# Patient Record
Sex: FEMALE | Race: WHITE | NOT HISPANIC OR LATINO | Employment: OTHER | RURAL
[De-identification: names, ages, dates, MRNs, and addresses within clinical notes are randomized per-mention and may not be internally consistent; named-entity substitution may affect disease eponyms.]

---

## 2017-05-10 ENCOUNTER — HISTORICAL (OUTPATIENT)
Dept: ADMINISTRATIVE | Facility: HOSPITAL | Age: 67
End: 2017-05-10

## 2017-05-12 LAB
LAB AP GENERAL CAT - HISTORICAL: NORMAL
LAB AP INTERPRETATION/RESULT - HISTORICAL: NEGATIVE
LAB AP SPECIMEN ADEQUACY - HISTORICAL: NORMAL
LAB AP SPECIMEN SUBMITTED - HISTORICAL: NORMAL

## 2020-07-27 ENCOUNTER — HISTORICAL (OUTPATIENT)
Dept: ADMINISTRATIVE | Facility: HOSPITAL | Age: 70
End: 2020-07-27

## 2020-08-19 ENCOUNTER — HISTORICAL (OUTPATIENT)
Dept: ADMINISTRATIVE | Facility: HOSPITAL | Age: 70
End: 2020-08-19

## 2020-08-30 ENCOUNTER — HISTORICAL (OUTPATIENT)
Dept: ADMINISTRATIVE | Facility: HOSPITAL | Age: 70
End: 2020-08-30

## 2020-10-05 ENCOUNTER — HISTORICAL (OUTPATIENT)
Dept: ADMINISTRATIVE | Facility: HOSPITAL | Age: 70
End: 2020-10-05

## 2020-10-06 LAB
INSULIN SERPL-ACNC: NORMAL U[IU]/ML
LAB AP CLINICAL INFORMATION: NORMAL
LAB AP COMMENTS: NORMAL
LAB AP DIAGNOSIS - HISTORICAL: NORMAL
LAB AP GROSS PATHOLOGY - HISTORICAL: NORMAL
LAB AP SPECIMEN SUBMITTED - HISTORICAL: NORMAL

## 2021-07-29 ENCOUNTER — TELEPHONE (OUTPATIENT)
Dept: FAMILY MEDICINE | Facility: CLINIC | Age: 71
End: 2021-07-29

## 2021-07-29 RX ORDER — VITS A,C,E/LUTEIN/MINERALS 300MCG-200
1 TABLET ORAL DAILY
COMMUNITY
End: 2022-10-05

## 2021-07-29 RX ORDER — PYRIDOXINE HCL (VITAMIN B6) 100 MG
50 TABLET ORAL DAILY
COMMUNITY

## 2021-07-29 RX ORDER — EZETIMIBE 10 MG/1
10 TABLET ORAL DAILY
COMMUNITY
End: 2021-11-08 | Stop reason: SDUPTHER

## 2021-07-29 RX ORDER — CHOLECALCIFEROL (VITAMIN D3) 25 MCG
1000 TABLET ORAL DAILY
COMMUNITY

## 2021-07-29 RX ORDER — MULTIVITAMIN
1 TABLET ORAL DAILY
COMMUNITY

## 2021-07-29 RX ORDER — LOSARTAN POTASSIUM 25 MG/1
25 TABLET ORAL DAILY
COMMUNITY
End: 2021-11-08 | Stop reason: SDUPTHER

## 2021-07-29 RX ORDER — IBUPROFEN 100 MG/5ML
1000 SUSPENSION, ORAL (FINAL DOSE FORM) ORAL DAILY
COMMUNITY
End: 2021-10-05

## 2021-07-29 RX ORDER — ESOMEPRAZOLE MAGNESIUM 20 MG/1
1 TABLET, DELAYED RELEASE ORAL DAILY
COMMUNITY
End: 2021-10-05

## 2021-07-29 RX ORDER — NAPROXEN SODIUM 220 MG/1
81 TABLET, FILM COATED ORAL DAILY
COMMUNITY

## 2021-07-29 RX ORDER — ESZOPICLONE 3 MG/1
3 TABLET, FILM COATED ORAL NIGHTLY
COMMUNITY
End: 2021-12-06

## 2021-07-29 RX ORDER — FERROUS SULFATE 325(65) MG
325 TABLET ORAL
COMMUNITY
End: 2022-10-05 | Stop reason: SDUPTHER

## 2021-07-29 RX ORDER — OMEPRAZOLE 20 MG/1
20 CAPSULE, DELAYED RELEASE ORAL DAILY
COMMUNITY
End: 2021-11-08 | Stop reason: SDUPTHER

## 2021-07-29 RX ORDER — METFORMIN HYDROCHLORIDE 500 MG/1
500 TABLET ORAL 3 TIMES DAILY
COMMUNITY
End: 2021-11-08 | Stop reason: SDUPTHER

## 2021-07-29 RX ORDER — ZINC GLUCONATE 50 MG
50 TABLET ORAL DAILY
COMMUNITY

## 2021-07-29 RX ORDER — ESZOPICLONE 2 MG/1
3 TABLET, FILM COATED ORAL NIGHTLY
COMMUNITY
End: 2021-07-29

## 2021-09-14 DIAGNOSIS — Z01.84 ENCOUNTER FOR ANTIBODY RESPONSE EXAMINATION: Primary | ICD-10-CM

## 2021-10-04 ENCOUNTER — HOSPITAL ENCOUNTER (OUTPATIENT)
Dept: RADIOLOGY | Facility: HOSPITAL | Age: 71
Discharge: HOME | End: 2021-10-04
Payer: MEDICARE

## 2021-10-04 VITALS — HEIGHT: 64 IN | BODY MASS INDEX: 25.44 KG/M2 | WEIGHT: 149 LBS

## 2021-10-04 DIAGNOSIS — Z12.31 VISIT FOR SCREENING MAMMOGRAM: ICD-10-CM

## 2021-10-04 PROCEDURE — 77067 SCR MAMMO BI INCL CAD: CPT | Mod: 26,,, | Performed by: RADIOLOGY

## 2021-10-04 PROCEDURE — 77067 SCR MAMMO BI INCL CAD: CPT | Mod: TC

## 2021-10-04 PROCEDURE — 77067 MAMMO DIGITAL SCREENING BILAT: ICD-10-PCS | Mod: 26,,, | Performed by: RADIOLOGY

## 2021-10-05 ENCOUNTER — OFFICE VISIT (OUTPATIENT)
Dept: FAMILY MEDICINE | Facility: CLINIC | Age: 71
End: 2021-10-05
Payer: MEDICARE

## 2021-10-05 VITALS
WEIGHT: 152.38 LBS | SYSTOLIC BLOOD PRESSURE: 120 MMHG | BODY MASS INDEX: 28.04 KG/M2 | HEIGHT: 62 IN | TEMPERATURE: 98 F | DIASTOLIC BLOOD PRESSURE: 76 MMHG | HEART RATE: 67 BPM | OXYGEN SATURATION: 99 % | RESPIRATION RATE: 20 BRPM

## 2021-10-05 DIAGNOSIS — Z78.0 POST-MENOPAUSAL: ICD-10-CM

## 2021-10-05 DIAGNOSIS — E78.00 HYPERCHOLESTEREMIA: Primary | ICD-10-CM

## 2021-10-05 DIAGNOSIS — G47.00 INSOMNIA, UNSPECIFIED TYPE: ICD-10-CM

## 2021-10-05 DIAGNOSIS — I10 BENIGN ESSENTIAL HTN: ICD-10-CM

## 2021-10-05 DIAGNOSIS — D50.9 IRON DEFICIENCY ANEMIA, UNSPECIFIED IRON DEFICIENCY ANEMIA TYPE: ICD-10-CM

## 2021-10-05 DIAGNOSIS — M85.89 OTHER SPECIFIED DISORDERS OF BONE DENSITY AND STRUCTURE, MULTIPLE SITES: ICD-10-CM

## 2021-10-05 DIAGNOSIS — Z79.899 ENCOUNTER FOR LONG-TERM (CURRENT) USE OF OTHER MEDICATIONS: ICD-10-CM

## 2021-10-05 DIAGNOSIS — Z23 FLU VACCINE NEED: ICD-10-CM

## 2021-10-05 DIAGNOSIS — R73.03 PREDIABETES: ICD-10-CM

## 2021-10-05 PROCEDURE — G0008 FLU VACCINE (QUAD) GREATER THAN OR EQUAL TO 3YO PRESERVATIVE FREE IM: ICD-10-PCS | Mod: ,,, | Performed by: NURSE PRACTITIONER

## 2021-10-05 PROCEDURE — 90686 FLU VACCINE (QUAD) GREATER THAN OR EQUAL TO 3YO PRESERVATIVE FREE IM: ICD-10-PCS | Mod: ,,, | Performed by: NURSE PRACTITIONER

## 2021-10-05 PROCEDURE — 90686 IIV4 VACC NO PRSV 0.5 ML IM: CPT | Mod: ,,, | Performed by: NURSE PRACTITIONER

## 2021-10-05 PROCEDURE — G0008 ADMIN INFLUENZA VIRUS VAC: HCPCS | Mod: ,,, | Performed by: NURSE PRACTITIONER

## 2021-10-05 PROCEDURE — 99214 OFFICE O/P EST MOD 30 MIN: CPT | Mod: ,,, | Performed by: NURSE PRACTITIONER

## 2021-10-05 PROCEDURE — 99214 PR OFFICE/OUTPT VISIT, EST, LEVL IV, 30-39 MIN: ICD-10-PCS | Mod: ,,, | Performed by: NURSE PRACTITIONER

## 2021-10-19 ENCOUNTER — HOSPITAL ENCOUNTER (OUTPATIENT)
Dept: RADIOLOGY | Facility: HOSPITAL | Age: 71
Discharge: HOME | End: 2021-10-19
Attending: NURSE PRACTITIONER
Payer: MEDICARE

## 2021-10-19 DIAGNOSIS — Z78.0 POST-MENOPAUSAL: ICD-10-CM

## 2021-10-19 DIAGNOSIS — M85.89 OTHER SPECIFIED DISORDERS OF BONE DENSITY AND STRUCTURE, MULTIPLE SITES: ICD-10-CM

## 2021-10-19 PROCEDURE — 77080 DXA BONE DENSITY AXIAL: CPT | Mod: 26,,, | Performed by: STUDENT IN AN ORGANIZED HEALTH CARE EDUCATION/TRAINING PROGRAM

## 2021-10-19 PROCEDURE — 77080 DEXA BONE DENSITY SPINE HIP: ICD-10-PCS | Mod: 26,,, | Performed by: STUDENT IN AN ORGANIZED HEALTH CARE EDUCATION/TRAINING PROGRAM

## 2021-10-19 PROCEDURE — 77080 DXA BONE DENSITY AXIAL: CPT | Mod: TC

## 2021-11-02 ENCOUNTER — IMMUNIZATION (OUTPATIENT)
Dept: FAMILY MEDICINE | Facility: CLINIC | Age: 71
End: 2021-11-02
Payer: MEDICARE

## 2021-11-02 DIAGNOSIS — Z23 NEED FOR VACCINATION: Primary | ICD-10-CM

## 2021-11-02 PROCEDURE — 91306 COVID-19, MRNA, LNP-S, PF, 100 MCG/0.25 ML DOSE VACCINE (MODERNA BOOSTER): CPT | Mod: ,,, | Performed by: NURSE PRACTITIONER

## 2021-11-02 PROCEDURE — 0064A COVID-19, MRNA, LNP-S, PF, 100 MCG/0.25 ML DOSE VACCINE (MODERNA BOOSTER): CPT | Mod: ,,, | Performed by: NURSE PRACTITIONER

## 2021-11-02 PROCEDURE — 91306 COVID-19, MRNA, LNP-S, PF, 100 MCG/0.25 ML DOSE VACCINE (MODERNA BOOSTER): ICD-10-PCS | Mod: ,,, | Performed by: NURSE PRACTITIONER

## 2021-11-02 PROCEDURE — 0064A COVID-19, MRNA, LNP-S, PF, 100 MCG/0.25 ML DOSE VACCINE (MODERNA BOOSTER): ICD-10-PCS | Mod: ,,, | Performed by: NURSE PRACTITIONER

## 2021-11-08 RX ORDER — LOSARTAN POTASSIUM 25 MG/1
25 TABLET ORAL DAILY
Qty: 90 TABLET | Refills: 1 | Status: SHIPPED | OUTPATIENT
Start: 2021-11-08 | End: 2021-12-06 | Stop reason: SDUPTHER

## 2021-11-08 RX ORDER — OMEPRAZOLE 20 MG/1
20 CAPSULE, DELAYED RELEASE ORAL DAILY
Qty: 90 CAPSULE | Refills: 1 | Status: SHIPPED | OUTPATIENT
Start: 2021-11-08 | End: 2021-12-06 | Stop reason: SDUPTHER

## 2021-11-08 RX ORDER — EZETIMIBE 10 MG/1
10 TABLET ORAL DAILY
Qty: 90 TABLET | Refills: 1 | Status: SHIPPED | OUTPATIENT
Start: 2021-11-08 | End: 2021-12-06 | Stop reason: SDUPTHER

## 2021-11-08 RX ORDER — METFORMIN HYDROCHLORIDE 500 MG/1
500 TABLET ORAL 3 TIMES DAILY
Qty: 270 TABLET | Refills: 1 | Status: SHIPPED | OUTPATIENT
Start: 2021-11-08 | End: 2021-12-06 | Stop reason: SDUPTHER

## 2021-11-19 DIAGNOSIS — Z98.1 HISTORY OF LUMBAR FUSION: Primary | ICD-10-CM

## 2021-11-22 ENCOUNTER — HOSPITAL ENCOUNTER (OUTPATIENT)
Dept: RADIOLOGY | Facility: HOSPITAL | Age: 71
Discharge: HOME | End: 2021-11-22
Attending: ORTHOPAEDIC SURGERY
Payer: MEDICARE

## 2021-11-22 ENCOUNTER — OFFICE VISIT (OUTPATIENT)
Dept: SPINE | Facility: CLINIC | Age: 71
End: 2021-11-22
Payer: MEDICARE

## 2021-11-22 DIAGNOSIS — M51.36 DDD (DEGENERATIVE DISC DISEASE), LUMBAR: ICD-10-CM

## 2021-11-22 DIAGNOSIS — Z98.1 HISTORY OF LUMBAR FUSION: ICD-10-CM

## 2021-11-22 DIAGNOSIS — Z98.1 HISTORY OF LUMBAR FUSION: Primary | ICD-10-CM

## 2021-11-22 PROCEDURE — 99213 OFFICE O/P EST LOW 20 MIN: CPT | Mod: PBBFAC | Performed by: ORTHOPAEDIC SURGERY

## 2021-11-22 PROCEDURE — 99214 OFFICE O/P EST MOD 30 MIN: CPT | Mod: S$PBB,,, | Performed by: ORTHOPAEDIC SURGERY

## 2021-11-22 PROCEDURE — 99214 PR OFFICE/OUTPT VISIT, EST, LEVL IV, 30-39 MIN: ICD-10-PCS | Mod: S$PBB,,, | Performed by: ORTHOPAEDIC SURGERY

## 2021-11-22 PROCEDURE — 72100 XR LUMBAR SPINE AP AND LATERAL: ICD-10-PCS | Mod: 26,,, | Performed by: ORTHOPAEDIC SURGERY

## 2021-11-22 PROCEDURE — 72100 X-RAY EXAM L-S SPINE 2/3 VWS: CPT | Mod: TC

## 2021-11-22 PROCEDURE — 72100 X-RAY EXAM L-S SPINE 2/3 VWS: CPT | Mod: 26,,, | Performed by: ORTHOPAEDIC SURGERY

## 2021-12-06 ENCOUNTER — HOSPITAL ENCOUNTER (OUTPATIENT)
Dept: RADIOLOGY | Facility: HOSPITAL | Age: 71
Discharge: HOME | End: 2021-12-06
Attending: NURSE PRACTITIONER
Payer: MEDICARE

## 2021-12-06 ENCOUNTER — OFFICE VISIT (OUTPATIENT)
Dept: FAMILY MEDICINE | Facility: CLINIC | Age: 71
End: 2021-12-06
Payer: MEDICARE

## 2021-12-06 VITALS
BODY MASS INDEX: 28.34 KG/M2 | WEIGHT: 154 LBS | SYSTOLIC BLOOD PRESSURE: 137 MMHG | HEART RATE: 72 BPM | HEIGHT: 62 IN | OXYGEN SATURATION: 100 % | DIASTOLIC BLOOD PRESSURE: 68 MMHG | TEMPERATURE: 97 F

## 2021-12-06 DIAGNOSIS — K21.9 GASTROESOPHAGEAL REFLUX DISEASE, UNSPECIFIED WHETHER ESOPHAGITIS PRESENT: ICD-10-CM

## 2021-12-06 DIAGNOSIS — M25.561 RIGHT KNEE PAIN, UNSPECIFIED CHRONICITY: ICD-10-CM

## 2021-12-06 DIAGNOSIS — E78.5 HYPERLIPIDEMIA, UNSPECIFIED HYPERLIPIDEMIA TYPE: ICD-10-CM

## 2021-12-06 DIAGNOSIS — I10 ESSENTIAL HYPERTENSION: ICD-10-CM

## 2021-12-06 DIAGNOSIS — R73.03 PREDIABETES: ICD-10-CM

## 2021-12-06 DIAGNOSIS — M25.561 RIGHT KNEE PAIN, UNSPECIFIED CHRONICITY: Primary | ICD-10-CM

## 2021-12-06 PROCEDURE — 99213 PR OFFICE/OUTPT VISIT, EST, LEVL III, 20-29 MIN: ICD-10-PCS | Mod: ,,, | Performed by: NURSE PRACTITIONER

## 2021-12-06 PROCEDURE — 73560 X-RAY EXAM OF KNEE 1 OR 2: CPT | Mod: TC,RT

## 2021-12-06 PROCEDURE — 99213 OFFICE O/P EST LOW 20 MIN: CPT | Mod: ,,, | Performed by: NURSE PRACTITIONER

## 2021-12-06 RX ORDER — METFORMIN HYDROCHLORIDE 500 MG/1
500 TABLET ORAL 3 TIMES DAILY
Qty: 270 TABLET | Refills: 0 | Status: SHIPPED | OUTPATIENT
Start: 2021-12-06 | End: 2022-04-05 | Stop reason: SDUPTHER

## 2021-12-06 RX ORDER — EZETIMIBE 10 MG/1
10 TABLET ORAL DAILY
Qty: 90 TABLET | Refills: 0 | Status: SHIPPED | OUTPATIENT
Start: 2021-12-06 | End: 2022-04-05 | Stop reason: SDUPTHER

## 2021-12-06 RX ORDER — LOSARTAN POTASSIUM 25 MG/1
25 TABLET ORAL DAILY
Qty: 90 TABLET | Refills: 0 | Status: SHIPPED | OUTPATIENT
Start: 2021-12-06 | End: 2022-04-05 | Stop reason: SDUPTHER

## 2021-12-06 RX ORDER — OMEPRAZOLE 20 MG/1
20 CAPSULE, DELAYED RELEASE ORAL DAILY
Qty: 90 CAPSULE | Refills: 0 | Status: SHIPPED | OUTPATIENT
Start: 2021-12-06 | End: 2022-04-05 | Stop reason: SDUPTHER

## 2021-12-09 ENCOUNTER — HOSPITAL ENCOUNTER (OUTPATIENT)
Dept: RADIOLOGY | Facility: HOSPITAL | Age: 71
Discharge: HOME | End: 2021-12-09
Attending: NURSE PRACTITIONER
Payer: MEDICARE

## 2021-12-09 DIAGNOSIS — M25.561 RIGHT KNEE PAIN, UNSPECIFIED CHRONICITY: ICD-10-CM

## 2021-12-09 PROCEDURE — 73721 MRI JNT OF LWR EXTRE W/O DYE: CPT | Mod: TC,RT

## 2021-12-15 ENCOUNTER — TELEPHONE (OUTPATIENT)
Dept: FAMILY MEDICINE | Facility: CLINIC | Age: 71
End: 2021-12-15
Payer: MEDICARE

## 2021-12-15 DIAGNOSIS — M25.561 RIGHT KNEE PAIN, UNSPECIFIED CHRONICITY: Primary | ICD-10-CM

## 2022-03-11 DIAGNOSIS — Z71.89 COMPLEX CARE COORDINATION: ICD-10-CM

## 2022-04-05 ENCOUNTER — OFFICE VISIT (OUTPATIENT)
Dept: FAMILY MEDICINE | Facility: CLINIC | Age: 72
End: 2022-04-05
Payer: MEDICARE

## 2022-04-05 VITALS
SYSTOLIC BLOOD PRESSURE: 152 MMHG | OXYGEN SATURATION: 99 % | TEMPERATURE: 97 F | HEIGHT: 62 IN | DIASTOLIC BLOOD PRESSURE: 70 MMHG | WEIGHT: 156 LBS | BODY MASS INDEX: 28.71 KG/M2 | HEART RATE: 71 BPM

## 2022-04-05 DIAGNOSIS — R73.03 PREDIABETES: ICD-10-CM

## 2022-04-05 DIAGNOSIS — D50.9 IRON DEFICIENCY ANEMIA, UNSPECIFIED IRON DEFICIENCY ANEMIA TYPE: ICD-10-CM

## 2022-04-05 DIAGNOSIS — I10 BENIGN ESSENTIAL HTN: Primary | ICD-10-CM

## 2022-04-05 DIAGNOSIS — E78.00 HYPERCHOLESTEREMIA: ICD-10-CM

## 2022-04-05 DIAGNOSIS — G47.00 INSOMNIA, UNSPECIFIED TYPE: ICD-10-CM

## 2022-04-05 DIAGNOSIS — M25.561 RIGHT KNEE PAIN, UNSPECIFIED CHRONICITY: ICD-10-CM

## 2022-04-05 DIAGNOSIS — K21.9 GASTROESOPHAGEAL REFLUX DISEASE, UNSPECIFIED WHETHER ESOPHAGITIS PRESENT: ICD-10-CM

## 2022-04-05 PROCEDURE — 99213 PR OFFICE/OUTPT VISIT, EST, LEVL III, 20-29 MIN: ICD-10-PCS | Mod: ,,, | Performed by: NURSE PRACTITIONER

## 2022-04-05 PROCEDURE — 82570 MICROALBUMIN / CREATININE RATIO URINE: ICD-10-PCS | Mod: ,,, | Performed by: CLINICAL MEDICAL LABORATORY

## 2022-04-05 PROCEDURE — 83036 HEMOGLOBIN GLYCOSYLATED A1C: CPT | Mod: ,,, | Performed by: CLINICAL MEDICAL LABORATORY

## 2022-04-05 PROCEDURE — 82043 MICROALBUMIN / CREATININE RATIO URINE: ICD-10-PCS | Mod: ,,, | Performed by: CLINICAL MEDICAL LABORATORY

## 2022-04-05 PROCEDURE — 83036 HEMOGLOBIN A1C: ICD-10-PCS | Mod: ,,, | Performed by: CLINICAL MEDICAL LABORATORY

## 2022-04-05 PROCEDURE — 99213 OFFICE O/P EST LOW 20 MIN: CPT | Mod: ,,, | Performed by: NURSE PRACTITIONER

## 2022-04-05 PROCEDURE — 82043 UR ALBUMIN QUANTITATIVE: CPT | Mod: ,,, | Performed by: CLINICAL MEDICAL LABORATORY

## 2022-04-05 PROCEDURE — 82570 ASSAY OF URINE CREATININE: CPT | Mod: ,,, | Performed by: CLINICAL MEDICAL LABORATORY

## 2022-04-05 RX ORDER — METFORMIN HYDROCHLORIDE 500 MG/1
500 TABLET ORAL 3 TIMES DAILY
Qty: 270 TABLET | Refills: 1 | Status: SHIPPED | OUTPATIENT
Start: 2022-04-05 | End: 2022-10-05 | Stop reason: SDUPTHER

## 2022-04-05 RX ORDER — MELOXICAM 15 MG/1
15 TABLET ORAL DAILY
Qty: 90 TABLET | Refills: 1 | Status: SHIPPED | OUTPATIENT
Start: 2022-04-05 | End: 2022-05-25

## 2022-04-05 RX ORDER — MELOXICAM 15 MG/1
15 TABLET ORAL DAILY
COMMUNITY
End: 2022-04-05 | Stop reason: SDUPTHER

## 2022-04-05 RX ORDER — OMEPRAZOLE 20 MG/1
20 CAPSULE, DELAYED RELEASE ORAL DAILY
Qty: 90 CAPSULE | Refills: 1 | Status: SHIPPED | OUTPATIENT
Start: 2022-04-05 | End: 2022-10-05 | Stop reason: SDUPTHER

## 2022-04-05 RX ORDER — EZETIMIBE 10 MG/1
10 TABLET ORAL DAILY
Qty: 90 TABLET | Refills: 1 | Status: SHIPPED | OUTPATIENT
Start: 2022-04-05 | End: 2022-10-05 | Stop reason: SDUPTHER

## 2022-04-05 RX ORDER — LOSARTAN POTASSIUM 25 MG/1
25 TABLET ORAL DAILY
Qty: 90 TABLET | Refills: 1 | Status: SHIPPED | OUTPATIENT
Start: 2022-04-05 | End: 2022-10-05 | Stop reason: SDUPTHER

## 2022-04-05 NOTE — PATIENT INSTRUCTIONS
Lab obtained in clinic today, we will notify you of results and any necessary changes to plan of care     Refills on routine medications   Advised to monitor BP at home. Advised on optimal BP readings - SBP < 130 & DBP < 80. Advised to call office for any persistent BP elevation and may have to prescribe or adjust BP med(s).  Recommended DASH diet, stay well hydrated with water daily, eliminate or decrease caffeinated and high calorie drinks, increase physical activity, and lose weight if BMI > 25.0.

## 2022-04-06 LAB
CREAT UR-MCNC: 93 MG/DL (ref 28–219)
EST. AVERAGE GLUCOSE BLD GHB EST-MCNC: 110 MG/DL
HBA1C MFR BLD HPLC: 5.9 % (ref 4.5–6.6)
MICROALBUMIN UR-MCNC: 0.7 MG/DL (ref 0–2.8)
MICROALBUMIN/CREAT RATIO PNL UR: 7.5 MG/G (ref 0–30)

## 2022-04-18 NOTE — PROGRESS NOTES
Clinic note     Patient name: Lynda Parr is a 71 y.o. female   Chief compliant   Chief Complaint   Patient presents with    Follow-up     No problems with anything.  Had a eposide yesterday where she thinks may have been her b/p, with some dizziness.        Subjective     History of present illness   In clinic for routine follow up, lab and medication refills   Denies any acute concerns at present   Reports she had an episode of dizziness yesterday which she states could be r/t her blood pressure; denies any chest pain, shortness of breath, palpitations  She admits that her diet has changed with increased salt intake; she states she has also had more sun exposure recently  and could be a little dehydrated  She remains physically active working in her garden  Mammogram: due November 2022  Colonoscopy: due 2023   PMH: hypertension, hyperlipidemia, prediabetes, insomnia   Last A1c was 5.5, this will be rechecked today       Social History     Tobacco Use    Smoking status: Never Smoker    Smokeless tobacco: Never Used   Substance Use Topics    Alcohol use: Yes    Drug use: Never       Review of patient's allergies indicates:   Allergen Reactions    Ace inhibitors Other (See Comments)       Past Medical History:   Diagnosis Date    Benign essential HTN     Carpal tunnel syndrome, bilateral upper limbs     Chronic low back pain     Degeneration, intervertebral disc, lumbosacral     GERD (gastroesophageal reflux disease)     Hypercholesterolemia     Insomnia     Iron deficiency anemia     Localized swelling, mass and lump, right lower limb     Lower extremity edema     Lumbar spondylosis     Prediabetes     Restless leg     Slow transit constipation     Spinal stenosis, lumbar     Vitamin D deficiency        Past Surgical History:   Procedure Laterality Date    CATARACT EXTRACTION Left 09/10/2020    CATARACT EXTRACTION Right 10/01/2020    Dr. Stephen Fox    CHOLECYSTECTOMY  1990    COSMETIC  SURGERY      eyelid    HIP ARTHROPLASTY Left 10/2015    repair of torn gluteus medius muscle via hip [facility]rosc[facility], Dr. Kent    SHOULDER ARTHROSCOPY W/ ROTATOR CUFF REPAIR  2005    SINUS SURGERY  02/2011    TUBAL LIGATION      VASCULAR SURGERY  12/15/2017    R [facility] Laser Ablation per Dr. Herman Bullard        Family History   Problem Relation Age of Onset    Hypertension Mother     Coronary artery disease Father     Pulmonary embolism Brother     No Known Problems Son          Current Outpatient Medications:     aspirin 81 MG Chew, Take 81 mg by mouth once daily., Disp: , Rfl:     ferrous sulfate (FEOSOL) 325 mg (65 mg iron) Tab tablet, Take 325 mg by mouth daily with breakfast., Disp: , Rfl:     magnesium oxide 200 mg magnesium Tab, Take 1 tablet by mouth once daily., Disp: , Rfl:     multivitamin (THERAGRAN) per tablet, Take 1 tablet by mouth once daily., Disp: , Rfl:     pyridoxine, vitamin B6, (B-6) 100 MG Tab, Take 50 mg by mouth once daily., Disp: , Rfl:     vitamin D (VITAMIN D3) 1000 units Tab, Take 1,000 Units by mouth once daily., Disp: , Rfl:     zinc gluconate 50 mg tablet, Take 50 mg by mouth once daily., Disp: , Rfl:     ezetimibe (ZETIA) 10 mg tablet, Take 1 tablet (10 mg total) by mouth once daily., Disp: 90 tablet, Rfl: 1    losartan (COZAAR) 25 MG tablet, Take 1 tablet (25 mg total) by mouth once daily., Disp: 90 tablet, Rfl: 1    meloxicam (MOBIC) 15 MG tablet, Take 1 tablet (15 mg total) by mouth once daily., Disp: 90 tablet, Rfl: 1    metFORMIN (GLUCOPHAGE) 500 MG tablet, Take 1 tablet (500 mg total) by mouth 3 (three) times daily., Disp: 270 tablet, Rfl: 1    omeprazole (PRILOSEC) 20 MG capsule, Take 1 capsule (20 mg total) by mouth once daily., Disp: 90 capsule, Rfl: 1    Review of Systems   Constitutional: Negative for appetite change, chills, fatigue, fever and unexpected weight change.   Eyes: Negative for visual disturbance.   Respiratory:  "Negative for cough and shortness of breath.    Cardiovascular: Negative for chest pain, palpitations and leg swelling.   Gastrointestinal: Negative for abdominal pain, blood in stool, change in bowel habit, constipation, diarrhea, nausea, vomiting and change in bowel habit.   Endocrine: Negative for polydipsia and polyuria.   Genitourinary: Negative for dysuria and frequency.   Musculoskeletal: Negative for arthralgias, gait problem and myalgias.   Integumentary:  Negative for wound.   Neurological: Positive for dizziness. Negative for syncope, light-headedness and headaches.        One episode of dizziness yesterday    Psychiatric/Behavioral: Negative for confusion, dysphoric mood and sleep disturbance. The patient is not nervous/anxious.        Objective     BP (!) 152/70   Pulse 71   Temp 97.4 °F (36.3 °C)   Ht 5' 2" (1.575 m)   Wt 70.8 kg (156 lb)   SpO2 99%   BMI 28.53 kg/m²     Physical Exam   Constitutional: She is oriented to person, place, and time. No distress.   HENT:   Head: Normocephalic and atraumatic.   Mouth/Throat: Mucous membranes are moist.   Eyes: Pupils are equal, round, and reactive to light. Conjunctivae are normal.   Cardiovascular: Normal rate and regular rhythm.   Pulmonary/Chest: Effort normal and breath sounds normal. No respiratory distress. She has no wheezes. She has no rhonchi. She has no rales.   Abdominal: Soft. Bowel sounds are normal. She exhibits no distension. There is no abdominal tenderness.   Musculoskeletal:         General: Normal range of motion.      Cervical back: Normal range of motion and neck supple.      Right lower leg: No edema.      Left lower leg: No edema.   Neurological: She is alert and oriented to person, place, and time. Gait normal.   Skin: Skin is warm and dry.   Psychiatric: Her behavior is normal. Mood normal.       Lab Results   Component Value Date    WBC 7.14 10/04/2021    HGB 11.4 (L) 10/04/2021    HCT 36.1 (L) 10/04/2021    MCV 97.3 (H) " 10/04/2021     10/04/2021       CMP  Sodium   Date Value Ref Range Status   10/04/2021 145 136 - 145 mmol/L Final     Potassium   Date Value Ref Range Status   10/04/2021 5.0 3.5 - 5.1 mmol/L Final     Chloride   Date Value Ref Range Status   10/04/2021 111 (H) 98 - 107 mmol/L Final     CO2   Date Value Ref Range Status   10/04/2021 27 21 - 32 mmol/L Final     Glucose   Date Value Ref Range Status   10/04/2021 105 74 - 106 mg/dL Final     BUN   Date Value Ref Range Status   10/04/2021 22 (H) 7 - 18 mg/dL Final     Creatinine   Date Value Ref Range Status   10/04/2021 0.79 0.55 - 1.02 mg/dL Final     Calcium   Date Value Ref Range Status   10/04/2021 9.3 8.5 - 10.1 mg/dL Final     Total Protein   Date Value Ref Range Status   10/04/2021 7.4 6.4 - 8.2 g/dL Final     Albumin   Date Value Ref Range Status   10/04/2021 3.8 3.5 - 5.0 g/dL Final     Bilirubin, Total   Date Value Ref Range Status   10/04/2021 0.4 >0.0 - 1.2 mg/dL Final     Alk Phos   Date Value Ref Range Status   10/04/2021 64 55 - 142 U/L Final     AST   Date Value Ref Range Status   10/04/2021 23 15 - 37 U/L Final     ALT   Date Value Ref Range Status   10/04/2021 32 13 - 56 U/L Final     Anion Gap   Date Value Ref Range Status   10/04/2021 12 7 - 16 mmol/L Final     eGFR   Date Value Ref Range Status   10/04/2021 77 >=60 mL/min/1.73m² Final     Lab Results   Component Value Date    TSH 2.210 10/04/2021     Lab Results   Component Value Date    CHOL 185 10/04/2021     Lab Results   Component Value Date    HDL 71 (H) 10/04/2021     Lab Results   Component Value Date    LDLCALC 101 10/04/2021     Lab Results   Component Value Date    TRIG 65 10/04/2021     Lab Results   Component Value Date    CHOLHDL 2.6 10/04/2021     Lab Results   Component Value Date    HGBA1C 5.9 04/05/2022         Assessment and Plan   Benign essential HTN  -     losartan (COZAAR) 25 MG tablet; Take 1 tablet (25 mg total) by mouth once daily.  Dispense: 90 tablet; Refill:  1    Hypercholesteremia  -     ezetimibe (ZETIA) 10 mg tablet; Take 1 tablet (10 mg total) by mouth once daily.  Dispense: 90 tablet; Refill: 1    Iron deficiency anemia, unspecified iron deficiency anemia type    Prediabetes  -     Hemoglobin A1C; Future; Expected date: 04/05/2022  -     Microalbumin/Creatinine Ratio, Urine; Future; Expected date: 04/05/2022  -     metFORMIN (GLUCOPHAGE) 500 MG tablet; Take 1 tablet (500 mg total) by mouth 3 (three) times daily.  Dispense: 270 tablet; Refill: 1    Gastroesophageal reflux disease, unspecified whether esophagitis present  -     omeprazole (PRILOSEC) 20 MG capsule; Take 1 capsule (20 mg total) by mouth once daily.  Dispense: 90 capsule; Refill: 1    Insomnia, unspecified type    Right knee pain, unspecified chronicity  -     meloxicam (MOBIC) 15 MG tablet; Take 1 tablet (15 mg total) by mouth once daily.  Dispense: 90 tablet; Refill: 1          Patient Instructions  Patient Instructions   Lab obtained in clinic today, we will notify you of results and any necessary changes to plan of care     Refills on routine medications   Advised to monitor BP at home. Advised on optimal BP readings - SBP < 130 & DBP < 80. Advised to call office for any persistent BP elevation and may have to prescribe or adjust BP med(s).  Recommended DASH diet, stay well hydrated with water daily, eliminate or decrease caffeinated and high calorie drinks, increase physical activity, and lose weight if BMI > 25.0.

## 2022-05-18 ENCOUNTER — OFFICE VISIT (OUTPATIENT)
Dept: FAMILY MEDICINE | Facility: CLINIC | Age: 72
End: 2022-05-18
Payer: MEDICARE

## 2022-05-18 VITALS
DIASTOLIC BLOOD PRESSURE: 70 MMHG | OXYGEN SATURATION: 99 % | WEIGHT: 150 LBS | HEART RATE: 75 BPM | HEIGHT: 62 IN | TEMPERATURE: 98 F | SYSTOLIC BLOOD PRESSURE: 141 MMHG | BODY MASS INDEX: 27.6 KG/M2

## 2022-05-18 DIAGNOSIS — E55.9 VITAMIN D DEFICIENCY, UNSPECIFIED: ICD-10-CM

## 2022-05-18 DIAGNOSIS — R21 SKIN RASH: Primary | ICD-10-CM

## 2022-05-18 DIAGNOSIS — I10 BENIGN ESSENTIAL HTN: ICD-10-CM

## 2022-05-18 DIAGNOSIS — R20.8 DECREASED SENSATION OF FOOT: ICD-10-CM

## 2022-05-18 DIAGNOSIS — R73.03 PREDIABETES: ICD-10-CM

## 2022-05-18 DIAGNOSIS — Z98.1 HISTORY OF LUMBAR SPINAL FUSION: ICD-10-CM
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
## 2022-05-18 DIAGNOSIS — D50.9 IRON DEFICIENCY ANEMIA, UNSPECIFIED IRON DEFICIENCY ANEMIA TYPE: ICD-10-CM

## 2022-05-18 DIAGNOSIS — R25.2 LEG CRAMPS: ICD-10-CM
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
## 2022-05-18 DIAGNOSIS — M54.9 DORSALGIA, UNSPECIFIED: ICD-10-CM

## 2022-05-18 PROCEDURE — 83735 MAGNESIUM: ICD-10-PCS | Mod: ,,, | Performed by: CLINICAL MEDICAL LABORATORY

## 2022-05-18 PROCEDURE — 80053 COMPREHENSIVE METABOLIC PANEL: ICD-10-PCS | Mod: ,,, | Performed by: CLINICAL MEDICAL LABORATORY

## 2022-05-18 PROCEDURE — 99214 PR OFFICE/OUTPT VISIT, EST, LEVL IV, 30-39 MIN: ICD-10-PCS | Mod: ,,, | Performed by: NURSE PRACTITIONER

## 2022-05-18 PROCEDURE — 82306 VITAMIN D 25 HYDROXY: CPT | Mod: ,,, | Performed by: CLINICAL MEDICAL LABORATORY

## 2022-05-18 PROCEDURE — 85025 COMPLETE CBC W/AUTO DIFF WBC: CPT | Mod: ,,, | Performed by: CLINICAL MEDICAL LABORATORY

## 2022-05-18 PROCEDURE — 82607 VITAMIN B12: ICD-10-PCS | Mod: ,,, | Performed by: CLINICAL MEDICAL LABORATORY

## 2022-05-18 PROCEDURE — 80053 COMPREHEN METABOLIC PANEL: CPT | Mod: ,,, | Performed by: CLINICAL MEDICAL LABORATORY

## 2022-05-18 PROCEDURE — 82607 VITAMIN B-12: CPT | Mod: ,,, | Performed by: CLINICAL MEDICAL LABORATORY

## 2022-05-18 PROCEDURE — 85025 CBC WITH DIFFERENTIAL: ICD-10-PCS | Mod: ,,, | Performed by: CLINICAL MEDICAL LABORATORY

## 2022-05-18 PROCEDURE — 99214 OFFICE O/P EST MOD 30 MIN: CPT | Mod: ,,, | Performed by: NURSE PRACTITIONER

## 2022-05-18 PROCEDURE — 83735 ASSAY OF MAGNESIUM: CPT | Mod: ,,, | Performed by: CLINICAL MEDICAL LABORATORY

## 2022-05-18 PROCEDURE — 82306 VITAMIN D: ICD-10-PCS | Mod: ,,, | Performed by: CLINICAL MEDICAL LABORATORY

## 2022-05-18 RX ORDER — MUPIROCIN 20 MG/G
OINTMENT TOPICAL 2 TIMES DAILY
Qty: 30 G | Refills: 0 | Status: SHIPPED | OUTPATIENT
Start: 2022-05-18 | End: 2022-05-25

## 2022-05-18 NOTE — PATIENT INSTRUCTIONS
Lab obtained in clinic today, we will notify you of results and any necessary changes to plan of care   MRI lumbar spine without contrast ordered will notify of results when available  EMG/NCV ordered, this will be at neurology, Dr Valle, results will be forwarded here and we will notify you of results when they are available    Bactroban to skin rash on left calf x 7 days

## 2022-05-19 ENCOUNTER — HOSPITAL ENCOUNTER (OUTPATIENT)
Dept: RADIOLOGY | Facility: HOSPITAL | Age: 72
Discharge: HOME | End: 2022-05-19
Attending: NURSE PRACTITIONER
Payer: MEDICARE

## 2022-05-19 DIAGNOSIS — M54.9 DORSALGIA, UNSPECIFIED: ICD-10-CM

## 2022-05-19 LAB
25(OH)D3 SERPL-MCNC: 69.5 NG/ML
ALBUMIN SERPL BCP-MCNC: 4 G/DL (ref 3.5–5)
ALBUMIN/GLOB SERPL: 1.1 {RATIO}
ALP SERPL-CCNC: 61 U/L (ref 55–142)
ALT SERPL W P-5'-P-CCNC: 33 U/L (ref 13–56)
ANION GAP SERPL CALCULATED.3IONS-SCNC: 12 MMOL/L (ref 7–16)
AST SERPL W P-5'-P-CCNC: 24 U/L (ref 15–37)
BASOPHILS # BLD AUTO: 0.04 K/UL (ref 0–0.2)
BASOPHILS NFR BLD AUTO: 0.4 % (ref 0–1)
BILIRUB SERPL-MCNC: 0.5 MG/DL (ref 0–1.2)
BUN SERPL-MCNC: 26 MG/DL (ref 7–18)
BUN/CREAT SERPL: 32 (ref 6–20)
CALCIUM SERPL-MCNC: 10.1 MG/DL (ref 8.5–10.1)
CHLORIDE SERPL-SCNC: 105 MMOL/L (ref 98–107)
CO2 SERPL-SCNC: 26 MMOL/L (ref 21–32)
CREAT SERPL-MCNC: 0.81 MG/DL (ref 0.55–1.02)
DIFFERENTIAL METHOD BLD: ABNORMAL
EOSINOPHIL # BLD AUTO: 0.15 K/UL (ref 0–0.5)
EOSINOPHIL NFR BLD AUTO: 1.7 % (ref 1–4)
ERYTHROCYTE [DISTWIDTH] IN BLOOD BY AUTOMATED COUNT: 13.8 % (ref 11.5–14.5)
GLOBULIN SER-MCNC: 3.5 G/DL (ref 2–4)
GLUCOSE SERPL-MCNC: 93 MG/DL (ref 74–106)
HCT VFR BLD AUTO: 33.5 % (ref 38–47)
HGB BLD-MCNC: 11 G/DL (ref 12–16)
IMM GRANULOCYTES # BLD AUTO: 0.02 K/UL (ref 0–0.04)
IMM GRANULOCYTES NFR BLD: 0.2 % (ref 0–0.4)
LYMPHOCYTES # BLD AUTO: 2.96 K/UL (ref 1–4.8)
LYMPHOCYTES NFR BLD AUTO: 33.3 % (ref 27–41)
MAGNESIUM SERPL-MCNC: 2.2 MG/DL (ref 1.7–2.3)
MCH RBC QN AUTO: 31.1 PG (ref 27–31)
MCHC RBC AUTO-ENTMCNC: 32.8 G/DL (ref 32–36)
MCV RBC AUTO: 94.6 FL (ref 80–96)
MONOCYTES # BLD AUTO: 0.84 K/UL (ref 0–0.8)
MONOCYTES NFR BLD AUTO: 9.4 % (ref 2–6)
MPC BLD CALC-MCNC: 11.1 FL (ref 9.4–12.4)
NEUTROPHILS # BLD AUTO: 4.89 K/UL (ref 1.8–7.7)
NEUTROPHILS NFR BLD AUTO: 55 % (ref 53–65)
NRBC # BLD AUTO: 0 X10E3/UL
NRBC, AUTO (.00): 0 %
PLATELET # BLD AUTO: 278 K/UL (ref 150–400)
POTASSIUM SERPL-SCNC: 4.2 MMOL/L (ref 3.5–5.1)
PROT SERPL-MCNC: 7.5 G/DL (ref 6.4–8.2)
RBC # BLD AUTO: 3.54 M/UL (ref 4.2–5.4)
SODIUM SERPL-SCNC: 139 MMOL/L (ref 136–145)
VIT B12 SERPL-MCNC: 563 PG/ML (ref 193–986)
WBC # BLD AUTO: 8.9 K/UL (ref 4.5–11)

## 2022-05-19 PROCEDURE — 72148 MRI LUMBAR SPINE W/O DYE: CPT | Mod: TC

## 2022-05-19 NOTE — PROGRESS NOTES
"Clinic note     Patient name: Lynda Parr is a 71 y.o. female   Chief compliant   Chief Complaint   Patient presents with    Leg Pain     Legs cramps, with decreased sensation up to the ankles happening mostly at night.   Also states it happens in her hands a few times a month.     Back Pain    Rash     Left lower leg, was a bug bite. Does not known what bit her, started has one bite.        Subjective     History of present illness   In clinic for evaluation of foot and leg cramps along with decreased sensation of bilateral feet which has worsened over the last few months   Reports a fall at home on stairs April 29, she had bruising to left lower back following fall, was not seen by provider for evaluation at time of fall, she denies any increased lower back or radicular pain at present  Hx of L4-5 lumbar fusion with interbody October 2019  Xray of lumbar spine 11/22/2022 per Dr Clark's clinic note "On the AP there is normal coronal alignment.  There are 5 non-rib-bearing lumbar vertebrae.  On the lateral there is maintained lumbar lordosis.  She is status post L4-5 laminectomy and fusion with interbody.  No signs of hardware loosening or failure.  Impression:  Spondylotic changes of the lumbar spine as noted above"    She reports cramping is in bilateral lower legs and feet, worse at night or with plantar flexion of foot   She states it the bottom of her feet feel like "she has flip flops on" all the time. She reports decreased sensation of bilateral feet which has now progressed up to just above ankles  Past Medical History: hypertension, chronic lower back pain, lumbosacral ddd, GERD, HLD, ID anemia, prediabetes, lumbar spinal stenosis, HTN  She states she has had previous saphenous vein ablation by Dr Benítez, followed in past by Dr Bullard        Social History     Tobacco Use    Smoking status: Never Smoker    Smokeless tobacco: Never Used   Substance Use Topics    Alcohol use: Yes    Drug use: Never "       Review of patient's allergies indicates:   Allergen Reactions    Ace inhibitors Other (See Comments)       Past Medical History:   Diagnosis Date    Benign essential HTN     Carpal tunnel syndrome, bilateral upper limbs     Chronic low back pain     Degeneration, intervertebral disc, lumbosacral     GERD (gastroesophageal reflux disease)     Hypercholesterolemia     Insomnia     Iron deficiency anemia     Localized swelling, mass and lump, right lower limb     Lower extremity edema     Lumbar spondylosis     Prediabetes     Restless leg     Slow transit constipation     Spinal stenosis, lumbar     Vitamin D deficiency        Past Surgical History:   Procedure Laterality Date    CATARACT EXTRACTION Left 09/10/2020    CATARACT EXTRACTION Right 10/01/2020    Dr. Stephen Fox    CHOLECYSTECTOMY  1990    COSMETIC SURGERY      eyelid    HIP ARTHROPLASTY Left 10/2015    repair of torn gluteus medius muscle [facility], Dr. Kent    SHOULDER ARTHROSCOPY W/ ROTATOR CUFF REPAIR  2005    SINUS SURGERY  02/2011    TUBAL LIGATION      VASCULAR SURGERY  12/15/2017    R [facility] Laser Ablation per Dr. Herman Bullard        Family History   Problem Relation Age of Onset    Hypertension Mother     Coronary artery disease Father     Pulmonary embolism Brother     No Known Problems Son          Current Outpatient Medications:     aspirin 81 MG Chew, Take 81 mg by mouth once daily., Disp: , Rfl:     ezetimibe (ZETIA) 10 mg tablet, Take 1 tablet (10 mg total) by mouth once daily., Disp: 90 tablet, Rfl: 1    ferrous sulfate (FEOSOL) 325 mg (65 mg iron) Tab tablet, Take 325 mg by mouth daily with breakfast., Disp: , Rfl:     losartan (COZAAR) 25 MG tablet, Take 1 tablet (25 mg total) by mouth once daily., Disp: 90 tablet, Rfl: 1    magnesium oxide 200 mg magnesium Tab, Take 1 tablet by mouth once daily., Disp: , Rfl:     metFORMIN (GLUCOPHAGE) 500 MG tablet, Take 1  "tablet (500 mg total) by mouth 3 (three) times daily., Disp: 270 tablet, Rfl: 1    multivitamin (THERAGRAN) per tablet, Take 1 tablet by mouth once daily., Disp: , Rfl:     omeprazole (PRILOSEC) 20 MG capsule, Take 1 capsule (20 mg total) by mouth once daily., Disp: 90 capsule, Rfl: 1    pyridoxine, vitamin B6, (B-6) 100 MG Tab, Take 50 mg by mouth once daily., Disp: , Rfl:     vitamin D (VITAMIN D3) 1000 units Tab, Take 1,000 Units by mouth once daily., Disp: , Rfl:     zinc gluconate 50 mg tablet, Take 50 mg by mouth once daily., Disp: , Rfl:     meloxicam (MOBIC) 15 MG tablet, Take 1 tablet (15 mg total) by mouth once daily. (Patient not taking: Reported on 5/18/2022), Disp: 90 tablet, Rfl: 1    mupirocin (BACTROBAN) 2 % ointment, Apply topically 2 (two) times daily. for 7 days, Disp: 30 g, Rfl: 0    Review of Systems   Constitutional: Negative for appetite change, chills, fatigue and fever.   Eyes: Negative for visual disturbance.   Respiratory: Negative for cough and shortness of breath.    Cardiovascular: Negative for chest pain, palpitations and leg swelling.   Gastrointestinal: Negative for abdominal pain, change in bowel habit, constipation, diarrhea, nausea, vomiting and change in bowel habit.   Endocrine: Negative for polydipsia.   Genitourinary: Negative for dysuria.   Musculoskeletal: Positive for arthralgias and back pain. Negative for gait problem and myalgias.        Bilateral leg and foot cramps  Decreased sensation of bilateral feet    Integumentary:  Negative for wound.   Neurological: Negative for dizziness, syncope, light-headedness and headaches.   Psychiatric/Behavioral: Negative for confusion and sleep disturbance.       Objective     BP (!) 141/70   Pulse 75   Temp 97.7 °F (36.5 °C)   Ht 5' 2" (1.575 m)   Wt 68 kg (150 lb)   SpO2 99%   BMI 27.44 kg/m²     Physical Exam   Constitutional: She is oriented to person, place, and time. No distress.   HENT:   Head: Normocephalic. "   Mouth/Throat: Mucous membranes are moist.   Eyes: Pupils are equal, round, and reactive to light. Conjunctivae are normal.   Cardiovascular: Normal rate and regular rhythm.   Pulses:       Posterior tibial pulses are 2+ on the right side and 2+ on the left side.   Pulmonary/Chest: Effort normal and breath sounds normal. No respiratory distress. She has no wheezes. She has no rhonchi. She has no rales.   Abdominal: Soft. Bowel sounds are normal. She exhibits no distension. There is no abdominal tenderness.   Musculoskeletal:         General: Normal range of motion.      Cervical back: Normal range of motion and neck supple.      Right lower leg: No edema.      Left lower leg: No edema.   Neurological: She is alert and oriented to person, place, and time. Gait normal.   Skin: Skin is warm and dry.        Psychiatric: Her behavior is normal. Mood normal.       Lab Results   Component Value Date    WBC 7.14 10/04/2021    HGB 11.4 (L) 10/04/2021    HCT 36.1 (L) 10/04/2021    MCV 97.3 (H) 10/04/2021     10/04/2021       CMP  Sodium   Date Value Ref Range Status   10/04/2021 145 136 - 145 mmol/L Final     Potassium   Date Value Ref Range Status   10/04/2021 5.0 3.5 - 5.1 mmol/L Final     Chloride   Date Value Ref Range Status   10/04/2021 111 (H) 98 - 107 mmol/L Final     CO2   Date Value Ref Range Status   10/04/2021 27 21 - 32 mmol/L Final     Glucose   Date Value Ref Range Status   10/04/2021 105 74 - 106 mg/dL Final     BUN   Date Value Ref Range Status   10/04/2021 22 (H) 7 - 18 mg/dL Final     Creatinine   Date Value Ref Range Status   10/04/2021 0.79 0.55 - 1.02 mg/dL Final     Calcium   Date Value Ref Range Status   10/04/2021 9.3 8.5 - 10.1 mg/dL Final     Total Protein   Date Value Ref Range Status   10/04/2021 7.4 6.4 - 8.2 g/dL Final     Albumin   Date Value Ref Range Status   10/04/2021 3.8 3.5 - 5.0 g/dL Final     Bilirubin, Total   Date Value Ref Range Status   10/04/2021 0.4 >0.0 - 1.2 mg/dL Final      Alk Phos   Date Value Ref Range Status   10/04/2021 64 55 - 142 U/L Final     AST   Date Value Ref Range Status   10/04/2021 23 15 - 37 U/L Final     ALT   Date Value Ref Range Status   10/04/2021 32 13 - 56 U/L Final     Anion Gap   Date Value Ref Range Status   10/04/2021 12 7 - 16 mmol/L Final     eGFR   Date Value Ref Range Status   10/04/2021 77 >=60 mL/min/1.73m² Final     Lab Results   Component Value Date    TSH 2.210 10/04/2021     Lab Results   Component Value Date    CHOL 185 10/04/2021     Lab Results   Component Value Date    HDL 71 (H) 10/04/2021     Lab Results   Component Value Date    LDLCALC 101 10/04/2021     Lab Results   Component Value Date    TRIG 65 10/04/2021     Lab Results   Component Value Date    CHOLHDL 2.6 10/04/2021     Lab Results   Component Value Date    HGBA1C 5.9 04/05/2022         Assessment and Plan   Skin rash  -     mupirocin (BACTROBAN) 2 % ointment; Apply topically 2 (two) times daily. for 7 days  Dispense: 30 g; Refill: 0    Leg cramps  -     Comprehensive Metabolic Panel; Future; Expected date: 05/18/2022  -     CBC Auto Differential; Future; Expected date: 05/18/2022  -     Vitamin B12; Future; Expected date: 05/18/2022  -     Vitamin D; Future; Expected date: 05/18/2022  -     Magnesium; Future; Expected date: 05/18/2022    Decreased sensation of foot  -     Vitamin B12; Future; Expected date: 05/18/2022  -     Vitamin D; Future; Expected date: 05/18/2022  -     Ambulatory referral/consult to Neurology; Future; Expected date: 05/25/2022    Benign essential HTN    Iron deficiency anemia, unspecified iron deficiency anemia type    Prediabetes    History of lumbar spinal fusion  -     Ambulatory referral/consult to Neurology; Future; Expected date: 05/25/2022    Dorsalgia, unspecified  -     MRI Lumbar Spine Without Contrast; Future; Expected date: 05/18/2022    Vitamin D deficiency, unspecified   -     Vitamin D; Future; Expected date: 05/18/2022    Fall at home,  initial encounter          Patient Instructions  Patient Instructions   Lab obtained in clinic today, we will notify you of results and any necessary changes to plan of care   MRI lumbar spine without contrast ordered will notify of results when available  EMG/NCV ordered, this will be at neurology, Dr Valle, results will be forwarded here and we will notify you of results when they are available    Bactroban to skin rash on left calf x 7 days

## 2022-05-22 DIAGNOSIS — Z98.1 HISTORY OF LUMBAR SPINAL FUSION: Primary | ICD-10-CM

## 2022-05-22 DIAGNOSIS — R93.7 ABNORMAL MRI, LUMBAR SPINE: ICD-10-CM

## 2022-05-25 ENCOUNTER — OFFICE VISIT (OUTPATIENT)
Dept: NEUROLOGY | Facility: CLINIC | Age: 72
End: 2022-05-25
Payer: MEDICARE

## 2022-05-25 VITALS
RESPIRATION RATE: 20 BRPM | OXYGEN SATURATION: 99 % | WEIGHT: 154.5 LBS | HEART RATE: 81 BPM | BODY MASS INDEX: 28.43 KG/M2 | HEIGHT: 62 IN

## 2022-05-25 DIAGNOSIS — Z98.1 HISTORY OF LUMBAR SPINAL FUSION: Primary | ICD-10-CM

## 2022-05-25 PROCEDURE — 99203 PR OFFICE/OUTPT VISIT, NEW, LEVL III, 30-44 MIN: ICD-10-PCS | Mod: S$PBB,,, | Performed by: PSYCHIATRY & NEUROLOGY

## 2022-05-25 PROCEDURE — 99203 OFFICE O/P NEW LOW 30 MIN: CPT | Mod: S$PBB,,, | Performed by: PSYCHIATRY & NEUROLOGY

## 2022-05-25 PROCEDURE — 99215 OFFICE O/P EST HI 40 MIN: CPT | Mod: PBBFAC | Performed by: PSYCHIATRY & NEUROLOGY

## 2022-05-25 RX ORDER — PRAMIPEXOLE DIHYDROCHLORIDE 0.5 MG/1
0.5 TABLET ORAL 3 TIMES DAILY
Qty: 270 TABLET | Refills: 3 | Status: SHIPPED | OUTPATIENT
Start: 2022-05-25 | End: 2022-10-05

## 2022-05-25 NOTE — PROGRESS NOTES
Subjective:       Patient ID: Lynda Parr is a 71 y.o. female     Chief Complaint:    Chief Complaint   Patient presents with    Leg Pain     Gets cramps in legs and now she is beginning to lose feeling in feet and moving into the ankles        Allergies:  Ace inhibitors    Current Medications:    Outpatient Encounter Medications as of 5/25/2022   Medication Sig Dispense Refill    aspirin 81 MG Chew Take 81 mg by mouth once daily.      ezetimibe (ZETIA) 10 mg tablet Take 1 tablet (10 mg total) by mouth once daily. 90 tablet 1    ferrous sulfate (FEOSOL) 325 mg (65 mg iron) Tab tablet Take 325 mg by mouth daily with breakfast.      losartan (COZAAR) 25 MG tablet Take 1 tablet (25 mg total) by mouth once daily. 90 tablet 1    magnesium oxide 200 mg magnesium Tab Take 1 tablet by mouth once daily.      metFORMIN (GLUCOPHAGE) 500 MG tablet Take 1 tablet (500 mg total) by mouth 3 (three) times daily. 270 tablet 1    multivitamin (THERAGRAN) per tablet Take 1 tablet by mouth once daily.      mupirocin (BACTROBAN) 2 % ointment Apply topically 2 (two) times daily. for 7 days 30 g 0    omeprazole (PRILOSEC) 20 MG capsule Take 1 capsule (20 mg total) by mouth once daily. 90 capsule 1    pyridoxine, vitamin B6, (B-6) 100 MG Tab Take 50 mg by mouth once daily.      vitamin D (VITAMIN D3) 1000 units Tab Take 1,000 Units by mouth once daily.      zinc gluconate 50 mg tablet Take 50 mg by mouth once daily.      [DISCONTINUED] meloxicam (MOBIC) 15 MG tablet Take 1 tablet (15 mg total) by mouth once daily. (Patient not taking: Reported on 5/18/2022) 90 tablet 1     No facility-administered encounter medications on file as of 5/25/2022.       History of Present Illness  72 yo WF in clinic for eval of LE muscle contractures radiating upwards - denies any paresthesias such as numbness, tingling and burning sensations - denies any radicular sensations   She is s/p lumbar fusion 2.5 yrs ago per Dr Clark who last saw  "her 7 months ago   Recent MRI L spine showed signif DDD but severe central canal stenosis and prev lumbar fusion   Occasional dorsiflexion of R foot and contractures   She took bad fall one month ago which may have exacerbated symptoms        Past Medical History:   Diagnosis Date    Benign essential HTN     Carpal tunnel syndrome, bilateral upper limbs     Chronic low back pain     Degeneration, intervertebral disc, lumbosacral     GERD (gastroesophageal reflux disease)     Hypercholesterolemia     Insomnia     Iron deficiency anemia     Localized swelling, mass and lump, right lower limb     Lower extremity edema     Lumbar spondylosis     Prediabetes     Restless leg     Slow transit constipation     Spinal stenosis, lumbar     Vitamin D deficiency        Past Surgical History:   Procedure Laterality Date    CATARACT EXTRACTION Left 09/10/2020    CATARACT EXTRACTION Right 10/01/2020    Dr. Stephen Fox    CHOLECYSTECTOMY  1990    COSMETIC SURGERY      eyelid    HIP ARTHROPLASTY Left 10/2015    repair of torn gluteus medius muscle via hip [facility]rosc[facility], Dr. Kent    SHOULDER ARTHROSCOPY W/ ROTATOR CUFF REPAIR  2005    SINUS SURGERY  02/2011    TUBAL LIGATION      VASCULAR SURGERY  12/15/2017    R [facility] Laser Ablation per Dr. Herman Bullard       Social History  Ms. Parr  reports that she has never smoked. She has never used smokeless tobacco. She reports current alcohol use. She reports that she does not use drugs.    Family History  Ms.'s Parr family history includes Coronary artery disease in her father; Hypertension in her mother; No Known Problems in her son; Pulmonary embolism in her brother.    Review of Systems  Review of Systems   Neurological: Positive for tingling and sensory change.      Objective:   Pulse 81   Resp 20   Ht 5' 2" (1.575 m)   Wt 70.1 kg (154 lb 8 oz)   SpO2 99%   BMI 28.26 kg/m²    NEUROLOGICAL EXAMINATION:     MENTAL STATUS   Oriented " to person, place, and time.   Level of consciousness: alert    CRANIAL NERVES   Cranial nerves II through XII intact.     MOTOR EXAM   Muscle bulk: normal  Overall muscle tone: normal    Strength   Strength 5/5 throughout.     REFLEXES     Reflexes   Reflexes 2+ except as noted.   Right brachioradialis: 2+  Left brachioradialis: 2+  Right biceps: 2+  Left biceps: 2+  Right triceps: 2+  Left triceps: 2+  Right patellar: 4+  Left patellar: 4+  Right achilles: 2+  Left achilles: 2+  Right : 2+  Left : 2+    SENSORY EXAM   Light touch normal.     GAIT AND COORDINATION        Antalgic gait         Physical Exam  Neurological:      Mental Status: She is oriented to person, place, and time. Mental status is at baseline.      Deep Tendon Reflexes: Strength normal.      Reflex Scores:       Tricep reflexes are 2+ on the right side and 2+ on the left side.       Bicep reflexes are 2+ on the right side and 2+ on the left side.       Brachioradialis reflexes are 2+ on the right side and 2+ on the left side.       Patellar reflexes are 4+ on the right side and 4+ on the left side.       Achilles reflexes are 2+ on the right side and 2+ on the left side.         Assessment:     History of lumbar spinal fusion  Comments:  PT w/ severe lumbar spinal stenosis and cerv myelopathy- brisk patellar DTR's  Orders:  -     Ambulatory referral/consult to Neurology         Primary Diagnosis and ICD10  History of lumbar spinal fusion [Z98.1]    Plan:     Patient Instructions   Ncv/emg both LE's - Dr Carmen at [facility]  Rerefer Dr Lorenzo Clark - Ortho Spine- eval lumbar spinal stenosis  Trial of Mirapex for RLS           Medications Discontinued During This Encounter   Medication Reason    meloxicam (MOBIC) 15 MG tablet        Requested Prescriptions      No prescriptions requested or ordered in this encounter

## 2022-05-25 NOTE — PATIENT INSTRUCTIONS
Ncv/emg both LE's - Dr Carmen at [facility]  Rerefer Dr Lorenzo Clark - Ortho Spine- eval lumbar spinal stenosis  Trial of Mirapex for RLS

## 2022-06-07 DIAGNOSIS — M54.16 LUMBAR RADICULOPATHY: Primary | ICD-10-CM

## 2022-06-08 ENCOUNTER — OFFICE VISIT (OUTPATIENT)
Dept: SPINE | Facility: CLINIC | Age: 72
End: 2022-06-08
Payer: MEDICARE

## 2022-06-08 ENCOUNTER — HOSPITAL ENCOUNTER (OUTPATIENT)
Dept: RADIOLOGY | Facility: HOSPITAL | Age: 72
Discharge: HOME | End: 2022-06-08
Attending: ORTHOPAEDIC SURGERY
Payer: MEDICARE

## 2022-06-08 DIAGNOSIS — M54.16 LUMBAR RADICULOPATHY: ICD-10-CM

## 2022-06-08 DIAGNOSIS — M48.062 LUMBAR STENOSIS WITH NEUROGENIC CLAUDICATION: ICD-10-CM

## 2022-06-08 DIAGNOSIS — M51.36 DDD (DEGENERATIVE DISC DISEASE), LUMBAR: ICD-10-CM

## 2022-06-08 DIAGNOSIS — M54.16 LUMBAR RADICULOPATHY: Primary | ICD-10-CM

## 2022-06-08 PROCEDURE — 72110 XR LUMBAR SPINE 4-5 VIEW WITH BENDING VIEWS: ICD-10-PCS | Mod: 26,,, | Performed by: ORTHOPAEDIC SURGERY

## 2022-06-08 PROCEDURE — 99214 PR OFFICE/OUTPT VISIT, EST, LEVL IV, 30-39 MIN: ICD-10-PCS | Mod: S$PBB,,, | Performed by: ORTHOPAEDIC SURGERY

## 2022-06-08 PROCEDURE — 99214 OFFICE O/P EST MOD 30 MIN: CPT | Mod: S$PBB,,, | Performed by: ORTHOPAEDIC SURGERY

## 2022-06-08 PROCEDURE — 72110 X-RAY EXAM L-2 SPINE 4/>VWS: CPT | Mod: 26,,, | Performed by: ORTHOPAEDIC SURGERY

## 2022-06-08 PROCEDURE — 72110 X-RAY EXAM L-2 SPINE 4/>VWS: CPT | Mod: TC

## 2022-06-08 PROCEDURE — 99212 OFFICE O/P EST SF 10 MIN: CPT | Mod: PBBFAC | Performed by: ORTHOPAEDIC SURGERY

## 2022-06-08 RX ORDER — GABAPENTIN 300 MG/1
300 CAPSULE ORAL 3 TIMES DAILY
Qty: 90 CAPSULE | Refills: 11 | Status: SHIPPED | OUTPATIENT
Start: 2022-06-08 | End: 2022-10-05

## 2022-06-08 NOTE — PROGRESS NOTES
AP, lateral views of the lumbar spine reviewed    On the AP there is normal coronal alignment.  There are 5 non-rib-bearing lumbar vertebrae.  On the lateral there is maintained lumbar lordosis.  She is status post L4-5 laminectomy and fusion with interbody.  No signs of hardware loosening or failure.    Impression:  Spondylotic changes of the lumbar spine as noted above

## 2022-06-11 NOTE — PROGRESS NOTES
MDM/time:  Greater than 30 minutes spent on this encounter including 10 minutes reviewing imaging and notes, 15 minutes with the patient, 5 minutes documentation    ASSESSMENT:  71 y.o. female with lumbar spondylosis and radiculopathy, status post L4-5 TLIF October 2019, now with back pain around the thoracolumbar junction    PLAN:  Have given her a new order for physical therapy and refilled her Neurontin.  She will follow-up with Dr. Delgado and consider injections.  Follow-up in 3 months.  Could be a candidate for L3-4 lateral fusion    HPI:  71 y.o. female here for repeat evaluation of lumbar spondylosis and back pain.  Status post L4-5 TLIF October 2019. Reports that lately she has had worsening back pain as well as fatigue in her legs with prolonged standing walking and pain into the bilateral hips and buttocks.  Denies any recent injuries.      IMAGING:  X-rays lumbar spine reviewed show:  On the AP there is normal coronal alignment.  There are 5 non-rib-bearing lumbar vertebrae.  On the lateral there is maintained lumbar lordosis.  She is status post L4-5 laminectomy and fusion with interbody.  No signs of hardware loosening or failure.    MRI lumbar spine 05/19/2022 reviewed shows:  At L3-4 there is severe central bilateral lateral recess stenosis.  Moderate bilateral foraminal stenosis  At L4-5 there is prior TLIF with satisfactory decompression  At L5-S1 there is right paracentral disc protrusion with moderate right lateral recess stenosis and moderate bilateral foraminal stenosis, consistent with prior MRI    Past Medical History:   Diagnosis Date    Benign essential HTN     Carpal tunnel syndrome, bilateral upper limbs     Chronic low back pain     Degeneration, intervertebral disc, lumbosacral     GERD (gastroesophageal reflux disease)     Hypercholesterolemia     Insomnia     Iron deficiency anemia     Localized swelling, mass and lump, right lower limb     Lower extremity edema      Lumbar spondylosis     Prediabetes     Restless leg     Slow transit constipation     Spinal stenosis, lumbar     Vitamin D deficiency      Past Surgical History:   Procedure Laterality Date    CATARACT EXTRACTION Left 09/10/2020    CATARACT EXTRACTION Right 10/01/2020    Dr. Stephen Fox    CHOLECYSTECTOMY  1990    COSMETIC SURGERY      eyelid    HIP ARTHROPLASTY Left 10/2015    repair of torn gluteus medius muscle via hip arthroscopy at [facility], Dr. Kent    SHOULDER ARTHROSCOPY W/ ROTATOR CUFF REPAIR  2005    SINUS SURGERY  02/2011    TUBAL LIGATION      VASCULAR SURGERY  12/15/2017    R [facility] Laser Ablation per Dr. Herman Bullard     Social History     Tobacco Use    Smoking status: Never Smoker    Smokeless tobacco: Never Used   Substance Use Topics    Alcohol use: Yes    Drug use: Never      Current Outpatient Medications   Medication Instructions    aspirin 81 mg, Oral, Daily    ezetimibe (ZETIA) 10 mg, Oral, Daily    ferrous sulfate (FEOSOL) 325 mg, Oral, With breakfast    gabapentin (NEURONTIN) 300 mg, Oral, 3 times daily    losartan (COZAAR) 25 mg, Oral, Daily    magnesium oxide 200 mg magnesium Tab 1 tablet, Oral, Daily    metFORMIN (GLUCOPHAGE) 500 mg, Oral, 3 times daily    multivitamin (THERAGRAN) per tablet 1 tablet, Oral, Daily    omeprazole (PRILOSEC) 20 mg, Oral, Daily    pramipexole (MIRAPEX) 0.5 mg, Oral, 3 times daily    pyridoxine (vitamin B6) (B-6) 50 mg, Oral, Daily    vitamin D (VITAMIN D3) 1,000 Units, Oral, Daily    zinc gluconate 50 mg, Oral, Daily        EXAM:  Constitutional  General Appearance:  There is no height or weight on file to calculate BMI., NAD  Psychiatric   Orientation: Oriented to time, oriented to place, oriented to person  Mood and Affect: Active and alert, normal mood, normal affect  Gait and Station   Appearance:  Normal gait, normal tandem gait, able to walk on toes, able to walk on heels  Healed incision  5/5  strength  Sensation intact  2+ pulses

## 2022-06-21 ENCOUNTER — CLINICAL SUPPORT (OUTPATIENT)
Dept: REHABILITATION | Facility: HOSPITAL | Age: 72
End: 2022-06-21
Payer: MEDICARE

## 2022-06-21 DIAGNOSIS — M54.50 LOW BACK PAIN, UNSPECIFIED BACK PAIN LATERALITY, UNSPECIFIED CHRONICITY, UNSPECIFIED WHETHER SCIATICA PRESENT: ICD-10-CM

## 2022-06-21 DIAGNOSIS — M54.16 LUMBAR RADICULOPATHY: ICD-10-CM

## 2022-06-21 PROCEDURE — 97163 PT EVAL HIGH COMPLEX 45 MIN: CPT

## 2022-06-21 NOTE — PLAN OF CARE
[facility]   Physical Therapy Initial Evaluation    Name: Francis Parr  Clinic Number: [id number]    Therapy Diagnosis:   Encounter Diagnoses   Name Primary?    Lumbar radiculopathy     Low back pain, unspecified back pain laterality, unspecified chronicity, unspecified whether sciatica present      Physician: Tony Clark MD     Physician Orders: PT Eval and Treat   Medical Diagnosis from Referral: Lumbar radiculopathy   Evaluation Date: 6/21/2022  Authorization Period Expiration: Lumbar radiculopathy [M54.16]  Plan of Care Expiration: 7/22/2022    Progress Note Due:   Visit # / Visits authorized: 1/ 1 (eval)  FOTO:1/3  PTA visit: 0/6    Precautions: Standard and Fall    Time In: 10:15 AM  Time Out: 11:48 AM  Total Treatment Time: 33 minutes    Subjective     Date of onset: 6 months ago    History of current condition - Francis reports: That she had her original back surgery in October 2019. She Had therapy before but did not improve. She states she is very active and is active with her gardening and will continue to due the things she likes even with her pain. Patient reports it has started to hurt so bad couple of months ago in apirl and she got an MRI done and got results of degeneartive changes at the level above and below her original surgery. She is going to need surgery again she states. She feels like she has restless leg syndrome in both of her legs, she has difficulty going up stairs as well.     Medical History:   Past Medical History:   Diagnosis Date    Benign essential HTN     Carpal tunnel syndrome, bilateral upper limbs     Chronic low back pain     Degeneration, intervertebral disc, lumbosacral     GERD (gastroesophageal reflux disease)     Hypercholesterolemia     Insomnia     Iron deficiency anemia     Localized swelling, mass and lump, right lower limb     Lower extremity edema     Lumbar spondylosis     Prediabetes     Restless leg     Slow transit  "constipation     Spinal stenosis, lumbar     Vitamin D deficiency        Surgical History:   Lynda Parr  has a past surgical history that includes Cataract extraction (Left, 09/10/2020); Cataract extraction (Right, 10/01/2020); Cholecystectomy (1990); Shoulder arthroscopy w/ rotator cuff repair (2005); Sinus surgery (02/2011); Tubal ligation; Vascular surgery (12/15/2017); Hip Arthroplasty (Left, 10/2015); and Cosmetic surgery.    Medications:   Lynda has a current medication list which includes the following prescription(s): aspirin, ezetimibe, ferrous sulfate, gabapentin, losartan, magnesium oxide, metformin, multivitamin, omeprazole, pramipexole, pyridoxine (vitamin b6), vitamin d, and zinc gluconate.    Allergies:   Review of patient's allergies indicates:   Allergen Reactions    Ace inhibitors Other (See Comments)        Imaging, MRI studies:     " Degenerative findings:     L3-L4: Severe spinal canal narrowing.  Moderate to severe bilateral neural foramen narrowing.  Circumferential disc bulge, facet change and ligamentum thickening     L5-S1: Moderate bilateral neural foramen narrowing.  Circumferential disc bulge and facet change.     Paraspinal muscles & soft tissues: Unremarkable."       Pain:  Current 5/10, worst 5/10, best 5/10   Location: bilateral back   Description: Aching, Dull, Tight and Tingling  Aggravating Factors: Sitting, Standing, Bending and Night Time  Easing Factors: nothing    Prior Therapy: Yes, no improvement with prior  Social History:  lives with their family  Occupation: retirerd  Prior Level of Function: indepedent  Current Level of Function: 50        Pts goals: decrease pain    Objective     Sensation:  Sensation is intact to light touch    (x = not tested due to pain and/or inability to obtain test position)    RANGE OF MOTION:    Lumbar AROM/PROM Right  (spine)  6/21/2022 Left    6/21/2022 Goal   Lumbar Flexion (60) 45 Pulling in low back and hips, left Hip --- " 55  Initial:    Lumbar Extension (30) 10 ! Pain --- 30  Initial:    Lumbar Side Bending (25) 15! 10 ! 20  Initial:        STRENGTH:      Hip/Knee MMT Right  6/21/2022 Goal   Hip Flexion  4-/5 5/5 B    Hip Extension  4-/5 5/5 B   Hip Abduction  3+/5 5/5 B   Knee Flexion 4-/5 5/5 B   Knee Extension 4/5 5/5 B     Hip/Knee MMT Left  6/21/2022 Goal   Hip Flexion  3+/5 5/5 B    Hip Extension  4-/5 5/5 B   Hip Abduction  3+/5 5/5 B   Knee Flexion 4+/5 5/5 B   Knee Extension 4+/5 5/5 B       Special Tests:   Test Right Left Goal   FADDIR negative negative Negative   KRISTY positive positive Negative   SLUMP negative negative Negative   Piriformis positive positive Negative   SLR positive negative Negative       Muscle Flexibility: Patient presents with flexibility limitations including but not limited to; lumbar paraspinals , quadratus lumborum  and hip flexors    Palpation: Increased tone and tenderness noted with palpation of bilateral lumbar paraspinals , quadratus lumborum , hip flexors and psoas. Increased tenderness noted with palpation of bilateral lumbar spinous processes.     Posture:  Pt presents with postural abnormalities which include: forward head and rounded shoulders     Gait Analysis: The patient ambulated with the following assistive device: none; the pt presents with the following gait abnormalities: decreased pelvic/trunk rotation and decreased reciprocal arm swing           CMS Impairment/Limitation/Restriction for FOTO Lumbar Survey    Therapist reviewed FOTO scores for Lynda Parr on 6/21/2022.   FOTO documents entered into [employer] - see Media section.    Limitation Score: 52%           TREATMENT     Total Billable time separate from Evaluation:  ricco Blanco received therapeutic exercises to develop strength, endurance, ROM, flexibility, posture and core stabilization for  minutes including:    TherEx 6/21/2022    Posterior Pelvic tilt     Supine March     Seated HS stretch            Plan for  Next Visit:        Home Exercises and Patient Education Provided    Education provided:    Patient educated on the impairments noted above and the effects of physical therapy intervention to improve overall condition and QOL.    Patient was educated on all the above exercise prior/during/after for proper posture, positioning, and execution for safe performance with home exercise program.    Written Home Exercises Provided: yes.  Exercises were reviewed and Francis was able to demonstrate them prior to the end of the session.  Francis demonstrated good understanding of the education provided.     See EMR under Patient Instructions for exercises provided 6/21/2022.    Assessment     Francis is a 71 y.o. female referred to outpatient Physical Therapy with a medical diagnosis of Lumbar radiculopathy. Francis presents with clinical signs and symptoms that support this diagnosis with decreased Lumbar ROM, decreased lower extremity strength, Lumbar joint(s) hypomobility, lower extremity neural tension, and impaired functional mobility. Radicular symptoms are present down into the bilateral lower extremity. The above impairments will be addressed through manual therapy techniques, therapeutic exercises, functional training, and modalities as necessary. Patient was treated and educated on exercises for home program, progression of therapy, and benefits of therapy to achieve full functional mobility. Patient will benefit from skilled physical therapy to meet short and long term goals and return to prior level of function.    Pt prognosis is Good.   Pt will benefit from skilled outpatient Physical Therapy to address the deficits stated above and in the chart below, provide pt/family education, and to maximize pt's level of independence.     Plan of care discussed with patient: Yes  Pt's spiritual, cultural and educational needs considered and patient is agreeable to the plan of care and goals as stated below:     Anticipated  Barriers for therapy: Prior lumbar surgery, chronic pain, pain with prom    Medical Necessity is demonstrated by the following  History  Co-morbidities and personal factors that may impact the plan of care Co-morbidities:   advanced age, diabetes, difficulty sleeping, HTN and prior lumbar surgery    Personal Factors:   age  coping style     high   Examination  Body Structures and Functions, activity limitations and participation restrictions that may impact the plan of care Body Regions:   back  lower extremities    Body Systems:    ROM  strength  balance  gait  motor control    Participation Restrictions:   none    Activity limitations:   Learning and applying knowledge  no deficits    General Tasks and Commands  no deficits    Communication  no deficits    Mobility  lifting and carrying objects  walking  moving around using equipment (WC)    Self care  no deficits    Domestic Life  cooking  doing house work (cleaning house, washing dishes, laundry)  assisting others    Interactions/Relationships  no deficits    Life Areas  no deficits    Community and Social Life  community life  recreation and leisure         high   Clinical Presentation unstable clinical presentation with unpredictable characteristics high   Decision Making/ Complexity Score: high     GOALS:  SHORT TERM GOALS: 2 weeks    1. Recent signs and systems trend is improving in order to progress towards LTG's.    2. Patient will be independent with HEP in order to further progress and return to maximal function.    3. Patient will improve AROM to 50% of stated goals, listed in objective measures above, in order to progress towards independence with functional activities.     4. Pain rating at Worst: 5/10 in order to progress towards increased independence with activity.    5. Patient will be able to correct postural deviations in sitting and standing, to decrease pain and promote postural awareness for injury prevention.       LONG TERM GOALS: 4 weeks     1. Patient will return to normal ADL, recreational, and work related activities with less pain and limitation.     2. Patient will improve AROM to stated goals in order to return to maximal functional potential.     3. Patient will improve Strength to stated goals of appropriate musculature in order to improve functional independence.     4. Pain Rating at Best: 1/10 to improve Quality of Life.               5. Patient will meet predicted functional outcome (FOTO) score: 10% to increase self-worth & perceived functional ability.                PM=Partially Met     PC=Progressing/Continued     M=Met    Plan   Plan of care Certification: 6/21/2022 to 7/22/2022.    Outpatient Physical Therapy 2 times weekly for 4 weeks to include any combination of the following interventions: virtual visits, dry needling, modalities, electrical stimulation (IFC, Pre-Mod, Attended with Functional Dry Needling), Manual Therapy, Moist Heat/ Ice, Neuromuscular Re-ed, Patient Education, Self Care, Therapeutic Activities, Therapeutic Exercise, Functional Training and Therapeutic Activites     Thank you for this referral.    These services are reasonable and necessary for the conditions set forth above while under my care.      Mary Bertrand, PT

## 2022-06-27 ENCOUNTER — CLINICAL SUPPORT (OUTPATIENT)
Dept: REHABILITATION | Facility: HOSPITAL | Age: 72
End: 2022-06-27
Payer: MEDICARE

## 2022-06-27 DIAGNOSIS — M54.40 CHRONIC BILATERAL LOW BACK PAIN WITH SCIATICA, SCIATICA LATERALITY UNSPECIFIED: Primary | ICD-10-CM

## 2022-06-27 DIAGNOSIS — G89.29 CHRONIC BILATERAL LOW BACK PAIN WITH SCIATICA, SCIATICA LATERALITY UNSPECIFIED: Primary | ICD-10-CM

## 2022-06-27 PROCEDURE — 97110 THERAPEUTIC EXERCISES: CPT | Mod: CQ

## 2022-06-27 NOTE — PROGRESS NOTES
"  Physical Therapy Treatment Note     Name: Francis Parr  Clinic Number: [id number]    Therapy Diagnosis:   Encounter Diagnosis   Name Primary?    Chronic bilateral low back pain with sciatica, sciatica laterality unspecified Yes     Physician: Tony Clark MD    Visit Date: 6/27/2022    Physician Orders: PT Eval and Treat   Medical Diagnosis from Referral: Lumbar radiculopathy   Evaluation Date: 6/21/2022  Authorization Period Expiration: Lumbar radiculopathy   Plan of Care Expiration: 7/22/2022   Updated Plan of Care Due: N/A  Visit # / Visits authorized: 2/9   PTA Visit #: 1    Time In: 1105  Time Out: 1141  Total Billable Time: 36 minutes    Precautions: Standard and Fall    Subjective     Pt reports: "I am always in pain".  She was compliant with home exercise program.    Pain: 5/10  Location: bilateral back      Objective     Francis received therapeutic exercises to develop strength for 36 minutes including:  NuStep x 6 min   Scap retract 2 x 10  Post pelvic tilt 2 x 10  Supine marching 2 x 10  Bridging 2 x 10  Seated forward therball rollouts x 10  TB shoulder rows with red band 2 x 10  Chair squats 2 x 10  HS stretch 3 x 10 sec     Home Exercises Provided and Patient Education Provided     Education provided: no new exercises added today     Written Home Exercises Provided: Patient instructed to cont prior HEP.  Exercises were reviewed and Francis was able to demonstrate them prior to the end of the session.  Francis demonstrated good  understanding of the education provided.     See EMR under Patient Instructions for exercises provided prior visit.    Assessment     Patient had no new complaints following therapy.  Francis Is progressing well towards her goals.   Pt prognosis is Good.     Pt will continue to benefit from skilled outpatient physical therapy to address the deficits listed in the problem list box on initial evaluation, provide pt/family education and to maximize pt's level of independence " in the home and community environment.     Pt's spiritual, cultural and educational needs considered and pt agreeable to plan of care and goals.     Anticipated barriers to physical therapy: Prior lumbar surgery, chronic pain, pain with prom    Goals:    SHORT TERM GOALS: 2 weeks     1. Recent signs and systems trend is improving in order to progress towards LTG's.     2. Patient will be independent with HEP in order to further progress and return to maximal function.     3. Patient will improve AROM to 50% of stated goals, listed in objective measures above, in order to progress towards independence with functional activities.      4. Pain rating at Worst: 5/10 in order to progress towards increased independence with activity.     5. Patient will be able to correct postural deviations in sitting and standing, to decrease pain and promote postural awareness for injury prevention.         LONG TERM GOALS: 4 weeks     1. Patient will return to normal ADL, recreational, and work related activities with less pain and limitation.      2. Patient will improve AROM to stated goals in order to return to maximal functional potential.      3. Patient will improve Strength to stated goals of appropriate musculature in order to improve functional independence.      4. Pain Rating at Best: 1/10 to improve Quality of Life.               5. Patient will meet predicted functional outcome (FOTO) score: 10% to increase self-worth & perceived functional ability.         Plan     Continue with appropriate POC     Mary Mullen, PTA  6/27/2022

## 2022-06-29 ENCOUNTER — CLINICAL SUPPORT (OUTPATIENT)
Dept: REHABILITATION | Facility: HOSPITAL | Age: 72
End: 2022-06-29
Payer: MEDICARE

## 2022-06-29 DIAGNOSIS — M54.50 LOW BACK PAIN, UNSPECIFIED BACK PAIN LATERALITY, UNSPECIFIED CHRONICITY, UNSPECIFIED WHETHER SCIATICA PRESENT: Primary | ICD-10-CM

## 2022-06-29 DIAGNOSIS — M54.16 LUMBAR RADICULOPATHY: ICD-10-CM

## 2022-06-29 PROCEDURE — 97110 THERAPEUTIC EXERCISES: CPT | Mod: CQ

## 2022-06-29 PROCEDURE — 97140 MANUAL THERAPY 1/> REGIONS: CPT | Mod: CQ

## 2022-06-29 NOTE — PROGRESS NOTES
"  Physical Therapy Treatment Note     Name: Francis Parr  Clinic Number: [id number]    Therapy Diagnosis:   Encounter Diagnoses   Name Primary?    Low back pain, unspecified back pain laterality, unspecified chronicity, unspecified whether sciatica present Yes    Lumbar radiculopathy      Physician: Tony Clark MD    Visit Date: 6/29/2022    Physician Orders: PT Eval and Treat   Medical Diagnosis from Referral: Lumbar radiculopathy   Evaluation Date: 6/21/2022  Authorization Period Expiration: Lumbar radiculopathy   Plan of Care Expiration: 7/22/2022   Updated Plan of Care Due: N/A  Visit # / Visits authorized: 3/9   PTA Visit #: 2    Time In: 1055  Time Out: 1133  Total Billable Time: 38 minutes    Precautions: Standard and Fall    Subjective     Pt reports: "I feel about the same as last time. No changes thus far".  She was compliant with home exercise program.    Pain: 5/10  Location: bilateral back      Objective     Francis received therapeutic exercises to develop strength for 30 minutes including:  NuStep x 6 min   Scap retract 2 x 10  Post pelvic tilt 2 x 10  Supine marching 2 x 10  Bridging 2 x 10  Seated forward therball rollouts x 10  TB shoulder rows with red band 2 x 10  Chair squats 2 x 10  HS stretch 3 x 10 sec (not today)    Francis received the following manual therapy techniques: Manual traction were applied to the: lumbar back for 8 minutes, including:  Lumbar traction using chair with intermittent holds 30" on/ 30" off    Home Exercises Provided and Patient Education Provided     Education provided: no new exercises added today     Written Home Exercises Provided: Patient instructed to cont prior HEP.  Exercises were reviewed and Francis was able to demonstrate them prior to the end of the session.  Francis demonstrated good  understanding of the education provided.     See EMR under Patient Instructions for exercises provided prior visit.    Assessment     Patient has most difficulty going " from sitting position to standing due to low back catching. Patient able to perform all exercises with minimal complaint.  Francis Is progressing well towards her goals.   Pt prognosis is Good.     Pt will continue to benefit from skilled outpatient physical therapy to address the deficits listed in the problem list box on initial evaluation, provide pt/family education and to maximize pt's level of independence in the home and community environment.     Pt's spiritual, cultural and educational needs considered and pt agreeable to plan of care and goals.     Anticipated barriers to physical therapy: Prior lumbar surgery, chronic pain, pain with prom    Goals:    SHORT TERM GOALS: 2 weeks     1. Recent signs and systems trend is improving in order to progress towards LTG's.     2. Patient will be independent with HEP in order to further progress and return to maximal function.     3. Patient will improve AROM to 50% of stated goals, listed in objective measures above, in order to progress towards independence with functional activities.      4. Pain rating at Worst: 5/10 in order to progress towards increased independence with activity.     5. Patient will be able to correct postural deviations in sitting and standing, to decrease pain and promote postural awareness for injury prevention.         LONG TERM GOALS: 4 weeks     1. Patient will return to normal ADL, recreational, and work related activities with less pain and limitation.      2. Patient will improve AROM to stated goals in order to return to maximal functional potential.      3. Patient will improve Strength to stated goals of appropriate musculature in order to improve functional independence.      4. Pain Rating at Best: 1/10 to improve Quality of Life.               5. Patient will meet predicted functional outcome (FOTO) score: 10% to increase self-worth & perceived functional ability.         Plan     Continue with appropriate POC     Lucho  Mary, PTA  6/29/2022

## 2022-07-05 ENCOUNTER — CLINICAL SUPPORT (OUTPATIENT)
Dept: REHABILITATION | Facility: HOSPITAL | Age: 72
End: 2022-07-05
Payer: MEDICARE

## 2022-07-05 DIAGNOSIS — M54.50 LOW BACK PAIN, UNSPECIFIED BACK PAIN LATERALITY, UNSPECIFIED CHRONICITY, UNSPECIFIED WHETHER SCIATICA PRESENT: Primary | ICD-10-CM

## 2022-07-05 PROCEDURE — 97110 THERAPEUTIC EXERCISES: CPT | Mod: CQ

## 2022-07-05 PROCEDURE — 97140 MANUAL THERAPY 1/> REGIONS: CPT | Mod: CQ

## 2022-07-05 NOTE — PROGRESS NOTES
Chronic Pain - New Consult    Referring Physician: Vicky Peres FNP       SUBJECTIVE: Disclaimer: This note has been generated using voice-recognition software. There may be typographical errors that have been missed during proof-reading      Initial encounter:    Lynda Parr presents to the clinic for the evaluation of lower back pain.       71-year-old female presents for re-evaluation of intractable lower back pain.  She was previously evaluated in 2019 and eventually required a TLIF at  L4-5 by Dr. Clark, October 2019. She did well but still experienced intermittent lower back pain.  Her pain was exacerbated after a fall, April 29, 2022,  on some stairs.  Lumbar spine  MRI  was obtained and she was re-evaluated for the by Dr. Clark.  She has weakness of the lower extremities,  hips and occasional paresthesia of the lower extremities.  Her pain is often worse at night, with sleeping and  early morning awakening.  She notes some difficulty with going from sitting to standing position.  She was also evaluated by Dr. Davalos  for lower extremity cramps.  She was referred to our clinic for nerve block injections prior to consideration of an L3-4 lumbar fusion.    Pain Assessment  Pain Score:   3  Pain Location: Other (Comment) (lower back)  Pain Descriptors: Aching, Burning, Dull, Sharp, Stabbing  Pain Frequency: Intermittent  Pain Onset: Awakened from sleep  Clinical Progression: Gradually improving  Aggravating Factors: Other (Comment) (sitting and lying)  Pain Intervention(s): Other (Comment) (position change)      Physical Therapy/Home Exercise: yes        Pain Medications:  has a current medication list which includes the following prescription(s): aspirin, ezetimibe, ferrous sulfate, losartan, magnesium oxide, metformin, multivitamin, omeprazole, pramipexole, pyridoxine (vitamin b6), vitamin d, zinc gluconate, and gabapentin.      Tried in Past:  NSAIDS-no  TCA-no  SNRI-no  Anti-convulsants-yes  Muscle  "Relaxants-yes  Opioids-no  Benzodiazepines-no     4A"s of Opioid Risk Assessment  Activity: Patient can partially perform  ADL  Analgesia:  Patient's pain is partially controlled by current medication.   Aberrant Behavior:  reviewed with no aberrant drug seeking/taking behavior     report:  Reviewed and consistent with medication use as prescribed.    Patient denies suicidal or homicidal ideations    Pain interventional therapy-no    Chiropractor -no  Acupuncture - no  TENS unit -no  Spinal decompression -yes  Joint replacement -no     Review of Systems   Constitutional: Negative.    HENT: Negative.    Eyes: Negative.    Respiratory: Negative.    Cardiovascular: Negative.    Gastrointestinal: Negative.    Endocrine: Negative.    Genitourinary: Negative.    Musculoskeletal: Positive for arthralgias, back pain and gait problem.   Integumentary:  Negative.   Hematological: Negative.    Psychiatric/Behavioral: Negative.              X-Ray Lumbar 4-5 View including Bending Views  See Procedure Notes for results.     IMPRESSION: Please see Ortho procedure notes for report.      This procedure was auto-finalized by: Virtual Radiologist         Past Medical History:   Diagnosis Date    Benign essential HTN     Carpal tunnel syndrome, bilateral upper limbs     Chronic low back pain     Degeneration, intervertebral disc, lumbosacral     GERD (gastroesophageal reflux disease)     Hypercholesterolemia     Insomnia     Iron deficiency anemia     Localized swelling, mass and lump, right lower limb     Lower extremity edema     Lumbar spondylosis     Prediabetes     Restless leg     Slow transit constipation     Spinal stenosis, lumbar     Vitamin D deficiency      Past Surgical History:   Procedure Laterality Date    CATARACT EXTRACTION Left 09/10/2020    CATARACT EXTRACTION Right 10/01/2020    Dr. Stephen Fox    CHOLECYSTECTOMY  1990    COSMETIC SURGERY      eyelid    HIP ARTHROPLASTY Left 10/2015    " "repair of torn gluteus medius muscle via hip arthroscopy at [facility], Dr. Kent    SHOULDER ARTHROSCOPY W/ ROTATOR CUFF REPAIR  2005    SINUS SURGERY  02/2011    TUBAL LIGATION      VASCULAR SURGERY  12/15/2017    [facility] per Dr. Herman Bullard     Social History     Socioeconomic History    Marital status:     Number of children: 1   Tobacco Use    Smoking status: Never Smoker    Smokeless tobacco: Never Used   Substance and Sexual Activity    Alcohol use: Yes    Drug use: Never    Sexual activity: Yes     Family History   Problem Relation Age of Onset    Hypertension Mother     Coronary artery disease Father     Pulmonary embolism Brother     No Known Problems Son      Review of patient's allergies indicates:   Allergen Reactions    Ace inhibitors Other (See Comments)         OBJECTIVE:  Vitals:    07/06/22 0834   BP: 139/69   Pulse: 84     /69   Pulse 84   Ht 5' 2.4" (1.585 m)   Wt 66.6 kg (146 lb 12.8 oz)   BMI 26.51 kg/m²   Physical Exam  Vitals and nursing note reviewed.   Constitutional:       General: She is not in acute distress.     Appearance: Normal appearance. She is not ill-appearing, toxic-appearing or diaphoretic.   HENT:      Head: Normocephalic and atraumatic.      Nose: Nose normal.      Mouth/Throat:      Mouth: Mucous membranes are moist.   Eyes:      Extraocular Movements: Extraocular movements intact.      Pupils: Pupils are equal, round, and reactive to light.   Cardiovascular:      Rate and Rhythm: Normal rate and regular rhythm.      Heart sounds: Normal heart sounds.   Pulmonary:      Effort: Pulmonary effort is normal. No respiratory distress.      Breath sounds: Normal breath sounds. No stridor. No wheezing or rhonchi.   Abdominal:      General: Bowel sounds are normal.      Palpations: Abdomen is soft.   Musculoskeletal:         General: No swelling or deformity.      Cervical back: Normal and normal range of motion. No spasms " or tenderness. No pain with movement. Normal range of motion.      Thoracic back: Normal.      Lumbar back: Tenderness and bony tenderness present. No spasms. Decreased range of motion. Negative right straight leg raise test and negative left straight leg raise test. No scoliosis.      Right lower leg: No edema.      Left lower leg: No edema.   Skin:     General: Skin is warm.   Neurological:      General: No focal deficit present.      Mental Status: She is alert and oriented to person, place, and time. Mental status is at baseline.      Cranial Nerves: No cranial nerve deficit.      Sensory: Sensation is intact. No sensory deficit.      Motor: No weakness.      Coordination: Coordination normal.      Gait: Gait normal.      Deep Tendon Reflexes:      Reflex Scores:       Patellar reflexes are 2+ on the right side and 2+ on the left side.       Achilles reflexes are 2+ on the right side and 1+ on the left side.     Comments: Difficulty with going from sitting to standing position   Psychiatric:         Mood and Affect: Mood normal.         Behavior: Behavior normal.            ASSESSMENT: 71 y.o. year old female with pain, consistent with     Encounter Diagnoses   Name Primary?    Low back pain, unspecified back pain laterality, unspecified chronicity, unspecified whether sciatica present Yes    History of lumbar spinal fusion     Abnormal MRI, lumbar spine     Encounter for long-term (current) use of other medications     Screening for viral disease         PLAN:   1. reviewed  2..Addiction, Dependency, Tolerance, Opioid abuse-misuse, Death, Diversion Discussed. Overdose reversal drug Naloxone discussed  2.UDS point of care obtained for new patient evaluation and consultation. We will obtain a definitve UDS for confirmation.  3. Opioid contract signed today  4. Continue medications for pain control and function       Requested Prescriptions      No prescriptions requested or ordered in this encounter      5. Urine drug screen and confirmation testing was ordered as documented on the requisition form in order to verify medication compliance, test for illicit substances.  6. Schedule L3-4 epidural steroid injection for lumbar radiculopathy    Orders Placed This Encounter   Procedures    Drug Screen Definitive 14, Urine     Standing Status:   Future     Number of Occurrences:   1     Standing Expiration Date:   9/4/2023     Order Specific Question:   Specimen Source     Answer:   Urine    COVID-19 Routine Screening     Standing Status:   Future     Standing Expiration Date:   9/4/2023     Order Specific Question:   Is the patient symptomatic?     Answer:   No     Order Specific Question:   Is this needed for pre-procedure or pre-op testing?     Answer:   Yes     Order Specific Question:   Diagnosis:     Answer:   Pre-op testing [[id number]]    POCT Urine Drug Screen Presump     Interpretive Information:     Negative:  No drug detected at the cut off level.   Positive:  This result represents presumptive positive for the   tested drug, other substances may yield a positive response other   than the analyte of interest. This result should be utilized for   diagnostic purpose only. Confirmation testing will be performed upon physician request only.       Case Request Operating Room: Injection-steroid-epidural-lumbar     Order Specific Question:   Medical Necessity:     Answer:   Medically Non-Urgent [100]     Order Specific Question:   CPT Code:     Answer:   HI INJ LUMBAR/SACRAL, W/IMAGING GUIDANCE [33405]     Order Specific Question:   Positioning:     Answer:   Prone [1003]     Order Specific Question:   Post-Procedure Disposition:     Answer:   PACU [1]     Order Specific Question:   Estimated Length of Stay:     Answer:   0 midnight     Order Specific Question:   Implant Required:     Answer:   No [1001]     Order Specific Question:   Is an on-site pathologist required for this procedure?     Answer:   N/A       7.Indications for this procedure for this specific patient include the following     - Pt has been in pain for at least 6 weeks and has failed conservative care (e.g. Exercise, physical methods, NSAID/ and or muscle relaxants)  - Pt has no major risk factors for spinal cancer or contraindicated condition   - Radicular pain is interfering with functional activity  - Pain is associated with symptoms of nerve root irritation   - Any numbness documented is accompanied with paresthesia   - No evidence of systemic or local infection, bleeding tendency or unstable medical condition   - Epidural provided as part of a comprehensive pain management program  - All repeat injections have at least 80% pain relief and increase functional gain and physical activity, and reduction in reliance on the use of medication and or physical therapy  - Pt has significant functional limitation resulting in diminished quality of life and impaired age appropriate ADL's.   - Diagnostic evaluation has ruled out other causes of pain  - Pt participating in an active rehabilitation program or home exercise program which has been discussed with the patient including heat ice and rest  - No more than 3 epidurals will be done in a 6 month period at the same level with at least 7 days between injections  - MAC is only offered in cases of needle phobia   - Injection done at L3-4 level which is consistent with patient's dermatomal pain complaint    8.Monitored Anesthesia Care medical necessity authorization request:    Monitor anesthesia request is medically indicated for the scheduled nerve block procedure due to:  - needle phobia and anxiety, placing  the patient at risk during the provided service.  -patient has severe problems with muscles and muscle spasticity that makes it hard to lie still  -patient suffers from chronic pain and is unable to function due to  diminished ADLs          The total time spent for evaluation and management on  07/06/2022 including reviewing separately obtained history, performing a medically appropriate exam and evaluation, documenting clinical information in the health record, independently interpreting results and communicating them to the patient/family/caregiver, and ordering medications/tests/procedures was between 15-29 minutes.    The above plan and management options were discussed at length with patient. Patient is in agreement with the above and verbalized understanding. It will be communicated with the referring physician via electronic record, fax, or mail.    Esther Delgado  07/06/2022

## 2022-07-05 NOTE — PROGRESS NOTES
"  Physical Therapy Treatment Note     Name: Francis Parr  Clinic Number: [id number]    Therapy Diagnosis:   Encounter Diagnosis   Name Primary?    Low back pain, unspecified back pain laterality, unspecified chronicity, unspecified whether sciatica present Yes     Physician: Tony Clark MD    Visit Date: 7/5/2022    Physician Orders: PT Eval and Treat   Medical Diagnosis from Referral: Lumbar radiculopathy   Evaluation Date: 6/21/2022  Authorization Period Expiration: Lumbar radiculopathy   Plan of Care Expiration: 7/22/2022   Updated Plan of Care Due: N/A  Visit # / Visits authorized: 4/9   PTA Visit #: 3    Time In: 1022  Time Out: 1100  Total Billable Time: 38 minutes    Precautions: Standard and Fall    Subjective     Pt reports: "It feels better today."  She was compliant with home exercise program.    Pain: 3/10  Location: bilateral back      Objective     Francis received therapeutic exercises to develop strength for 28 minutes including:  NuStep x 6 min   Scap retract 3 x 10  Post pelvic tilt 3 x 10  Supine marching 2 x 10  Bridging 3 x 10  DKTC 2 x 30"  Seated forward therball rollouts x 10  TB shoulder rows with red band 3 x 10  Chair squats 3 x 10  HS stretch 3 x 10 sec    Francis received the following manual therapy techniques: Manual traction were applied to the: lumbar back for 10 minutes, including:  Lumbar traction using chair with intermittent holds 30" on/ 30" off    Home Exercises Provided and Patient Education Provided     Education provided: no new exercises added today     Written Home Exercises Provided: Patient instructed to cont prior HEP.  Exercises were reviewed and Francis was able to demonstrate them prior to the end of the session.  Francis demonstrated good  understanding of the education provided.     See EMR under Patient Instructions for exercises provided prior visit.    Assessment     Patient still has most difficulty going from sitting position to standing due to low back " catching. Patient states lumbar traction using chair feels great. Patient stated her back felt good after treatment with no increase in pain or complaint.  Francis Is progressing well towards her goals.   Pt prognosis is Good.     Pt will continue to benefit from skilled outpatient physical therapy to address the deficits listed in the problem list box on initial evaluation, provide pt/family education and to maximize pt's level of independence in the home and community environment.     Pt's spiritual, cultural and educational needs considered and pt agreeable to plan of care and goals.     Anticipated barriers to physical therapy: Prior lumbar surgery, chronic pain, pain with prom    Goals:    SHORT TERM GOALS: 2 weeks     1. Recent signs and systems trend is improving in order to progress towards LTG's.     2. Patient will be independent with HEP in order to further progress and return to maximal function.     3. Patient will improve AROM to 50% of stated goals, listed in objective measures above, in order to progress towards independence with functional activities.      4. Pain rating at Worst: 5/10 in order to progress towards increased independence with activity.     5. Patient will be able to correct postural deviations in sitting and standing, to decrease pain and promote postural awareness for injury prevention.         LONG TERM GOALS: 4 weeks     1. Patient will return to normal ADL, recreational, and work related activities with less pain and limitation.      2. Patient will improve AROM to stated goals in order to return to maximal functional potential.      3. Patient will improve Strength to stated goals of appropriate musculature in order to improve functional independence.      4. Pain Rating at Best: 1/10 to improve Quality of Life.               5. Patient will meet predicted functional outcome (FOTO) score: 10% to increase self-worth & perceived functional ability.         Plan     Continue with  appropriate POC     Lucho Hernandez, PTA  7/5/2022

## 2022-07-05 NOTE — H&P (VIEW-ONLY)
Chronic Pain - New Consult    Referring Physician: Vicky Peres FNP       SUBJECTIVE: Disclaimer: This note has been generated using voice-recognition software. There may be typographical errors that have been missed during proof-reading      Initial encounter:    Lynda Parr presents to the clinic for the evaluation of lower back pain.       71-year-old female presents for re-evaluation of intractable lower back pain.  She was previously evaluated in 2019 and eventually required a TLIF at  L4-5 by Dr. Clark, October 2019. She did well but still experienced intermittent lower back pain.  Her pain was exacerbated after a fall, April 29, 2022,  on some stairs.  Lumbar spine  MRI  was obtained and she was re-evaluated for the by Dr. Clark.  She has weakness of the lower extremities,  hips and occasional paresthesia of the lower extremities.  Her pain is often worse at night, with sleeping and  early morning awakening.  She notes some difficulty with going from sitting to standing position.  She was also evaluated by Dr. Davalos  for lower extremity cramps.  She was referred to our clinic for nerve block injections prior to consideration of an L3-4 lumbar fusion.    Pain Assessment  Pain Score:   3  Pain Location: Other (Comment) (lower back)  Pain Descriptors: Aching, Burning, Dull, Sharp, Stabbing  Pain Frequency: Intermittent  Pain Onset: Awakened from sleep  Clinical Progression: Gradually improving  Aggravating Factors: Other (Comment) (sitting and lying)  Pain Intervention(s): Other (Comment) (position change)      Physical Therapy/Home Exercise: yes        Pain Medications:  has a current medication list which includes the following prescription(s): aspirin, ezetimibe, ferrous sulfate, losartan, magnesium oxide, metformin, multivitamin, omeprazole, pramipexole, pyridoxine (vitamin b6), vitamin d, zinc gluconate, and gabapentin.      Tried in Past:  NSAIDS-no  TCA-no  SNRI-no  Anti-convulsants-yes  Muscle  "Relaxants-yes  Opioids-no  Benzodiazepines-no     4A"s of Opioid Risk Assessment  Activity: Patient can partially perform  ADL  Analgesia:  Patient's pain is partially controlled by current medication.   Aberrant Behavior:  reviewed with no aberrant drug seeking/taking behavior     report:  Reviewed and consistent with medication use as prescribed.    Patient denies suicidal or homicidal ideations    Pain interventional therapy-no    Chiropractor -no  Acupuncture - no  TENS unit -no  Spinal decompression -yes  Joint replacement -no     Review of Systems   Constitutional: Negative.    HENT: Negative.    Eyes: Negative.    Respiratory: Negative.    Cardiovascular: Negative.    Gastrointestinal: Negative.    Endocrine: Negative.    Genitourinary: Negative.    Musculoskeletal: Positive for arthralgias, back pain and gait problem.   Integumentary:  Negative.   Hematological: Negative.    Psychiatric/Behavioral: Negative.              X-Ray Lumbar 4-5 View including Bending Views  See Procedure Notes for results.     IMPRESSION: Please see Ortho procedure notes for report.      This procedure was auto-finalized by: Virtual Radiologist         Past Medical History:   Diagnosis Date    Benign essential HTN     Carpal tunnel syndrome, bilateral upper limbs     Chronic low back pain     Degeneration, intervertebral disc, lumbosacral     GERD (gastroesophageal reflux disease)     Hypercholesterolemia     Insomnia     Iron deficiency anemia     Localized swelling, mass and lump, right lower limb     Lower extremity edema     Lumbar spondylosis     Prediabetes     Restless leg     Slow transit constipation     Spinal stenosis, lumbar     Vitamin D deficiency      Past Surgical History:   Procedure Laterality Date    CATARACT EXTRACTION Left 09/10/2020    CATARACT EXTRACTION Right 10/01/2020    Dr. Stephen Fox    CHOLECYSTECTOMY  1990    COSMETIC SURGERY      eyelid    HIP ARTHROPLASTY Left 10/2015    " "repair of torn gluteus medius muscle via hip arthroscopy at [facility], Dr. Kent    SHOULDER ARTHROSCOPY W/ ROTATOR CUFF REPAIR  2005    SINUS SURGERY  02/2011    TUBAL LIGATION      VASCULAR SURGERY  12/15/2017    [facility]ation per Dr. Herman Bullard     Social History     Socioeconomic History    Marital status:     Number of children: 1   Tobacco Use    Smoking status: Never Smoker    Smokeless tobacco: Never Used   Substance and Sexual Activity    Alcohol use: Yes    Drug use: Never    Sexual activity: Yes     Family History   Problem Relation Age of Onset    Hypertension Mother     Coronary artery disease Father     Pulmonary embolism Brother     No Known Problems Son      Review of patient's allergies indicates:   Allergen Reactions    Ace inhibitors Other (See Comments)         OBJECTIVE:  Vitals:    07/06/22 0834   BP: 139/69   Pulse: 84     /69   Pulse 84   Ht 5' 2.4" (1.585 m)   Wt 66.6 kg (146 lb 12.8 oz)   BMI 26.51 kg/m²   Physical Exam  Vitals and nursing note reviewed.   Constitutional:       General: She is not in acute distress.     Appearance: Normal appearance. She is not ill-appearing, toxic-appearing or diaphoretic.   HENT:      Head: Normocephalic and atraumatic.      Nose: Nose normal.      Mouth/Throat:      Mouth: Mucous membranes are moist.   Eyes:      Extraocular Movements: Extraocular movements intact.      Pupils: Pupils are equal, round, and reactive to light.   Cardiovascular:      Rate and Rhythm: Normal rate and regular rhythm.      Heart sounds: Normal heart sounds.   Pulmonary:      Effort: Pulmonary effort is normal. No respiratory distress.      Breath sounds: Normal breath sounds. No stridor. No wheezing or rhonchi.   Abdominal:      General: Bowel sounds are normal.      Palpations: Abdomen is soft.   Musculoskeletal:         General: No swelling or deformity.      Cervical back: Normal and normal range of motion. No spasms " or tenderness. No pain with movement. Normal range of motion.      Thoracic back: Normal.      Lumbar back: Tenderness and bony tenderness present. No spasms. Decreased range of motion. Negative right straight leg raise test and negative left straight leg raise test. No scoliosis.      Right lower leg: No edema.      Left lower leg: No edema.   Skin:     General: Skin is warm.   Neurological:      General: No focal deficit present.      Mental Status: She is alert and oriented to person, place, and time. Mental status is at baseline.      Cranial Nerves: No cranial nerve deficit.      Sensory: Sensation is intact. No sensory deficit.      Motor: No weakness.      Coordination: Coordination normal.      Gait: Gait normal.      Deep Tendon Reflexes:      Reflex Scores:       Patellar reflexes are 2+ on the right side and 2+ on the left side.       Achilles reflexes are 2+ on the right side and 1+ on the left side.     Comments: Difficulty with going from sitting to standing position   Psychiatric:         Mood and Affect: Mood normal.         Behavior: Behavior normal.            ASSESSMENT: 71 y.o. year old female with pain, consistent with     Encounter Diagnoses   Name Primary?    Low back pain, unspecified back pain laterality, unspecified chronicity, unspecified whether sciatica present Yes    History of lumbar spinal fusion     Abnormal MRI, lumbar spine     Encounter for long-term (current) use of other medications     Screening for viral disease         PLAN:   1. reviewed  2..Addiction, Dependency, Tolerance, Opioid abuse-misuse, Death, Diversion Discussed. Overdose reversal drug Naloxone discussed  2.UDS point of care obtained for new patient evaluation and consultation. We will obtain a definitve UDS for confirmation.  3. Opioid contract signed today  4. Continue medications for pain control and function       Requested Prescriptions      No prescriptions requested or ordered in this encounter      5. Urine drug screen and confirmation testing was ordered as documented on the requisition form in order to verify medication compliance, test for illicit substances.  6. Schedule L3-4 epidural steroid injection for lumbar radiculopathy    Orders Placed This Encounter   Procedures    Drug Screen Definitive 14, Urine     Standing Status:   Future     Number of Occurrences:   1     Standing Expiration Date:   9/4/2023     Order Specific Question:   Specimen Source     Answer:   Urine    COVID-19 Routine Screening     Standing Status:   Future     Standing Expiration Date:   9/4/2023     Order Specific Question:   Is the patient symptomatic?     Answer:   No     Order Specific Question:   Is this needed for pre-procedure or pre-op testing?     Answer:   Yes     Order Specific Question:   Diagnosis:     Answer:   Pre-op testing [271717]    POCT Urine Drug Screen Presump     Interpretive Information:     Negative:  No drug detected at the cut off level.   Positive:  This result represents presumptive positive for the   tested drug, other substances may yield a positive response other   than the analyte of interest. This result should be utilized for   diagnostic purpose only. Confirmation testing will be performed upon physician request only.       Case Request Operating Room: Injection-steroid-epidural-lumbar     Order Specific Question:   Medical Necessity:     Answer:   Medically Non-Urgent [100]     Order Specific Question:   CPT Code:     Answer:   LA INJ LUMBAR/SACRAL, W/IMAGING GUIDANCE [48079]     Order Specific Question:   Positioning:     Answer:   Prone [1003]     Order Specific Question:   Post-Procedure Disposition:     Answer:   PACU [1]     Order Specific Question:   Estimated Length of Stay:     Answer:   0 midnight     Order Specific Question:   Implant Required:     Answer:   No [1001]     Order Specific Question:   Is an on-site pathologist required for this procedure?     Answer:   N/A       7.Indications for this procedure for this specific patient include the following     - Pt has been in pain for at least 6 weeks and has failed conservative care (e.g. Exercise, physical methods, NSAID/ and or muscle relaxants)  - Pt has no major risk factors for spinal cancer or contraindicated condition   - Radicular pain is interfering with functional activity  - Pain is associated with symptoms of nerve root irritation   - Any numbness documented is accompanied with paresthesia   - No evidence of systemic or local infection, bleeding tendency or unstable medical condition   - Epidural provided as part of a comprehensive pain management program  - All repeat injections have at least 80% pain relief and increase functional gain and physical activity, and reduction in reliance on the use of medication and or physical therapy  - Pt has significant functional limitation resulting in diminished quality of life and impaired age appropriate ADL's.   - Diagnostic evaluation has ruled out other causes of pain  - Pt participating in an active rehabilitation program or home exercise program which has been discussed with the patient including heat ice and rest  - No more than 3 epidurals will be done in a 6 month period at the same level with at least 7 days between injections  - MAC is only offered in cases of needle phobia   - Injection done at L3-4 level which is consistent with patient's dermatomal pain complaint    8.Monitored Anesthesia Care medical necessity authorization request:    Monitor anesthesia request is medically indicated for the scheduled nerve block procedure due to:  - needle phobia and anxiety, placing  the patient at risk during the provided service.  -patient has severe problems with muscles and muscle spasticity that makes it hard to lie still  -patient suffers from chronic pain and is unable to function due to  diminished ADLs          The total time spent for evaluation and management on  07/06/2022 including reviewing separately obtained history, performing a medically appropriate exam and evaluation, documenting clinical information in the health record, independently interpreting results and communicating them to the patient/family/caregiver, and ordering medications/tests/procedures was between 15-29 minutes.    The above plan and management options were discussed at length with patient. Patient is in agreement with the above and verbalized understanding. It will be communicated with the referring physician via electronic record, fax, or mail.    Esther Delgado  07/06/2022

## 2022-07-06 ENCOUNTER — OFFICE VISIT (OUTPATIENT)
Dept: PAIN MEDICINE | Facility: CLINIC | Age: 72
End: 2022-07-06
Payer: MEDICARE

## 2022-07-06 VITALS
HEIGHT: 62 IN | DIASTOLIC BLOOD PRESSURE: 69 MMHG | BODY MASS INDEX: 27.02 KG/M2 | WEIGHT: 146.81 LBS | HEART RATE: 84 BPM | SYSTOLIC BLOOD PRESSURE: 139 MMHG

## 2022-07-06 DIAGNOSIS — R93.7 ABNORMAL MRI, LUMBAR SPINE: ICD-10-CM

## 2022-07-06 DIAGNOSIS — Z79.899 ENCOUNTER FOR LONG-TERM (CURRENT) USE OF OTHER MEDICATIONS: ICD-10-CM

## 2022-07-06 DIAGNOSIS — Z11.59 SCREENING FOR VIRAL DISEASE: ICD-10-CM

## 2022-07-06 DIAGNOSIS — M54.50 LOW BACK PAIN, UNSPECIFIED BACK PAIN LATERALITY, UNSPECIFIED CHRONICITY, UNSPECIFIED WHETHER SCIATICA PRESENT: Primary | Chronic | ICD-10-CM

## 2022-07-06 DIAGNOSIS — Z98.1 HISTORY OF LUMBAR SPINAL FUSION: Chronic | ICD-10-CM

## 2022-07-06 LAB

## 2022-07-06 PROCEDURE — 80305 DRUG TEST PRSMV DIR OPT OBS: CPT | Mod: PBBFAC | Performed by: PAIN MEDICINE

## 2022-07-06 PROCEDURE — 99214 OFFICE O/P EST MOD 30 MIN: CPT | Mod: S$PBB,,, | Performed by: PAIN MEDICINE

## 2022-07-06 PROCEDURE — 99214 PR OFFICE/OUTPT VISIT, EST, LEVL IV, 30-39 MIN: ICD-10-PCS | Mod: S$PBB,,, | Performed by: PAIN MEDICINE

## 2022-07-06 PROCEDURE — G0481 PR DRUG TEST DEF 8-14 CLASSES: ICD-10-PCS | Mod: ,,, | Performed by: CLINICAL MEDICAL LABORATORY

## 2022-07-06 PROCEDURE — 99215 OFFICE O/P EST HI 40 MIN: CPT | Mod: PBBFAC | Performed by: PAIN MEDICINE

## 2022-07-06 PROCEDURE — G0481 DRUG TEST DEF 8-14 CLASSES: HCPCS | Mod: ,,, | Performed by: CLINICAL MEDICAL LABORATORY

## 2022-07-06 NOTE — PATIENT INSTRUCTIONS
L3-L4 CHRIS   NO SEDATION PER PT REQUEST   7- AT 2PM      ALL PATIENTS TO HAVE COVID TESTING TO BE DONE 48-72 HOURS PRIOR TO PROCEDURE IF PT HAS NOT RECEIVED BOTH COVID VACCINATIONS OR HAS BEEN VACCINATED WITHIN THE LAST 2 WEEKS.     IF PATIENT HAD BOTH VACCINES AT GREATER THAN  TWO WEEKS PRIOR TO PROCEDURE , PT DOES NOT HAVE TO HAVE COVID TESTING, VACCINATION CARD MUST BE PROVIDED PT MUST HAVE COVID TESTING IN ORDER TO HAVE PROCEDURE.     IF YOUR  PROCEDURE IS ON A Tuesday, GET COVID TESTING ON THE  Friday BEFORE PROCEDURE.    IF YOUR PROCEDURE IS ON Thursday, HAVE COVID TESTING ON THE Monday OR Tuesday PRIOR TO PROCEDURE    COVID TESTING TO BE DONE AT [facility] IN THE LAB DEPARTMENT OR YOUR PRIMARY CARE DOCTOR MAY ORDER IT FOR YOU.    IF YOUR PRIMARY  CARE DOCTOR ORDERS YOUR COVID TESTING YOU MUST BRING YOUR RESULTS WITH YOU TO YOUR PROCEDURE.    Procedure Instructions:    Nothing to eat or drink for 8 hours or after midnight including gum, candy, mints, or tobacco products.  If you are scheduled for 1:30 or later nothing to eat or drink after 5 a.m. the morning of the procedure, including gum, candy, mints, or tobacco products.  Must have a  at least 18 yrs of age to stay present at all times  No Diabetic medications the morning of procedure, check blood sugar the morning of procedure, if it is greater than 200 call the office at [phone]  If you are started on antibiotics or have been prescribed antibiotics, have a fever, or have any other type of infection call to reschedule procedure.  If you take blood pressure medications you can take it at your regular scheduled time with a small sip of WATER!    HOLD ASPIRIN AND ASPIRIN PRODUCTS  (ASPIRIN, BC POWDER ETC. ) FOR 7 DAYS  PRIOR TO PROCEDURE  HOLD NSAIDS( ibuprofen, mobic, meloxicam, advil, diclofenac, naproxen, relafen, celebrex,  methotrexate, aleve etc....)  FOR 3 DAYS   PRIOR TO PROCEDURE

## 2022-07-07 ENCOUNTER — CLINICAL SUPPORT (OUTPATIENT)
Dept: REHABILITATION | Facility: HOSPITAL | Age: 72
End: 2022-07-07
Payer: MEDICARE

## 2022-07-07 DIAGNOSIS — M54.50 LOW BACK PAIN, UNSPECIFIED BACK PAIN LATERALITY, UNSPECIFIED CHRONICITY, UNSPECIFIED WHETHER SCIATICA PRESENT: Primary | ICD-10-CM

## 2022-07-07 PROCEDURE — 97110 THERAPEUTIC EXERCISES: CPT | Mod: CQ

## 2022-07-07 PROCEDURE — 97140 MANUAL THERAPY 1/> REGIONS: CPT | Mod: CQ

## 2022-07-07 NOTE — PROGRESS NOTES
"  Physical Therapy Treatment Note     Name: Francis Parr  Clinic Number: [id number]    Therapy Diagnosis:   Encounter Diagnosis   Name Primary?    Low back pain, unspecified back pain laterality, unspecified chronicity, unspecified whether sciatica present Yes     Physician: Tony Clark MD    Visit Date: 7/7/2022    Physician Orders: PT Eval and Treat   Medical Diagnosis from Referral: Lumbar radiculopathy   Evaluation Date: 6/21/2022  Authorization Period Expiration: Lumbar radiculopathy   Plan of Care Expiration: 7/22/2022   Updated Plan of Care Due: N/A  Visit # / Visits authorized: 5/9   PTA Visit #: 4    Time In: 1015  Time Out: 1055  Total Billable Time: 40 minutes    Precautions: Standard and Fall    Subjective     Pt reports: "My back felt really good yesterday so I did some yard work and I'm paying for it today."  She was compliant with home exercise program.    Pain: 7/10  Location: bilateral back    Objective     Francis received therapeutic exercises to develop strength for 32 minutes including:  NuStep x 6 min  Scap retract 3 x 10  Post pelvic tilt 3 x 10  Supine marching 3 x 10  HL hip abduction RTB 3 x 10  Bridging 3 x 10  DKTC 2 x 30"  Seated forward therball rollouts x 10 (not today)  TB shoulder rows with green band 3 x 10  Chair squats 3 x 10  HS stretch 3 x 10 sec (not today)    Francis received the following manual therapy techniques: Manual traction were applied to the: lumbar back for 8 minutes, including:  Lumbar traction using chair with intermittent holds 30" on/ 30" off    Home Exercises Provided and Patient Education Provided     Education provided: no new exercises added today     Written Home Exercises Provided: Patient instructed to cont prior HEP.  Exercises were reviewed and Francis was able to demonstrate them prior to the end of the session.  Francis demonstrated good  understanding of the education provided.     See EMR under Patient Instructions for exercises provided prior " visit.    Assessment     Patient stated her back felt better after treatment with no increase in pain or complaint.    Francis Is progressing well towards her goals.   Pt prognosis is Good.     Pt will continue to benefit from skilled outpatient physical therapy to address the deficits listed in the problem list box on initial evaluation, provide pt/family education and to maximize pt's level of independence in the home and community environment.     Pt's spiritual, cultural and educational needs considered and pt agreeable to plan of care and goals.     Anticipated barriers to physical therapy: Prior lumbar surgery, chronic pain, pain with prom    Goals:    SHORT TERM GOALS: 2 weeks     1. Recent signs and systems trend is improving in order to progress towards LTG's.     2. Patient will be independent with HEP in order to further progress and return to maximal function.     3. Patient will improve AROM to 50% of stated goals, listed in objective measures above, in order to progress towards independence with functional activities.      4. Pain rating at Worst: 5/10 in order to progress towards increased independence with activity.     5. Patient will be able to correct postural deviations in sitting and standing, to decrease pain and promote postural awareness for injury prevention.         LONG TERM GOALS: 4 weeks     1. Patient will return to normal ADL, recreational, and work related activities with less pain and limitation.      2. Patient will improve AROM to stated goals in order to return to maximal functional potential.      3. Patient will improve Strength to stated goals of appropriate musculature in order to improve functional independence.      4. Pain Rating at Best: 1/10 to improve Quality of Life.               5. Patient will meet predicted functional outcome (FOTO) score: 10% to increase self-worth & perceived functional ability.         Plan     Continue with appropriate POC     Lucho Hernandez  PTA  7/7/2022

## 2022-07-08 ENCOUNTER — PATIENT MESSAGE (OUTPATIENT)
Dept: FAMILY MEDICINE | Facility: CLINIC | Age: 72
End: 2022-07-08
Payer: MEDICARE

## 2022-07-08 LAB
6-ACETYLMORPHINE, URINE (RUSH): NEGATIVE 10 NG/ML
7-AMINOCLONAZEPAM, URINE (RUSH): NEGATIVE 25 NG/ML
A-HYDROXYALPRAZOLAM, URINE (RUSH): NEGATIVE 25 NG/ML
ACETYL FENTANYL, URINE (RUSH): NEGATIVE 2.5 NG/ML
ACETYL NORFENTANYL OXALATE, URINE (RUSH): NEGATIVE 5 NG/ML
AMPHET UR QL SCN: NEGATIVE
BENZOYLECGONINE, URINE (RUSH): NEGATIVE 100 NG/ML
BUPRENORPHINE UR QL SCN: NEGATIVE 25 NG/ML
CODEINE, URINE (RUSH): NEGATIVE 25 NG/ML
CREAT UR-MCNC: 75 MG/DL (ref 28–219)
EDDP, URINE (RUSH): NEGATIVE 25 NG/ML
FENTANYL, URINE (RUSH): NEGATIVE 2.5 NG/ML
HYDROCODONE, URINE (RUSH): NEGATIVE 25 NG/ML
HYDROMORPHONE, URINE (RUSH): NEGATIVE 25 NG/ML
LORAZEPAM, URINE (RUSH): NEGATIVE 25 NG/ML
METHADONE UR QL SCN: NEGATIVE 25 NG/ML
METHAMPHET UR QL SCN: NEGATIVE
MORPHINE, URINE (RUSH): NEGATIVE 25 NG/ML
NORBUPRENORPHINE, URINE (RUSH): NEGATIVE 25 NG/ML
NORDIAZEPAM, URINE (RUSH): NEGATIVE 25 NG/ML
NORFENTANYL OXALATE, URINE (RUSH): NEGATIVE 5 NG/ML
NORHYDROCODONE, URINE (RUSH): NEGATIVE 50 NG/ML
NOROXYCODONE HCL, URINE (RUSH): NEGATIVE 50 NG/ML
OXAZEPAM, URINE (RUSH): NEGATIVE 25 NG/ML
OXYCODONE UR QL SCN: NEGATIVE 25 NG/ML
OXYMORPHONE, URINE (RUSH): NEGATIVE 25 NG/ML
PH UR STRIP: 6 PH UNITS
SP GR UR STRIP: 1.02
TAPENTADOL, URINE (RUSH): NEGATIVE 25 NG/ML
TEMAZEPAM, URINE (RUSH): NEGATIVE 25 NG/ML
THC-COOH, URINE (RUSH): NEGATIVE 25 NG/ML
TRAMADOL, URINE (RUSH): NEGATIVE 100 NG/ML

## 2022-07-18 ENCOUNTER — TELEPHONE (OUTPATIENT)
Dept: PAIN MEDICINE | Facility: CLINIC | Age: 72
End: 2022-07-18
Payer: MEDICARE

## 2022-07-18 ENCOUNTER — CLINICAL SUPPORT (OUTPATIENT)
Dept: REHABILITATION | Facility: HOSPITAL | Age: 72
End: 2022-07-18
Payer: MEDICARE

## 2022-07-18 DIAGNOSIS — M54.50 LOW BACK PAIN, UNSPECIFIED BACK PAIN LATERALITY, UNSPECIFIED CHRONICITY, UNSPECIFIED WHETHER SCIATICA PRESENT: Primary | ICD-10-CM

## 2022-07-18 PROCEDURE — 97110 THERAPEUTIC EXERCISES: CPT | Mod: CQ

## 2022-07-18 PROCEDURE — 97140 MANUAL THERAPY 1/> REGIONS: CPT | Mod: CQ

## 2022-07-18 NOTE — PROGRESS NOTES
"  Physical Therapy Treatment Note     Name: Francis Parr  Clinic Number: [id number]    Therapy Diagnosis:   Encounter Diagnosis   Name Primary?    Low back pain, unspecified back pain laterality, unspecified chronicity, unspecified whether sciatica present Yes     Physician: Tony Clark MD    Visit Date: 7/18/2022    Physician Orders: PT Eval and Treat   Medical Diagnosis from Referral: Lumbar radiculopathy   Evaluation Date: 6/21/2022  Authorization Period Expiration: Lumbar radiculopathy   Plan of Care Expiration: 7/22/2022   Updated Plan of Care Due: N/A  Visit # / Visits authorized: 6/9   PTA Visit #: 5    Time In: 1016  Time Out: 1101  Total Billable Time: 45 minutes    Precautions: Standard and Fall    Subjective     Pt reports: she has most difficulty with coming from sit to stand due to pain from lumbar back  She was compliant with home exercise program.    Pain: 6/10  Location: bilateral back    Objective     Francis received therapeutic exercises to develop strength for 30 minutes including:  · NuStep x 6 min  · Scap retract 3 x 10 (not today)  · Post pelvic tilt 3 x 10  · Supine marching 3 x 10  · HL hip abduction GTB 3 x 10  · Bridging 3 x 10 (not today secondary to unable to perform because of pain)  · DKTC 2 x 10 with theraball  · LKR x 10 x 5 sh ea direction  · Seated forward therball rollouts x 10 (not today)  · Standing open books 2 x 10  · Thoracic extension with theraball 3 x 10  · TB shoulder rows with green band 3 x 10 (not today)  · Chair squats 3 x 10 (not today)    Francis received the following manual therapy techniques: Manual traction and stretching were applied to the: lumbar back for 15 minutes, including:  Lumbar traction using chair with intermittent holds 30" on/ 30" off  Piriformis/Cat stretch and HS stretch both LE    Home Exercises Provided and Patient Education Provided     Education provided: no new exercises added today     Written Home Exercises Provided: Patient " instructed to cont prior HEP.  Exercises were reviewed and Francis was able to demonstrate them prior to the end of the session.  Francis demonstrated good  understanding of the education provided.     See EMR under Patient Instructions for exercises provided prior visit.    Assessment     Patient stated her back felt better after treatment with no increase in pain or complaint. Patient still with most difficulty coming from sit to stand with lumbar back catching. Patient with improved pain and ability to perform sit to stand after lumbar traction.    Francis Is progressing well towards her goals.   Pt prognosis is Good.     Pt will continue to benefit from skilled outpatient physical therapy to address the deficits listed in the problem list box on initial evaluation, provide pt/family education and to maximize pt's level of independence in the home and community environment.     Pt's spiritual, cultural and educational needs considered and pt agreeable to plan of care and goals.     Anticipated barriers to physical therapy: Prior lumbar surgery, chronic pain, pain with prom    Goals:    SHORT TERM GOALS: 2 weeks     1. Recent signs and systems trend is improving in order to progress towards LTG's.     2. Patient will be independent with HEP in order to further progress and return to maximal function.     3. Patient will improve AROM to 50% of stated goals, listed in objective measures above, in order to progress towards independence with functional activities.      4. Pain rating at Worst: 5/10 in order to progress towards increased independence with activity.     5. Patient will be able to correct postural deviations in sitting and standing, to decrease pain and promote postural awareness for injury prevention.         LONG TERM GOALS: 4 weeks     1. Patient will return to normal ADL, recreational, and work related activities with less pain and limitation.      2. Patient will improve AROM to stated goals in  order to return to maximal functional potential.      3. Patient will improve Strength to stated goals of appropriate musculature in order to improve functional independence.      4. Pain Rating at Best: 1/10 to improve Quality of Life.               5. Patient will meet predicted functional outcome (FOTO) score: 10% to increase self-worth & perceived functional ability.         Plan     Continue with appropriate POC     Lucho Hernandez, [place]  7/18/2022

## 2022-07-18 NOTE — TELEPHONE ENCOUNTER
Pt states she had a little cut on her hand from the  and a little burn from the curling iron.  Pt denies any are being infected.  Pt informed it any area becomes red, swollen or drainage to let us know, voiced understanding.

## 2022-07-18 NOTE — TELEPHONE ENCOUNTER
----- Message from Laura Chapni sent at 7/18/2022  8:15 AM CDT -----  Regarding: any cuts  Pt states she is having procedure Thursday, Dr. Delgaod told her to inform her of any cuts or burns ASAP. States she cut her right thumb and burned her right wrist. Wants a callback

## 2022-07-21 ENCOUNTER — HOSPITAL ENCOUNTER (OUTPATIENT)
Facility: HOSPITAL | Age: 72
Discharge: HOME | End: 2022-07-21
Attending: PAIN MEDICINE | Admitting: PAIN MEDICINE
Payer: MEDICARE

## 2022-07-21 VITALS
HEART RATE: 77 BPM | SYSTOLIC BLOOD PRESSURE: 141 MMHG | TEMPERATURE: 98 F | RESPIRATION RATE: 15 BRPM | DIASTOLIC BLOOD PRESSURE: 65 MMHG | BODY MASS INDEX: 27.05 KG/M2 | OXYGEN SATURATION: 98 % | WEIGHT: 147 LBS | HEIGHT: 62 IN

## 2022-07-21 DIAGNOSIS — M54.16 LUMBAR RADICULOPATHY, CHRONIC: ICD-10-CM

## 2022-07-21 PROCEDURE — 62323 NJX INTERLAMINAR LMBR/SAC: CPT | Performed by: PAIN MEDICINE

## 2022-07-21 PROCEDURE — 62323 NJX INTERLAMINAR LMBR/SAC: CPT | Mod: ,,, | Performed by: PAIN MEDICINE

## 2022-07-21 PROCEDURE — 62323 PR INJ LUMBAR/SACRAL, W/IMAGING GUIDANCE: ICD-10-PCS | Mod: ,,, | Performed by: PAIN MEDICINE

## 2022-07-21 PROCEDURE — 27201423 OPTIME MED/SURG SUP & DEVICES STERILE SUPPLY: Performed by: PAIN MEDICINE

## 2022-07-21 PROCEDURE — 63600175 PHARM REV CODE 636 W HCPCS: Performed by: PAIN MEDICINE

## 2022-07-21 PROCEDURE — 25500020 PHARM REV CODE 255: Performed by: PAIN MEDICINE

## 2022-07-21 RX ORDER — TRIAMCINOLONE ACETONIDE 40 MG/ML
INJECTION, SUSPENSION INTRA-ARTICULAR; INTRAMUSCULAR
Status: DISCONTINUED | OUTPATIENT
Start: 2022-07-21 | End: 2022-07-21 | Stop reason: HOSPADM

## 2022-07-21 RX ORDER — SODIUM CHLORIDE 9 MG/ML
INJECTION, SOLUTION INTRAVENOUS CONTINUOUS
Status: DISCONTINUED | OUTPATIENT
Start: 2022-07-21 | End: 2022-07-21 | Stop reason: HOSPADM

## 2022-07-21 RX ORDER — IOPAMIDOL 612 MG/ML
INJECTION, SOLUTION INTRATHECAL
Status: DISCONTINUED | OUTPATIENT
Start: 2022-07-21 | End: 2022-07-21 | Stop reason: HOSPADM

## 2022-07-21 NOTE — PLAN OF CARE
Plan:  D/c pt at 1600  Informed pt if does not void in 8 hours to go to ER. Notify if redness, drainage, from injection site or fever over next 3-4 days. Rest and drink plenty of fluids for the remainder of the day. No lifting over 5 lbs. For the remainder of the day. Continue regular medications as prescribed. May take pain medications as prescribed.     Pain improved 100%

## 2022-07-21 NOTE — BRIEF OP NOTE
Discharge Note  Short Stay    Admit Date: 7/21/2022    Discharge Date: 7/21/2022    Attending Physician: Esther Delgado     Discharge Provider: Esther Delgado    Diagnoses:  Lumbar radiculopathy    Discharged Condition: Good    Final Diagnoses: Low back pain, unspecified back pain laterality, unspecified chronicity, unspecified whether sciatica present [M54.50]    Disposition: Home or Self Care    Hospital Course: No complications, uneventful    Outcome of Hospitalization, Treatment, Procedure, or Surgery:  Patient was admitted for outpatient interventional pain management procedure. The patient tolerated the procedure well with no complications.    Follow up/Patient Instructions:  Follow up as scheduled in Pain Management office in 3-4 weeks.  Patient has received instructions and follow up date and time.    Medications:  Continue previous medications

## 2022-07-21 NOTE — DISCHARGE INSTRUCTIONS
Continue diet as tolerated. Drink plenty of fluids and rest.   If unable to void in 8 hours proceed to nearest ER.   Notify MD of redness or drainage from incision site as well as any fever over the next 3-4 days.  No lifting over 5 lbs for the next 24 hrs.   Continue medications as prescribed. May take pain medication as prescribed.   May shower tomorrow. No tub baths for 48 hours following procedure.   May remove bandaids tomorrow, if they fall off before tomorrow they do not have to be replaced.

## 2022-07-21 NOTE — DISCHARGE SUMMARY
[facility] - Pain Management  Discharge Note  Short Stay    Procedure(s) (LRB):  Injection-steroid-epidural-lumbar  L3-L4  CHRIS  NO SEDATION (N/A)    OUTCOME: Patient tolerated treatment/procedure well without complication and is now ready for discharge.    DISPOSITION: Home or Self Care    FINAL DIAGNOSIS:  Lumbar radiculopathy    FOLLOWUP: In clinic    DISCHARGE INSTRUCTIONS:  See nurse's notes     TIME SPENT ON DISCHARGE: 5 minutes

## 2022-07-21 NOTE — OP NOTE
"Procedure Note    Procedure Date: 7/21/2022    Procedure Performed:  Lumbar interlaminar epidural steroid injection under fluoroscopy at L3-4    Indications: Patient failed conservative therapy.      Pre-op diagnosis: Lumbar Radiculopathy    Post-op diagnosis: same    Physician: Esther Delgado MD    Anesthesia: Local    Medications injected: Kenalog 40mg,  2 mL sterile preservative-free normal saline.    Local anesthetic used: 1% Lidocaine, 5 ml    Estimated Blood Loss: None    Complications:  NOne    Technique:  The patient was interviewed in the holding area and Risks/Benefits were discussed, including, but not limited to, the possibility of new or different pain, bleeding or infection.   All questions were answered.  The patient understood and accepted risks.  Consent was verified and signed.   A time-out was taken to identify patient and procedure prior to starting the procedure. The patient was placed in the prone position on the fluoroscopy table. The area of the lumbar spine was prepped with Chloraprep and draped in a sterile manner. The L3-4  interspace was identified and marked under AP fluoroscopy. The skin and subcutaneous tissues overlying the targeted interspace were anesthetized with 3-5 mL of 1% lidocaine using a 25G 1.5" needle.  A 20G  3.5" Tuohy epidural needle was directed toward the interspace under fluoroscopic guidance until the ligamentum flavum was engaged. From this point, a loss of resistance technique with a pulsator syringe a was used to identify entrance of the needle into the epidural space. Once loss of resistance was observed 3mL of Isovue contrast solution was injected. An appropriate epidurogram was noted.  A 3mL mixture consisting of saline and 40 mg of kenalog was injected slowly and without resistance.  The needle was  removed and a sterile Band-Aid dressing was applied to the puncture site.  The patient tolerated the procedure well and was transferred to the .AC. in stable " condition.  The patient was monitored after the procedure and was given post-procedure and discharge instructions to follow at home. The patient was discharged in a stable condition and accompanied by an adult .    Epidurogram:5 mL allotment of Isovue M 300 contrast revealed excellent delineation from L2-4. There were no filling defects or obstruction to dye flow noted.  There was no  intravascular or intrathecal spread noted with dye flow.

## 2022-08-03 ENCOUNTER — OFFICE VISIT (OUTPATIENT)
Dept: PAIN MEDICINE | Facility: CLINIC | Age: 72
End: 2022-08-03
Payer: MEDICARE

## 2022-08-03 VITALS
HEART RATE: 76 BPM | DIASTOLIC BLOOD PRESSURE: 55 MMHG | BODY MASS INDEX: 26.75 KG/M2 | HEIGHT: 62 IN | WEIGHT: 145.38 LBS | SYSTOLIC BLOOD PRESSURE: 142 MMHG

## 2022-08-03 DIAGNOSIS — M25.551 HIP PAIN, ACUTE, RIGHT: Primary | ICD-10-CM

## 2022-08-03 DIAGNOSIS — M54.16 LUMBAR RADICULOPATHY, CHRONIC: Chronic | ICD-10-CM

## 2022-08-03 DIAGNOSIS — M25.559 PAIN IN UNSPECIFIED HIP: ICD-10-CM

## 2022-08-03 PROCEDURE — 99213 PR OFFICE/OUTPT VISIT, EST, LEVL III, 20-29 MIN: ICD-10-PCS | Mod: S$PBB,,, | Performed by: PAIN MEDICINE

## 2022-08-03 PROCEDURE — 99215 OFFICE O/P EST HI 40 MIN: CPT | Mod: PBBFAC | Performed by: PAIN MEDICINE

## 2022-08-03 PROCEDURE — 99213 OFFICE O/P EST LOW 20 MIN: CPT | Mod: S$PBB,,, | Performed by: PAIN MEDICINE

## 2022-08-03 NOTE — PROGRESS NOTES
She Disclaimer: This note has been generated using voice-recognition software. There may be typographical errors that have been missed during proof-reading        Patient ID: Lynda Parr is a 71 y.o. female.      Chief Complaint: Hip Pain      71-year-old female returns for re-evaluation following L4-5 epidural steroid injection, July 21, 2024. She experienced 100% pain relief and was  able to engage in 3 rounds of golf.  Five days ago, she developed an acute onset of right hip pain.  The pain is very similar to the left hip pain  that she experienced with a gluteus medius muscle  tear in 2019, requiring surgery by  Dr. Alvarado in [facility].  The pain is now localized to the right hip and buttock.  She is unable to stand or walk without assistance of a crutch.  Weightbearing is nearly intolerable.  I will order an MRI to rule out gluteus medius muscle  tear.              Pain Assessment  Pain Score:   9  Pain Location: Hip  Pain Orientation: Right  Pain Descriptors: Stabbing, Constant  Pain Frequency: Continuous  Pain Onset: Awakened from sleep  Clinical Progression:  (new problem)  Aggravating Factors: Standing, Walking, Other (Comment) (sitting and lying)  Pain Intervention(s): Rest      A's of Opioid Risk Assessment  Activity:Patient is unable to  perform ADL.   Analgesia: None  Adverse Effects: Patient denies constipation or sedation.  Aberrant Behavior:  reviewed with no aberrant drug seeking/taking behavior.      Patient denies any suicidal or homicidal ideations    Physical Therapy/Home Exercise: yes      FL Fluoro for Pain Management  See OP Notes for results.     IMPRESSION: See OP Notes for results.     This procedure was auto-finalized by: Virtual Radiologist      Review of Systems   Constitutional: Negative.    HENT: Negative.    Eyes: Negative.    Respiratory: Negative.    Cardiovascular: Negative.    Gastrointestinal: Negative.    Endocrine: Negative.    Genitourinary: Negative.     Musculoskeletal: Positive for arthralgias (right hip) and myalgias.   Integumentary:  Negative.   Neurological: Negative.    Hematological: Negative.    Psychiatric/Behavioral: Negative.              Past Medical History:   Diagnosis Date    Benign essential HTN     Carpal tunnel syndrome, bilateral upper limbs     Chronic low back pain     Degeneration, intervertebral disc, lumbosacral     GERD (gastroesophageal reflux disease)     Hypercholesterolemia     Insomnia     Iron deficiency anemia     Localized swelling, mass and lump, right lower limb     Lower extremity edema     Lumbar spondylosis     Prediabetes     Restless leg     Slow transit constipation     Spinal stenosis, lumbar     Vitamin D deficiency      Past Surgical History:   Procedure Laterality Date    CATARACT EXTRACTION Left 09/10/2020    CATARACT EXTRACTION Right 10/01/2020    Dr. Stephen Fox    CHOLECYSTECTOMY  1990    COSMETIC SURGERY      eyelid    EPIDURAL STEROID INJECTION INTO LUMBAR SPINE N/A 7/21/2022    Procedure: Injection-steroid-epidural-lumbar  L3-L4  CHRIS  NO SEDATION;  Surgeon: Esther Delgado MD;  Location: [facility];  Service: Pain Management;  Laterality: N/A;  STATES HAD VAC  WILL BRING CARD    HIP ARTHROPLASTY Left 10/2015    [facility]    SHOULDER ARTHROSCOPY W/ ROTATOR CUFF REPAIR  2005    SINUS SURGERY  02/2011    TUBAL LIGATION      VASCULAR SURGERY  12/15/2017    R [facility] Laser Ablation per Dr. Herman Bullard     Social History     Socioeconomic History    Marital status:     Number of children: 1   Tobacco Use    Smoking status: Never Smoker    Smokeless tobacco: Never Used   Substance and Sexual Activity    Alcohol use: Yes    Drug use: Never    Sexual activity: Yes     Family History   Problem Relation Age of Onset    Hypertension Mother     Coronary artery disease Father     Pulmonary  embolism Brother     No Known Problems Son      Review of patient's allergies indicates:   Allergen Reactions    Ace inhibitors Other (See Comments)     has a current medication list which includes the following prescription(s): aspirin, ezetimibe, ferrous sulfate, losartan, magnesium oxide, metformin, multivitamin, omeprazole, pramipexole, pyridoxine (vitamin b6), vitamin d, zinc gluconate, and gabapentin.      Objective:  Vitals:    08/03/22 0929   BP: (!) 142/55   Pulse: 76        Physical Exam  Vitals and nursing note reviewed.   Constitutional:       General: She is not in acute distress.     Appearance: Normal appearance. She is not ill-appearing, toxic-appearing or diaphoretic.   HENT:      Head: Normocephalic and atraumatic.      Nose: Nose normal.      Mouth/Throat:      Mouth: Mucous membranes are moist.   Eyes:      Extraocular Movements: Extraocular movements intact.      Pupils: Pupils are equal, round, and reactive to light.   Cardiovascular:      Rate and Rhythm: Normal rate and regular rhythm.      Heart sounds: Normal heart sounds.   Pulmonary:      Effort: Pulmonary effort is normal. No respiratory distress.      Breath sounds: Normal breath sounds. No stridor. No wheezing or rhonchi.   Abdominal:      General: Bowel sounds are normal.      Palpations: Abdomen is soft.   Musculoskeletal:         General: No swelling or deformity.      Cervical back: Normal and normal range of motion. No spasms or tenderness. No pain with movement. Normal range of motion.      Thoracic back: Normal.      Lumbar back: No spasms, tenderness or bony tenderness. Normal range of motion. Negative right straight leg raise test and negative left straight leg raise test. No scoliosis.      Right hip: Tenderness and bony tenderness present. Decreased range of motion. Decreased strength.      Right lower leg: No edema.      Left lower leg: No edema.   Skin:     General: Skin is warm.   Neurological:      General: No focal  deficit present.      Mental Status: She is alert and oriented to person, place, and time. Mental status is at baseline.      Cranial Nerves: No cranial nerve deficit.      Sensory: Sensation is intact. No sensory deficit.      Motor: No weakness.      Coordination: Coordination normal.      Gait: Gait abnormal (Unable to weightbear and uses a crutch with ambulation and standing).      Deep Tendon Reflexes: Reflexes are normal and symmetric.   Psychiatric:         Mood and Affect: Mood normal.         Behavior: Behavior normal.           Assessment:      1. Hip pain, acute, right    2. Pain in unspecified hip    3. Lumbar radiculopathy, chronic          Plan:  1. reviewed  2.Addiction, Dependency, Tolerance, Opioid abuse-misuse, Death, Diversion Discussed. Overdose reversal drug Naloxone discussed.  3.Refill/Continue medications for pain control and function       Requested Prescriptions      No prescriptions requested or ordered in this encounter     4. Obtain MRI of the right hip for possible tendon tear tear  Orders Placed This Encounter   Procedures    MRI Hip With Contrast Right     Standing Status:   Future     Standing Expiration Date:   8/3/2023     Order Specific Question:   Does the patient have a pacemaker or a defibrilator (Note: Some facilities may not be able to schedule an MRI for patients with pacemakers and defibrillators. You should contact your local radiology department to determine if this is the case.)?     Answer:   No     Order Specific Question:   Does the patient have an aneurysm or surgical clip, pump, nerve/brain stimulator, middle/inner ear prosthesis, or other metal implant or foreign object (bullet, shrapnel)? If they have a card related to their implant, ask them to bring it. Issues related to the implant may cause the MRI to be delayed.     Answer:   No     Order Specific Question:   Is the patient claustrophobic?     Answer:   No     Order Specific Question:   Will the patient  require sedation?     Answer:   No     Order Specific Question:   Does the patient have any of the following conditions? Diabetes, History of Renal Disease or Hypertension requiring medical therapy?     Answer:   Yes     Order Specific Question:   May the Radiologist modify the order per protocol to meet the clinical needs of the patient?     Answer:   Yes     Order Specific Question:   Is this part of a Research Study?     Answer:   No     Order Specific Question:   Does the patient have on a skin patch for medication with aluminized backing?     Answer:   No      5. Return after MRI for re-evaluation     report:  Reviewed and consistent with medication use as prescribed.      The total time spent for evaluation and management on 08/03/2022 including reviewing separately obtained history, performing a medically appropriate exam and evaluation, documenting clinical information in the health record, independently interpreting results and communicating them to the patient/family/caregiver, and ordering medications/tests/procedures was between 15-29 minutes.    The above plan and management options were discussed at length with patient. Patient is in agreement with the above and verbalized understanding. It will be communicated with the referring physician via electronic record, fax, or mail.

## 2022-08-07 ENCOUNTER — PATIENT MESSAGE (OUTPATIENT)
Dept: ADMINISTRATIVE | Facility: OTHER | Age: 72
End: 2022-08-07

## 2022-08-09 ENCOUNTER — OFFICE VISIT (OUTPATIENT)
Dept: PAIN MEDICINE | Facility: CLINIC | Age: 72
End: 2022-08-09
Payer: MEDICARE

## 2022-08-09 VITALS
SYSTOLIC BLOOD PRESSURE: 150 MMHG | BODY MASS INDEX: 26.68 KG/M2 | WEIGHT: 145 LBS | HEART RATE: 76 BPM | DIASTOLIC BLOOD PRESSURE: 67 MMHG | HEIGHT: 62 IN

## 2022-08-09 DIAGNOSIS — Z98.1 HISTORY OF LUMBAR SPINAL FUSION: Chronic | ICD-10-CM

## 2022-08-09 DIAGNOSIS — M25.551 HIP PAIN, ACUTE, RIGHT: Primary | ICD-10-CM

## 2022-08-09 PROCEDURE — 99214 OFFICE O/P EST MOD 30 MIN: CPT | Mod: PBBFAC | Performed by: PAIN MEDICINE

## 2022-08-09 PROCEDURE — 99212 OFFICE O/P EST SF 10 MIN: CPT | Mod: S$PBB,,, | Performed by: PAIN MEDICINE

## 2022-08-09 PROCEDURE — 99212 PR OFFICE/OUTPT VISIT, EST, LEVL II, 10-19 MIN: ICD-10-PCS | Mod: S$PBB,,, | Performed by: PAIN MEDICINE

## 2022-08-09 NOTE — PATIENT INSTRUCTIONS
Referral to be sent to Dr Elton Kent at [facility] in [facility]  #[phone]  message sent to Laura    8-, message left on pt voicemail of appt with Dr Kent on 8- at 1230 as verified with Dr Kent office.

## 2022-08-09 NOTE — PROGRESS NOTES
She Disclaimer: This note has been generated using voice-recognition software. There may be typographical errors that have been missed during proof-reading        Patient ID: Lynda Parr is a 71 y.o. female.      Chief Complaint: Hip Pain (Right)      71 year old female returns for re-evaluation following MRI of the right hip.  She notes that her pain is most severe with certain movements and ambulation.  She received am surgical repair of a left  gluteus minimus tear in  [place] at [facility] by Dr. Kent in 2019. She is requesting a referral for re-evaluation for intractable right hip pain and current MRI findings findings. Her  lower back pain has resolved  since the lumbar epidural steroid injection.  She denies  radicular symptoms from the lower back to the lower extremities.            Pain Assessment  Pain Score:   5  Pain Location: Hip  Pain Orientation: Right  Pain Descriptors: Sharp, Pressure, Constant  Pain Frequency: Continuous  Pain Onset: Awakened from sleep  Clinical Progression: Not changed  Aggravating Factors: Other (Comment) (position change)  Pain Intervention(s): Rest      A's of Opioid Risk Assessment  Activity:Patient can barely perform ADL.   Analgesia: None   Adverse Effects: Patient denies constipation or sedation.  Aberrant Behavior:  reviewed with no aberrant drug seeking/taking behavior.      Patient denies any suicidal or homicidal ideations    Physical Therapy/Home Exercise: yes      MRI Hip Without Contrast Right  Narrative: EXAMINATION:  MRI HIP WITHOUT CONTRAST RIGHT    CLINICAL HISTORY:  right hip pain;Pain in right hip    TECHNIQUE:  MRI HIP WITHOUT CONTRAST RIGHT    COMPARISON:  2019    FINDINGS:  There is poor visibility of the right-sided gluteus minimus tendon, series 401, image 9.  Fluid adjacent to the gluteus minimus muscle.    The other flexor and extensor muscles are normal.  Mild osteoarthrosis of the hips.    No labral tear.  Impression: Findings  suggesting injury to the musculotendinous junction of the right-sided gluteus minimus muscle.    Mild osteoarthrosis of the hips.    No labral tear.    Electronically signed by: Kodak Liu  Date:    08/07/2022  Time:    10:06      Review of Systems   Constitutional: Negative.    HENT: Negative.    Eyes: Negative.    Respiratory: Negative.    Cardiovascular: Negative.    Gastrointestinal: Negative.    Endocrine: Negative.    Genitourinary: Negative.    Musculoskeletal: Positive for arthralgias (Right hip) and gait problem.   Integumentary:  Negative.   Hematological: Negative.    Psychiatric/Behavioral: Negative.              Past Medical History:   Diagnosis Date    Benign essential HTN     Carpal tunnel syndrome, bilateral upper limbs     Chronic low back pain     Degeneration, intervertebral disc, lumbosacral     GERD (gastroesophageal reflux disease)     Hypercholesterolemia     Insomnia     Iron deficiency anemia     Localized swelling, mass and lump, right lower limb     Lower extremity edema     Lumbar spondylosis     Prediabetes     Restless leg     Slow transit constipation     Spinal stenosis, lumbar     Vitamin D deficiency      Past Surgical History:   Procedure Laterality Date    CATARACT EXTRACTION Left 09/10/2020    CATARACT EXTRACTION Right 10/01/2020    Dr. Stephen Fox    CHOLECYSTECTOMY  1990    COSMETIC SURGERY      eyelid    EPIDURAL STEROID INJECTION INTO LUMBAR SPINE N/A 7/21/2022    Procedure: Injection-steroid-epidural-lumbar  L3-L4  CHRIS  NO SEDATION;  Surgeon: Esther Delgado MD;  Location: [facility];  Service: Pain Management;  Laterality: N/A;  STATES HAD VAC  WILL BRING CARD    HIP ARTHROPLASTY Left 10/2015    repair of torn gluteus medius muscle via hip arthroscopy at [facility] Dr. Yoli Lui    SHOULDER ARTHROSCOPY W/ ROTATOR CUFF REPAIR  2005    SINUS SURGERY  02/2011    TUBAL LIGATION      VASCULAR SURGERY  12/15/2017    R [facility] Laser  Ablation per Dr. Herman Bullard     Social History     Socioeconomic History    Marital status:     Number of children: 1   Tobacco Use    Smoking status: Never Smoker    Smokeless tobacco: Never Used   Substance and Sexual Activity    Alcohol use: Yes    Drug use: Never    Sexual activity: Yes     Family History   Problem Relation Age of Onset    Hypertension Mother     Coronary artery disease Father     Pulmonary embolism Brother     No Known Problems Son      Review of patient's allergies indicates:   Allergen Reactions    Ace inhibitors Other (See Comments)     has a current medication list which includes the following prescription(s): aspirin, ezetimibe, ferrous sulfate, losartan, magnesium oxide, metformin, multivitamin, omeprazole, pramipexole, pyridoxine (vitamin b6), vitamin d, zinc gluconate, and gabapentin.      Objective:  Vitals:    08/09/22 1300   BP: (!) 150/67   Pulse: 76        Physical Exam  Vitals and nursing note reviewed.   Constitutional:       General: She is not in acute distress.     Appearance: Normal appearance. She is not ill-appearing, toxic-appearing or diaphoretic.   HENT:      Head: Normocephalic and atraumatic.      Nose: Nose normal.      Mouth/Throat:      Mouth: Mucous membranes are moist.   Eyes:      Extraocular Movements: Extraocular movements intact.      Pupils: Pupils are equal, round, and reactive to light.   Cardiovascular:      Rate and Rhythm: Normal rate and regular rhythm.      Heart sounds: Normal heart sounds.   Pulmonary:      Effort: Pulmonary effort is normal. No respiratory distress.      Breath sounds: Normal breath sounds. No stridor. No wheezing or rhonchi.   Abdominal:      General: Bowel sounds are normal.      Palpations: Abdomen is soft.   Musculoskeletal:         General: No swelling or deformity.      Cervical back: Normal and normal range of motion. No spasms or tenderness. No pain with movement. Normal range of motion.      Thoracic  back: Normal.      Lumbar back: No spasms, tenderness or bony tenderness. Normal range of motion. Negative right straight leg raise test and negative left straight leg raise test. No scoliosis.      Right hip: Tenderness and bony tenderness present. Decreased range of motion. Decreased strength.      Right lower leg: No edema.      Left lower leg: No edema.   Skin:     General: Skin is warm.   Neurological:      General: No focal deficit present.      Mental Status: She is alert and oriented to person, place, and time. Mental status is at baseline.      Cranial Nerves: No cranial nerve deficit.      Sensory: Sensation is intact. No sensory deficit.      Motor: No weakness.      Coordination: Coordination normal.      Gait: Gait normal.      Deep Tendon Reflexes: Reflexes are normal and symmetric.   Psychiatric:         Mood and Affect: Mood normal.         Behavior: Behavior normal.           Assessment:      1. Hip pain, acute, right    2. History of lumbar spinal fusion          Plan:  1. Patient will follow-up as needed for re-evaluation  2. Patient will return to [facility] in [facility] with Dr. CARMEN hamilton for evaluation right gluteus minimus muscle tear.     report:  Reviewed and consistent with medication use as prescribed.      The total time spent for evaluation and management on 08/09/2022 including reviewing separately obtained history, performing a medically appropriate exam and evaluation, documenting clinical information in the health record, independently interpreting results and communicating them to the patient/family/caregiver, and ordering medications/tests/procedures was between 15-29 minutes.    The above plan and management options were discussed at length with patient. Patient is in agreement with the above and verbalized understanding. It will be communicated with the referring physician via electronic record, fax, or mail.

## 2022-08-12 ENCOUNTER — TELEPHONE (OUTPATIENT)
Dept: PAIN MEDICINE | Facility: CLINIC | Age: 72
End: 2022-08-12
Payer: MEDICARE

## 2022-08-12 NOTE — TELEPHONE ENCOUNTER
----- Message from Laura Chapin sent at 8/12/2022  8:09 AM CDT -----  I did it on the 9th but I did not speak to anyone, it made me leave a voicemail with their information.  ----- Message -----  From: Gail Guadalupe RN  Sent: 8/9/2022   1:25 PM CDT  To: Laura Chapin    Please send Referral to be sent to Dr Elton Kent at [facility] in [facility]  #[phone]  for right hip pain  MRI done

## 2022-09-08 DIAGNOSIS — M70.61 TROCHANTERIC BURSITIS OF RIGHT HIP: Primary | ICD-10-CM

## 2022-09-08 NOTE — PROGRESS NOTES
See PLAN OF CARE     Sup Visit performed today with HARRIS Mackey and HARRIS Castro.  All goals and treatment plan reviewed. Will work toward completion of all goals set.     gluteus medius repair 9/9/2022. Patient wearing hip brace to limit abduction/adduction. No active hip abduction for 8 weeks and no passive hip adduction. Patient is 50% Wb'ing using bilateral crutches for 4-6 weeks    See Protocol Media    Bike/NuStep    SB  Hamstring Stretch on Stairs - right    Supine:  QUAD SET   Glute set  STRAIGHT LEG RAISE   Heel slides  Knee flexion to 90 degrees    Seated:  Hip flexion  LONG ARC QUAD    Standing : right lower extremity   Standing at the Rail   Sit to Stand from Chair  Standing Marches with just right    Heel Raises - seated    Cybex Hip - right     Flexion   Extension     Cybex Leg Press - Bilateral     Cybex Hamstring Curls   Cybex Knee Extension

## 2022-09-12 ENCOUNTER — PATIENT MESSAGE (OUTPATIENT)
Dept: PAIN MEDICINE | Facility: CLINIC | Age: 72
End: 2022-09-12
Payer: MEDICARE

## 2022-09-12 ENCOUNTER — CLINICAL SUPPORT (OUTPATIENT)
Dept: REHABILITATION | Facility: HOSPITAL | Age: 72
End: 2022-09-12
Payer: MEDICARE

## 2022-09-12 ENCOUNTER — OFFICE VISIT (OUTPATIENT)
Dept: SPINE | Facility: CLINIC | Age: 72
End: 2022-09-12
Payer: MEDICARE

## 2022-09-12 DIAGNOSIS — M51.36 DDD (DEGENERATIVE DISC DISEASE), LUMBAR: ICD-10-CM

## 2022-09-12 DIAGNOSIS — M48.062 LUMBAR STENOSIS WITH NEUROGENIC CLAUDICATION: ICD-10-CM

## 2022-09-12 DIAGNOSIS — M70.61 TROCHANTERIC BURSITIS OF RIGHT HIP: ICD-10-CM

## 2022-09-12 DIAGNOSIS — M54.16 LUMBAR RADICULOPATHY: Primary | ICD-10-CM

## 2022-09-12 PROBLEM — M54.50 LOW BACK PAIN: Status: RESOLVED | Noted: 2022-06-21 | Resolved: 2022-09-12

## 2022-09-12 PROCEDURE — 97110 THERAPEUTIC EXERCISES: CPT

## 2022-09-12 PROCEDURE — 99214 PR OFFICE/OUTPT VISIT, EST, LEVL IV, 30-39 MIN: ICD-10-PCS | Mod: S$PBB,,, | Performed by: ORTHOPAEDIC SURGERY

## 2022-09-12 PROCEDURE — 97161 PT EVAL LOW COMPLEX 20 MIN: CPT

## 2022-09-12 PROCEDURE — 99214 OFFICE O/P EST MOD 30 MIN: CPT | Mod: S$PBB,,, | Performed by: ORTHOPAEDIC SURGERY

## 2022-09-12 PROCEDURE — 99213 OFFICE O/P EST LOW 20 MIN: CPT | Mod: PBBFAC | Performed by: ORTHOPAEDIC SURGERY

## 2022-09-12 NOTE — PLAN OF CARE
[facility]   Physical Therapy Initial Evaluation    Date: 9/12/2022   Name: Francis Parr  Clinic Number: [id number]    Therapy Diagnosis: Right gluteus medius repair  Physician: Elton Kent MD    Physician Orders: PT Eval and Treat right hip  Medical Diagnosis from Referral:  Right gluteus medius repair  Evaluation Date: 9/12/2022  Updated Plan of Care Due : 10/11/2022  Authorization Period Expiration: 9/8/2023  Plan of Care Expiration: 11/7/2022  Visit # / Visits authorized: 1/ 16    Time In: 8:55 am  Time Out: 10:00 am  Total Appointment Time (timed & untimed codes): 65 minutes    Precautions: Avoid abduction and adduction    Subjective   Date of onset: 9/9/2022  History of current condition - Francis reports: Patient had right gluteus medius tear and underwent surgery 9/9/2022  MD note states:     71 year old female returns for re-evaluation following MRI of the right hip.  She notes that her pain is most severe with certain movements and ambulation.  She received am surgical repair of a left  gluteus minimus tear in  [place] at [facility] by Dr. Kent in 2019. She is requesting a referral for re-evaluation for intractable right hip pain and current MRI findings findings. Her  lower back pain has resolved  since the lumbar epidural steroid injection.  She denies  radicular symptoms from the lower back to the lower extremities.       Medical History:   Past Medical History:   Diagnosis Date    Benign essential HTN     Carpal tunnel syndrome, bilateral upper limbs     Chronic low back pain     Degeneration, intervertebral disc, lumbosacral     GERD (gastroesophageal reflux disease)     Hypercholesterolemia     Insomnia     Iron deficiency anemia     Localized swelling, mass and lump, right lower limb     Lower extremity edema     Lumbar spondylosis     Prediabetes     Restless leg     Slow transit constipation     Spinal stenosis, lumbar     Vitamin D deficiency        Surgical  History:   Lynda Parr  has a past surgical history that includes Cataract extraction (Left, 09/10/2020); Cataract extraction (Right, 10/01/2020); Cholecystectomy (1990); Shoulder arthroscopy w/ rotator cuff repair (2005); Sinus surgery (02/2011); Tubal ligation; Vascular surgery (12/15/2017); Hip Arthroplasty (Left, 10/2015); Cosmetic surgery; and Epidural steroid injection into lumbar spine (N/A, 7/21/2022).    Medications:   Lynda has a current medication list which includes the following prescription(s): aspirin, ezetimibe, ferrous sulfate, gabapentin, losartan, magnesium oxide, metformin, multivitamin, omeprazole, pramipexole, pyridoxine (vitamin b6), vitamin d, and zinc gluconate.    Allergies:   Review of patient's allergies indicates:   Allergen Reactions    Ace inhibitors Other (See Comments)        Imaging:   MRI Hip Without Contrast Right  Narrative: EXAMINATION:  MRI HIP WITHOUT CONTRAST RIGHT     CLINICAL HISTORY:  right hip pain;Pain in right hip     TECHNIQUE:  MRI HIP WITHOUT CONTRAST RIGHT     COMPARISON:  2019     FINDINGS:  There is poor visibility of the right-sided gluteus minimus tendon, series 401, image 9.  Fluid adjacent to the gluteus minimus muscle.     The other flexor and extensor muscles are normal.  Mild osteoarthrosis of the hips.     No labral tear.  Impression: Findings suggesting injury to the musculotendinous junction of the right-sided gluteus minimus muscle.     Mild osteoarthrosis of the hips.     No labral tear.    Prior Therapy: therapy for LBP in July  Social History: lives with their spouse  Occupation: Retired Nurse Practitioner   Prior Level of Function: Patient was enjoying prison and fell going down stairs  Current Level of Function: Patient wearing hip brace and using bilateral crutches    Pain:  Current 3/10, worst 9/10, best 3/10   Location: right Hip     Description: Aching and Throbbing  Aggravating Factors: Standing and Walking  Easing Factors: lying  down    Patients goals: To decrease pain    Objective     Body Type: meso-endomorph     HIP OBSERVATION  Incision(s): covered and no signs of infection      Left Lower Extremity  ROM/Strength Right lower Extremity     AROM PROM STRENGTH  AROM PROM STRENGTH     4+/5 HIP     Flexion 80 105 3-/5     4+/5            Extension    3+/5     4+/5            Abduction    NT     4+/5            Adduction    NT                 Internal Rotation                     External Rotation         4+/5 KNEE Flexion (sitting) WFL  4-/5                 Flexion (prone)        4+/5            Extension  -5 WNL 3+/5         Clinical Special Tests:  A. Hip  Post-op right gluteus medius repair      Weight Bearing:  [] WEIGHT BEARING AS TOLERATED  [] PARTIAL WEIGHT BEARING  50%  [x] TOUCH TOE WEIGHT BEARING  [] NONWEIGHT BEARING     Transfers:  Modified Independent    Ambulation: 50% Wb'ing wearing hip brace and using bilateral crutches.     Stairs: Modified Independent    Other:      ANKLE OBSERVATION  Pronation: Mild            Limitation/Restriction for FOTO Hip Survey    Therapist reviewed FOTO scores for Francis Parr on 9/12/2022.   FOTO documents entered into "[employer] - see Media section.    Limitation Score: 64%         TREATMENT   Treatment Time In: 9:50  Treatment Time Out: 10:00 am  Total Treatment time (time-based codes) separate from Evaluation: 10 minutes      Francis received the treatments listed below:  THERAPEUTIC EXERCISES to develop strength for 10 minutes including STRAIGHT LEG RAISE, QUAD SETs, GS, AP's, SKTC, Bridging, Hamstring stretch.         Home Exercises and Patient Education Provided    Education provided:   - HOME EXERCISE PROGRAM     Written Home Exercises Provided: yes.  Exercises were reviewed and Francis was able to demonstrate them prior to the end of the session.  Francis demonstrated good  understanding of the education provided.     See EMR under Patient Instructions for exercises provided  9/12/2022.    Assessment   Lynda is a 71 y.o. female referred to outpatient Physical Therapy with a medical diagnosis of Trochanteric bursitis of right hip. Patient presents with right hip pain due to right endoscopic gluteus medius repair 9/9/2022. Patient wearing hip brace to limit abduction/adduction. No active hip abduction for 8 weeks and no passive hip adduction. Patient is 50% Wb'ing using bilateral crutches for 4-6 weeks. Patient's strength and RANGE OF MOTION is limited. Patient expressed good understanding of precautions.      Patient prognosis is Good.   Patientt will benefit from skilled outpatient Physical Therapy to address the deficits stated above and in the chart below, provide patient /family education, and to maximize patientt's level of independence.     Plan of care discussed with patient: Yes  Patient's spiritual, cultural and educational needs considered and patient is agreeable to the plan of care and goals as stated below:     Anticipated Barriers for therapy: None    Goals:  Short Term Goals: 4 weeks   1. Independent with Home Exercise Program   2. Increase Right Hip Range of Motion to Within Functional Limits   3. Increase Right Hip Strength to grossly 4/5  4. Patient will ambulate 500 feet with bilateral crutches with complaints of pain no grater than 2-3/10    Long Term Goals: 8 weeks   1. Patient will increase Right Hip Strength to grossly 4+/5  2. Patient will ambulate 1000+ feet with no complaints of pain or weakness in Right Hip     Plan   Plan of care Certification: 9/12/2022 to 11/7/2022.    Outpatient Physical Therapy 2 times weekly for 8 weeks to include the following interventions: Electrical Stimulation Pre-Mod, Gait Training, Manual Therapy, Moist Heat/ Ice, Neuromuscular Re-ed, Patient Education, Therapeutic Activities, Therapeutic Exercise, and Ultrasound.     REBEKA BERG, PT, MLT    9/12/2022      I CERTIFY THE NEED FOR THESE SERVICES FURNISHED UNDER THIS PLAN OF  TREATMENT AND WHILE UNDER MY CARE.    Physician's comments:      Physician's Signature: ___________________________________________________

## 2022-09-14 ENCOUNTER — CLINICAL SUPPORT (OUTPATIENT)
Dept: REHABILITATION | Facility: HOSPITAL | Age: 72
End: 2022-09-14
Payer: MEDICARE

## 2022-09-14 DIAGNOSIS — M70.61 TROCHANTERIC BURSITIS OF RIGHT HIP: Primary | ICD-10-CM

## 2022-09-14 PROCEDURE — 97110 THERAPEUTIC EXERCISES: CPT | Mod: CQ

## 2022-09-14 NOTE — PROGRESS NOTES
[facility]   Physical Therapy Treatment Note     Name: Francis Parr  Clinic Number: [id number]    Visit Date: 9/14/2022  Therapy Diagnosis: Right gluteus medius repair  Physician: Elton Kent MD     Physician Orders: PT Eval and Treat right hip  Medical Diagnosis from Referral:  Right gluteus medius repair  Evaluation Date: 9/12/2022  Updated Plan of Care Due : 10/11/2022  Authorization Period Expiration: 9/8/2023  Plan of Care Expiration: 11/7/2022    Visit # / Visits authorized: 1/ 16  PTA Visit #: 1/5     Time In: 11:26 am  Time Out: 12:10 pm  Total Billable Time: 44 minutes    SUBJECTIVE     Pt reports: she is doing good with minimal pain noted.  She was compliant with home exercise program.  Response to previous treatment: First since eval  Functional change: None    Pain: 2/10  Location: right groin      Prior Level of Function: Patient was enjoying group home and fell going down stairs  Current Level of Function: Patient wearing hip brace and using bilateral crutches       OBJECTIVE     Objective Measures updated at progress report unless specified.     Treatment     Francis received the treatments listed below:      therapeutic exercises to develop strength, endurance, and ROM for 44 minutes including:  Bike/NuStep - 5 minutes     SB  Hamstring Stretch on Stairs - right - 4x20 seconds     Supine:  QUAD SET - 30x  Glute set - 30x (partial bridge)  STRAIGHT LEG RAISE   Heel slides supine - 30x red band  Knee flexion to 90 degrees     Seated:  Hip Add with Ball - 30x3 seconds  Hip flexion  LONG ARC QUAD 2# 30x     Standing : right lower extremity   Standing at the Rail   Sit to Stand from Chair - 2x10  Standing Marches with just right 2# 20x     Heel Raises - 3x10     Cybex Hip - right     Flexion   Extension      Cybex Leg Press - Bilateral      Cybex Hamstring Curls   Cybex Knee Extension     manual therapy techniques: Joint mobilizations, Manual traction, and Myofacial  release were applied to the: hip for 0 minutes, including:      neuromuscular re-education activities to improve: Balance, Coordination, Kinesthetic, and Proprioception for 0 minutes. The following activities were included:      therapeutic activities to improve functional performance for 0  minutes, including:      gait training to improve functional mobility and safety for 0  minutes, including:        Patient Education and Home Exercises     Home Exercises Provided and Patient Education Provided     Education provided:   - AVS    Written Home Exercises Provided: Patient instructed to cont prior HEP. Exercises were reviewed and Francis was able to demonstrate them prior to the end of the session.  Francis demonstrated good  understanding of the education provided. See EMR under Patient Instructions for exercises provided during therapy sessions    ASSESSMENT     Pt tolerated all therapeutic exercises today with min. C/o fatigue noted. No pain noted during session. Adhered to protocol during tx. Educated pt to continue with her HEP diligently.        Francis is a 71 y.o. female referred to outpatient Physical Therapy with a medical diagnosis of Trochanteric bursitis of right hip. Patient presents with right hip pain due to right endoscopic gluteus medius repair 9/9/2022. Patient wearing hip brace to limit abduction/adduction. No active hip abduction for 8 weeks and no passive hip adduction. Patient is 50% Wb'ing using bilateral crutches for 4-6 weeks. Patient's strength and RANGE OF MOTION is limited. Patient expressed good understanding of precautions.       Francis Is progressing towards her goals.   Pt prognosis is Good.     Pt will continue to benefit from skilled outpatient physical therapy to address the deficits listed in the problem list box on initial evaluation, provide pt/family education and to maximize pt's level of independence in the home and community environment.     Pt's spiritual, cultural and  educational needs considered and pt agreeable to plan of care and goals.     Anticipated barriers to physical therapy: None    Goals:  Short Term Goals: 4 weeks   1. Independent with Home Exercise Program   2. Increase Right Hip Range of Motion to Within Functional Limits   3. Increase Right Hip Strength to grossly 4/5  4. Patient will ambulate 500 feet with bilateral crutches with complaints of pain no grater than 2-3/10     Long Term Goals: 8 weeks   1. Patient will increase Right Hip Strength to grossly 4+/5  2. Patient will ambulate 1000+ feet with no complaints of pain or weakness in Right Hip     PLAN     Plan of care Certification: 9/12/2022 to 11/7/2022.     Outpatient Physical Therapy 2 times weekly for 8 weeks to include the following interventions: Electrical Stimulation Pre-Mod, Gait Training, Manual Therapy, Moist Heat/ Ice, Neuromuscular Re-ed, Patient Education, Therapeutic Activities, Therapeutic Exercise, and Ultrasound.     Froilan Whitley, PTA   09/14/2022

## 2022-09-15 NOTE — PROGRESS NOTES
MDM/time:  Greater than 30 minutes spent on this encounter including 10 minutes reviewing imaging and notes, 15 minutes with the patient, 5 minutes documentation    ASSESSMENT:  71 y.o. female with lumbar spondylosis and radiculopathy, status post L4-5 TLIF October 2019, now with back pain around the thoracolumbar junction    PLAN:  Follow-up as needed    HPI:  71 y.o. female here for repeat evaluation of lumbar spondylosis and back pain.  She has had an injection with Dr. Delgado with good relief.  She had right hip pain and was diagnosed with a gluteus minimus tear which was fixed at [facility] in [place] with a hip scope on 09/09/2022.  Status post L4-5 TLIF October 2019. Reports that lately she has had worsening back pain as well as fatigue in her legs with prolonged standing walking and pain into the bilateral hips and buttocks.  Denies any recent injuries.      IMAGING:  X-rays lumbar spine reviewed show:  On the AP there is normal coronal alignment.  There are 5 non-rib-bearing lumbar vertebrae.  On the lateral there is maintained lumbar lordosis.  She is status post L4-5 laminectomy and fusion with interbody.  No signs of hardware loosening or failure.    MRI lumbar spine 05/19/2022 reviewed shows:  At L3-4 there is severe central bilateral lateral recess stenosis.  Moderate bilateral foraminal stenosis  At L4-5 there is prior TLIF with satisfactory decompression  At L5-S1 there is right paracentral disc protrusion with moderate right lateral recess stenosis and moderate bilateral foraminal stenosis, consistent with prior MRI    Past Medical History:   Diagnosis Date    Benign essential HTN     Carpal tunnel syndrome, bilateral upper limbs     Chronic low back pain     Degeneration, intervertebral disc, lumbosacral     GERD (gastroesophageal reflux disease)     Hypercholesterolemia     Insomnia     Iron deficiency anemia     Localized swelling, mass and lump, right lower limb     Lower extremity edema      Lumbar spondylosis     Prediabetes     Restless leg     Slow transit constipation     Spinal stenosis, lumbar     Vitamin D deficiency      Past Surgical History:   Procedure Laterality Date    CATARACT EXTRACTION Left 09/10/2020    CATARACT EXTRACTION Right 10/01/2020    Dr. Stephen Fox    CHOLECYSTECTOMY  1990    COSMETIC SURGERY      eyelid    EPIDURAL STEROID INJECTION INTO LUMBAR SPINE N/A 7/21/2022    Procedure: Injection-steroid-epidural-lumbar  L3-L4  CHRIS  NO SEDATION;  Surgeon: Esther Delgado MD;  Location: [facility];  Service: Pain Management;  Laterality: N/A;  STATES HAD VAC  WILL BRING CARD    HIP ARTHROPLASTY Left 10/2015    repair of torn gluteus medius muscle via hip arthroscopy at [facility], Dr. Kent    SHOULDER ARTHROSCOPY W/ ROTATOR CUFF REPAIR  2005    SINUS SURGERY  02/2011    TUBAL LIGATION      VASCULAR SURGERY  12/15/2017    [facility] Laser Ablation per Dr. Herman Bullard     Social History     Tobacco Use    Smoking status: Never    Smokeless tobacco: Never   Substance Use Topics    Alcohol use: Yes    Drug use: Never      Current Outpatient Medications   Medication Instructions    aspirin 81 mg, Oral, Daily    ezetimibe (ZETIA) 10 mg, Oral, Daily    ferrous sulfate (FEOSOL) 325 mg, Oral, With breakfast    gabapentin (NEURONTIN) 300 mg, Oral, 3 times daily    losartan (COZAAR) 25 mg, Oral, Daily    magnesium oxide 200 mg magnesium Tab 1 tablet, Oral, Daily    metFORMIN (GLUCOPHAGE) 500 mg, Oral, 3 times daily    multivitamin (THERAGRAN) per tablet 1 tablet, Oral, Daily    omeprazole (PRILOSEC) 20 mg, Oral, Daily    pramipexole (MIRAPEX) 0.5 mg, Oral, 3 times daily    pyridoxine (vitamin B6) (B-6) 50 mg, Oral, Daily    vitamin D (VITAMIN D3) 1,000 Units, Oral, Daily    zinc gluconate 50 mg, Oral, Daily        EXAM:  Constitutional  General Appearance:  There is no height or weight on file to calculate BMI., NAD  Psychiatric   Orientation: Oriented to time, oriented to  place, oriented to person  Mood and Affect: Active and alert, normal mood, normal affect  Gait and Station   Appearance:  Normal gait, normal tandem gait, able to walk on toes, able to walk on heels  Healed incision  5/5 strength  Sensation intact  2+ pulses

## 2022-09-20 ENCOUNTER — CLINICAL SUPPORT (OUTPATIENT)
Dept: REHABILITATION | Facility: HOSPITAL | Age: 72
End: 2022-09-20
Payer: MEDICARE

## 2022-09-20 DIAGNOSIS — M70.61 TROCHANTERIC BURSITIS OF RIGHT HIP: Primary | ICD-10-CM

## 2022-09-20 PROCEDURE — 97110 THERAPEUTIC EXERCISES: CPT | Mod: CQ

## 2022-09-20 NOTE — PROGRESS NOTES
[facility]   Physical Therapy Treatment Note      Name: Francis Parr  Clinic Number: [id number]     Visit Date: 9/20/2022  Therapy Diagnosis: Right gluteus medius repair  Physician: Elton Kent MD     Physician Orders: PT Eval and Treat right hip  Medical Diagnosis from Referral:  Right gluteus medius repair  Evaluation Date: 9/12/2022  Updated Plan of Care Due : 10/11/2022  Authorization Period Expiration: 9/8/2023  Plan of Care Expiration: 11/7/2022     Visit # / Visits authorized: 1/ 16  PTA Visit #: 1/5      Time In: 11:53 am  Time Out: 12:10 pm  Total Billable Time: 15 minutes     SUBJECTIVE      Pt reports: she is doing good with minimal pain noted.  She was compliant with home exercise program.  Response to previous treatment: First since eval  Functional change: None     Pain: 2/10  Location: right groin       Prior Level of Function: Patient was enjoying halfway and fell going down stairs  Current Level of Function: Patient wearing hip brace and using bilateral crutches        OBJECTIVE      Objective Measures updated at progress report unless specified.      Treatment      Francis received the treatments listed below:       therapeutic exercises to develop strength, endurance, and ROM for 15 minutes including:    Exercises in Red performed by Whitney De Paz PT, DPT     Bike/NuStep - 5 minutes  SB  Hamstring Stretch on Stairs - right - 4x20 seconds     Supine:  QUAD SET - 30x  Glute set - 30x (partial bridge)  STRAIGHT LEG RAISE   Heel slides supine - 30x red band  Knee flexion to 90 degrees     Seated:  Hip Add with Ball - 30x3 seconds  Hip flexion  LONG ARC QUAD 2# 30x     Standing : right lower extremity   Standing at the Rail   Sit to Stand from Chair - 2x10  Standing Marches with just right 2# 20x     Heel Raises - 3x10     Cybex Hip - right     Flexion   Extension      Cybex Leg Press - Bilateral      Cybex Hamstring Curls   Cybex Knee Extension      manual  therapy techniques: Joint mobilizations, Manual traction, and Myofacial release were applied to the: hip for 0 minutes, including:        neuromuscular re-education activities to improve: Balance, Coordination, Kinesthetic, and Proprioception for 0 minutes. The following activities were included:        therapeutic activities to improve functional performance for 0  minutes, including:        gait training to improve functional mobility and safety for 0  minutes, including:           Patient Education and Home Exercises      Home Exercises Provided and Patient Education Provided      Education provided:   - AVS     Written Home Exercises Provided: Patient instructed to cont prior HEP. Exercises were reviewed and Francis was able to demonstrate them prior to the end of the session.  Francis demonstrated good  understanding of the education provided. See EMR under Patient Instructions for exercises provided during therapy sessions     ASSESSMENT      Pt tolerated all therapeutic exercises today with min. C/o fatigue noted. No pain noted during session. Adhered to protocol during tx. Educated pt to continue with her HEP diligently.           Francis is a 71 y.o. female referred to outpatient Physical Therapy with a medical diagnosis of Trochanteric bursitis of right hip. Patient presents with right hip pain due to right endoscopic gluteus medius repair 9/9/2022. Patient wearing hip brace to limit abduction/adduction. No active hip abduction for 8 weeks and no passive hip adduction. Patient is 50% Wb'ing using bilateral crutches for 4-6 weeks. Patient's strength and RANGE OF MOTION is limited. Patient expressed good understanding of precautions.        Francis Is progressing towards her goals.   Pt prognosis is Good.      Pt will continue to benefit from skilled outpatient physical therapy to address the deficits listed in the problem list box on initial evaluation, provide pt/family education and to maximize pt's level  of independence in the home and community environment.      Pt's spiritual, cultural and educational needs considered and pt agreeable to plan of care and goals.     Anticipated barriers to physical therapy: None     Goals:  Short Term Goals: 4 weeks   1. Independent with Home Exercise Program   2. Increase Right Hip Range of Motion to Within Functional Limits   3. Increase Right Hip Strength to grossly 4/5  4. Patient will ambulate 500 feet with bilateral crutches with complaints of pain no grater than 2-3/10     Long Term Goals: 8 weeks   1. Patient will increase Right Hip Strength to grossly 4+/5  2. Patient will ambulate 1000+ feet with no complaints of pain or weakness in Right Hip      PLAN      Plan of care Certification: 9/12/2022 to 11/7/2022.     Outpatient Physical Therapy 2 times weekly for 8 weeks to include the following interventions: Electrical Stimulation Pre-Mod, Gait Training, Manual Therapy, Moist Heat/ Ice, Neuromuscular Re-ed, Patient Education, Therapeutic Activities, Therapeutic Exercise, and Ultrasound.      Whitney De Paz, PT, DPT   09/20/2022

## 2022-09-20 NOTE — PROGRESS NOTES
[facility]   Physical Therapy Treatment Note     Name: Francis Parr  Clinic Number: [id number]    Visit Date: 9/20/2022  Therapy Diagnosis: Right gluteus medius repair  Physician: Elton Kent MD     Physician Orders: PT Eval and Treat right hip  Medical Diagnosis from Referral:  Right gluteus medius repair  Evaluation Date: 9/12/2022  Updated Plan of Care Due : 10/11/2022  Authorization Period Expiration: 9/8/2023  Plan of Care Expiration: 11/7/2022    Visit # / Visits authorized: 3/ 16  PTA Visit #: 2//5     Time In: 11:26 am  Time Out: 11:53 am  Total Billable Time: 27 minutes    SUBJECTIVE     Pt reports: she is doing good with minimal pain noted.  She was compliant with home exercise program.  Response to previous treatment: First since eval  Functional change: None    Pain: 2/10  Location: right groin      Prior Level of Function: Patient was enjoying longterm and fell going down stairs  Current Level of Function: Patient wearing hip brace and using bilateral crutches       OBJECTIVE     Objective Measures updated at progress report unless specified.     Treatment     Francis received the treatments listed below:      therapeutic exercises to develop strength, endurance, and ROM for 44 minutes including:    Bike/NuStep - 5 minutes  SB  Hamstring Stretch on Stairs - right - 4x20 seconds     Supine:  QUAD SET - 30x  Glute set - 30x (partial bridge)  STRAIGHT LEG RAISE   Heel slides supine - 30x red band  Knee flexion to 90 degrees      TKE - 3 plates 30x   Seated:  Hip Add with Ball - 30x3 seconds  Hip flexion  LONG ARC QUAD 2# 30x     Standing : right lower extremity   Standing at the Rail   Sit to Stand from Chair - 2x10  Standing Marches with just right 2# 20x     Heel Raises - 3x10     Cybex Hip - right     Flexion   Extension      Cybex Leg Press - Bilateral      Cybex Hamstring Curls   Cybex Knee Extension     manual therapy techniques: Joint mobilizations, Manual  traction, and Myofacial release were applied to the: hip for 0 minutes, including:      neuromuscular re-education activities to improve: Balance, Coordination, Kinesthetic, and Proprioception for 0 minutes. The following activities were included:      therapeutic activities to improve functional performance for 0  minutes, including:      gait training to improve functional mobility and safety for 0  minutes, including:        Patient Education and Home Exercises     Home Exercises Provided and Patient Education Provided     Education provided:   - AVS    Written Home Exercises Provided: Patient instructed to cont prior HEP. Exercises were reviewed and Francis was able to demonstrate them prior to the end of the session.  Francis demonstrated good  understanding of the education provided. See EMR under Patient Instructions for exercises provided during therapy sessions    ASSESSMENT     Pt tolerated all therapeutic exercises today with no pain noted. Fatigue noted with closed chain exercises. Pt has edema present on anterior joint line. Adhered to protocol today. Exercises in RED performed by Whitney De Paz DPT.         Francis is a 71 y.o. female referred to outpatient Physical Therapy with a medical diagnosis of Trochanteric bursitis of right hip. Patient presents with right hip pain due to right endoscopic gluteus medius repair 9/9/2022. Patient wearing hip brace to limit abduction/adduction. No active hip abduction for 8 weeks and no passive hip adduction. Patient is 50% Wb'ing using bilateral crutches for 4-6 weeks. Patient's strength and RANGE OF MOTION is limited. Patient expressed good understanding of precautions.       Francis Is progressing towards her goals.   Pt prognosis is Good.     Pt will continue to benefit from skilled outpatient physical therapy to address the deficits listed in the problem list box on initial evaluation, provide pt/family education and to maximize pt's level of independence in  the home and community environment.     Pt's spiritual, cultural and educational needs considered and pt agreeable to plan of care and goals.     Anticipated barriers to physical therapy: None    Goals:  Short Term Goals: 4 weeks   1. Independent with Home Exercise Program   2. Increase Right Hip Range of Motion to Within Functional Limits   3. Increase Right Hip Strength to grossly 4/5  4. Patient will ambulate 500 feet with bilateral crutches with complaints of pain no grater than 2-3/10     Long Term Goals: 8 weeks   1. Patient will increase Right Hip Strength to grossly 4+/5  2. Patient will ambulate 1000+ feet with no complaints of pain or weakness in Right Hip     PLAN     Plan of care Certification: 9/12/2022 to 11/7/2022.     Outpatient Physical Therapy 2 times weekly for 8 weeks to include the following interventions: Electrical Stimulation Pre-Mod, Gait Training, Manual Therapy, Moist Heat/ Ice, Neuromuscular Re-ed, Patient Education, Therapeutic Activities, Therapeutic Exercise, and Ultrasound.     Froilan Whitley, PTA   09/20/2022

## 2022-09-27 ENCOUNTER — CLINICAL SUPPORT (OUTPATIENT)
Dept: REHABILITATION | Facility: HOSPITAL | Age: 72
End: 2022-09-27
Payer: MEDICARE

## 2022-09-27 DIAGNOSIS — M70.61 TROCHANTERIC BURSITIS OF RIGHT HIP: Primary | ICD-10-CM

## 2022-09-27 PROCEDURE — 97110 THERAPEUTIC EXERCISES: CPT | Mod: CQ

## 2022-09-27 PROCEDURE — 97530 THERAPEUTIC ACTIVITIES: CPT | Mod: CQ

## 2022-09-27 PROCEDURE — 97112 NEUROMUSCULAR REEDUCATION: CPT | Mod: CQ

## 2022-09-27 NOTE — PROGRESS NOTES
[facility]   Physical Therapy Treatment Note      Name: Francis Parr  Clinic Number: [id number]     Visit Date: 9/20/2022  Therapy Diagnosis: Right gluteus medius repair  Physician: Elton Kent MD     Physician Orders: PT Eval and Treat right hip  Medical Diagnosis from Referral:  Right gluteus medius repair  Evaluation Date: 9/12/2022  Updated Plan of Care Due : 10/11/2022  Authorization Period Expiration: 9/8/2023  Plan of Care Expiration: 11/7/2022     Visit # / Visits authorized: 4/ 16  PTA Visit #: 1/5      Time In: 10:45 am  Time Out: 11:27 am  Total Billable Time: 42 minutes     SUBJECTIVE      Pt reports: she is doing good with minimal pain noted, just soreness. She saw MD 9/22 and he ordered her to continue PWB for 2 weeks and then wean off crutches. Pressure in the groin area, no pain like last time  She was compliant with home exercise program.  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      Treatment      Francis received the treatments listed below:       therapeutic exercises to develop strength, endurance, and ROM for 20 minutes including:     NuStep - 5 minutes  SB  Hamstring Stretch on Stairs - right - 4x20 seconds     Supine:  STRAIGHT LEG RAISE 20x LLE  Heel slides supine - 30x red band  Knee flexion to 90 degrees     Seated:  Hip Add with Ball - 30x3 seconds  Hip flexion    Cybex Hip - right     Flexion   Extension      Cybex Leg Press - Bilateral      Cybex Hamstring Curls   Cybex Knee Extension      manual therapy techniques: Joint mobilizations, Manual traction, and Myofacial release were applied to the: hip for 0 minutes, including:         neuromuscular re-education activities to improve: Balance, Coordination, Kinesthetic, and Proprioception for 10 minutes. The following activities were included:   LONG ARC QUAD 2# 30x eccentric focus   QUAD SET - 30x 2 second hold   Glute set - 20x (partial bridge)    therapeutic activities to improve functional performance for 10  minutes, including:   Sit to Stand from Chair - 2x10  Standing Marches with just right lower extremity at rail 2# 20x   Heel Raises - 3x10 at rail    gait training to improve functional mobility and safety for 0  minutes, including:           Patient Education and Home Exercises      Home Exercises Provided and Patient Education Provided      Education provided:   - AVS     Written Home Exercises Provided: Patient instructed to cont prior HEP. Exercises were reviewed and Francis was able to demonstrate them prior to the end of the session.  Francis demonstrated good  understanding of the education provided. See EMR under Patient Instructions for exercises provided during therapy sessions     ASSESSMENT      Pt tolerated all therapeutic exercises today with min. C/o fatigue noted. No pain noted during session. Adhered to protocol during tx. She saw MD on 9/22. Ordered to continue PWB for 2 more weeks and then wean off crutches as tolerated. Educated pt to continue with her HEP diligently.      PMH from evaluation:   Francis is a 71 y.o. female referred to outpatient Physical Therapy with a medical diagnosis of Trochanteric bursitis of right hip. Patient presents with right hip pain due to right endoscopic gluteus medius repair 9/9/2022. Patient wearing hip brace to limit abduction/adduction. No active hip abduction for 8 weeks and no passive hip adduction. Patient is 50% Wb'ing using bilateral crutches for 4-6 weeks. Patient's strength and RANGE OF MOTION is limited. Patient expressed good understanding of precautions.        Francis Is progressing towards her goals.   Pt prognosis is  Good.      Pt will continue to benefit from skilled outpatient physical therapy to address the deficits listed in the problem list box on initial evaluation, provide pt/family education and to maximize pt's level of independence in the home and community environment.      Pt's spiritual, cultural and educational needs considered and pt agreeable to plan of care and goals.     Anticipated barriers to physical therapy: None     Goals:  Short Term Goals: 4 weeks   1. Independent with Home Exercise Program   2. Increase Right Hip Range of Motion to Within Functional Limits   3. Increase Right Hip Strength to grossly 4/5  4. Patient will ambulate 500 feet with bilateral crutches with complaints of pain no grater than 2-3/10     Long Term Goals: 8 weeks   1. Patient will increase Right Hip Strength to grossly 4+/5  2. Patient will ambulate 1000+ feet with no complaints of pain or weakness in Right Hip      PLAN      Plan of care Certification: 9/12/2022 to 11/7/2022.     Outpatient Physical Therapy 2 times weekly for 8 weeks to include the following interventions: Electrical Stimulation Pre-Mod, Gait Training, Manual Therapy, Moist Heat/ Ice, Neuromuscular Re-ed, Patient Education, Therapeutic Activities, Therapeutic Exercise, and Ultrasound.      Blu Dodd, PTA   9/27/2022

## 2022-09-29 ENCOUNTER — CLINICAL SUPPORT (OUTPATIENT)
Dept: REHABILITATION | Facility: HOSPITAL | Age: 72
End: 2022-09-29
Payer: MEDICARE

## 2022-09-29 DIAGNOSIS — M70.61 TROCHANTERIC BURSITIS OF RIGHT HIP: Primary | ICD-10-CM

## 2022-09-29 PROCEDURE — 97110 THERAPEUTIC EXERCISES: CPT | Mod: CQ

## 2022-10-04 ENCOUNTER — CLINICAL SUPPORT (OUTPATIENT)
Dept: REHABILITATION | Facility: HOSPITAL | Age: 72
End: 2022-10-04
Payer: MEDICARE

## 2022-10-04 DIAGNOSIS — M70.61 TROCHANTERIC BURSITIS OF RIGHT HIP: Primary | ICD-10-CM

## 2022-10-04 PROCEDURE — 97112 NEUROMUSCULAR REEDUCATION: CPT

## 2022-10-04 PROCEDURE — 97110 THERAPEUTIC EXERCISES: CPT

## 2022-10-04 NOTE — PROGRESS NOTES
[facility]   Physical Therapy Treatment Note      Name: Francis Parr  Clinic Number: [id number]     Visit Date: 9/20/2022  Therapy Diagnosis: Right gluteus medius repair  Physician: Elton Kent MD     Physician Orders: PT Eval and Treat right hip  Medical Diagnosis from Referral:  Right gluteus medius repair  Evaluation Date: 9/12/2022  Updated Plan of Care Due : 10/11/2022  Authorization Period Expiration: 9/8/2023  Plan of Care Expiration: 11/7/2022     Visit # / Visits authorized: 6/ 16  PTA Visit #: 1/5      Time In: 10:55 am  Time Out: 11:52 pm  Total Billable Time: 53 minutes     SUBJECTIVE      Pt reports: she is getting much stronger and her pain is improving  She was compliant with home exercise program.  Response to previous treatment: sore  Functional change: None     Pain: 0/10  Location: right groin       Prior Level of Function: Patient was enjoying [facility] and fell going down stairs  Current Level of Function: Patient wearing hip brace and using bilateral crutches    Precautions : No Active Hip Abduction until 11/4/2022        OBJECTIVE      Objective Measures updated at progress report unless specified.      Treatment      Francis received the treatments listed below:       therapeutic exercises to develop strength, endurance, and ROM for 45 minutes including:     NuStep - 5 minutes  SB  Hamstring Stretch on Stairs - right - 4x20 seconds     Supine:  STRAIGHT LEG RAISE 20x LLE  Heel slides supine - 30x red band  Knee flexion to 90 degrees     Seated:  Hip Add with Ball - 30x3 seconds  Hip flexion    Cybex Hip - right     Flexion   Extension      Cybex Leg Press - Bilateral 4 plates 30x  Leg Press Single - 2 plates 30x  TKE 4 plates 30x  Sit to Stand from Chair - 2x10  Standing Marches with just right lower extremity at rail 2# 20x   Heel Raises - 3x10 at rail     Cybex Hamstring Curls 3 x 10 with 3 Plates (Added on 10/4)  Cybex Knee Extension 3 x 10 with 2  Plates  (Added on 10/4)     manual therapy techniques: Joint mobilizations, Manual traction, and Myofacial release were applied to the: hip for 0 minutes, including:        neuromuscular re-education activities to improve: Balance, Coordination, Kinesthetic, and Proprioception for 8 minutes. The following activities were included:   LONG ARC QUAD 2# 30x eccentric focus   QUAD SET - 30x 2 second hold   Glute set - 20x (partial bridge)    therapeutic activities to improve functional performance for 0 minutes, including:     gait training to improve functional mobility and safety for 0  minutes, including:           Patient Education and Home Exercises      Home Exercises Provided and Patient Education Provided      Education provided:   - AVS     Written Home Exercises Provided: Patient instructed to cont prior HEP. Exercises were reviewed and Francis was able to demonstrate them prior to the end of the session.  Francis demonstrated good  understanding of the education provided. See EMR under Patient Instructions for exercises provided during therapy sessions     ASSESSMENT      Patient is making excellent progress with Therapy. She is showing good improvement with Strength and mobility. Per MD, She will be able to begin weaning off one crutch on 10/6/2022. She is still limited for Hip Abduction and will not be able to perform Active Hip Abduction until at least 11/4/2022. Patient is very compliant with her Home Exercise Program and works hard in Therapy.  Therapist added Cybex Hamstring Curls and Cybex Leg Extensions today. She tolerated this with no complaints of pain. Will continue to advance patient slowly and safely through her protocol. Sup Visit performed today with HARRIS Mackey and HARRIS Castro.  All goals and treatment plan reviewed. Will work toward completion of all goals set.        PMH from evaluation:   Francis is a 71 y.o. female referred to outpatient Physical Therapy with a medical  diagnosis of Trochanteric bursitis of right hip. Patient presents with right hip pain due to right endoscopic gluteus medius repair 9/9/2022. Patient wearing hip brace to limit abduction/adduction. No active hip abduction for 8 weeks and no passive hip adduction. Patient is 50% Wb'ing using bilateral crutches for 4-6 weeks. Patient's strength and RANGE OF MOTION is limited. Patient expressed good understanding of precautions.        Francis Is progressing towards her goals.   Pt prognosis is Good.      Pt will continue to benefit from skilled outpatient physical therapy to address the deficits listed in the problem list box on initial evaluation, provide pt/family education and to maximize pt's level of independence in the home and community environment.      Pt's spiritual, cultural and educational needs considered and pt agreeable to plan of care and goals.     Anticipated barriers to physical therapy: None     Goals:  Short Term Goals: 4 weeks   1. Independent with Home Exercise Program   2. Increase Right Hip Range of Motion to Within Functional Limits   3. Increase Right Hip Strength to grossly 4/5  4. Patient will ambulate 500 feet with bilateral crutches with complaints of pain no grater than 2-3/10     Long Term Goals: 8 weeks   1. Patient will increase Right Hip Strength to grossly 4+/5  2. Patient will ambulate 1000+ feet with no complaints of pain or weakness in Right Hip      PLAN      Plan of care Certification: 9/12/2022 to 11/7/2022.     Outpatient Physical Therapy 2 times weekly for 8 weeks to include the following interventions: Electrical Stimulation Pre-Mod, Gait Training, Manual Therapy, Moist Heat/ Ice, Neuromuscular Re-ed, Patient Education, Therapeutic Activities, Therapeutic Exercise, and Ultrasound.      Whitney De Paz PT, DPT  10/4/2022

## 2022-10-05 ENCOUNTER — OFFICE VISIT (OUTPATIENT)
Dept: FAMILY MEDICINE | Facility: CLINIC | Age: 72
End: 2022-10-05
Payer: MEDICARE

## 2022-10-05 VITALS
SYSTOLIC BLOOD PRESSURE: 156 MMHG | HEIGHT: 62 IN | BODY MASS INDEX: 26.68 KG/M2 | TEMPERATURE: 97 F | HEART RATE: 72 BPM | DIASTOLIC BLOOD PRESSURE: 71 MMHG | WEIGHT: 145 LBS | OXYGEN SATURATION: 98 %

## 2022-10-05 DIAGNOSIS — Z79.899 HIGH RISK MEDICATION USE: ICD-10-CM

## 2022-10-05 DIAGNOSIS — I10 BENIGN ESSENTIAL HTN: ICD-10-CM

## 2022-10-05 DIAGNOSIS — R73.03 PREDIABETES: ICD-10-CM

## 2022-10-05 DIAGNOSIS — D50.9 IRON DEFICIENCY ANEMIA, UNSPECIFIED IRON DEFICIENCY ANEMIA TYPE: Primary | ICD-10-CM

## 2022-10-05 DIAGNOSIS — E78.00 HYPERCHOLESTEREMIA: ICD-10-CM

## 2022-10-05 DIAGNOSIS — K21.9 GASTROESOPHAGEAL REFLUX DISEASE, UNSPECIFIED WHETHER ESOPHAGITIS PRESENT: ICD-10-CM

## 2022-10-05 LAB
ALBUMIN SERPL BCP-MCNC: 3.9 G/DL (ref 3.5–5)
ALBUMIN/GLOB SERPL: 1.1 {RATIO}
ALP SERPL-CCNC: 65 U/L (ref 55–142)
ALT SERPL W P-5'-P-CCNC: 24 U/L (ref 13–56)
ANION GAP SERPL CALCULATED.3IONS-SCNC: 10 MMOL/L (ref 7–16)
AST SERPL W P-5'-P-CCNC: 17 U/L (ref 15–37)
BASOPHILS # BLD AUTO: 0.05 K/UL (ref 0–0.2)
BASOPHILS NFR BLD AUTO: 0.7 % (ref 0–1)
BILIRUB SERPL-MCNC: 0.4 MG/DL (ref ?–1.2)
BUN SERPL-MCNC: 19 MG/DL (ref 7–18)
BUN/CREAT SERPL: 28 (ref 6–20)
CALCIUM SERPL-MCNC: 9.1 MG/DL (ref 8.5–10.1)
CHLORIDE SERPL-SCNC: 108 MMOL/L (ref 98–107)
CHOLEST SERPL-MCNC: 206 MG/DL (ref 0–200)
CHOLEST/HDLC SERPL: 2.4 {RATIO}
CO2 SERPL-SCNC: 28 MMOL/L (ref 21–32)
CREAT SERPL-MCNC: 0.68 MG/DL (ref 0.55–1.02)
DIFFERENTIAL METHOD BLD: ABNORMAL
EGFR (NO RACE VARIABLE) (RUSH/TITUS): 94 ML/MIN/1.73M²
EOSINOPHIL # BLD AUTO: 0.1 K/UL (ref 0–0.5)
EOSINOPHIL NFR BLD AUTO: 1.4 % (ref 1–4)
ERYTHROCYTE [DISTWIDTH] IN BLOOD BY AUTOMATED COUNT: 13.5 % (ref 11.5–14.5)
EST. AVERAGE GLUCOSE BLD GHB EST-MCNC: 97 MG/DL
FERRITIN SERPL-MCNC: 104 NG/ML (ref 8–252)
FOLATE SERPL-MCNC: >20 NG/ML (ref 3.1–17.5)
GLOBULIN SER-MCNC: 3.5 G/DL (ref 2–4)
GLUCOSE SERPL-MCNC: 89 MG/DL (ref 74–106)
HBA1C MFR BLD HPLC: 5.5 % (ref 4.5–6.6)
HCT VFR BLD AUTO: 35.7 % (ref 38–47)
HDLC SERPL-MCNC: 87 MG/DL (ref 40–60)
HGB BLD-MCNC: 11.4 G/DL (ref 12–16)
IMM GRANULOCYTES # BLD AUTO: 0.01 K/UL (ref 0–0.04)
IMM GRANULOCYTES NFR BLD: 0.1 % (ref 0–0.4)
IRON SATN MFR SERPL: 28 % (ref 14–50)
IRON SERPL-MCNC: 87 ΜG/DL (ref 50–170)
LDLC SERPL CALC-MCNC: 107 MG/DL
LDLC/HDLC SERPL: 1.2 {RATIO}
LYMPHOCYTES # BLD AUTO: 2.69 K/UL (ref 1–4.8)
LYMPHOCYTES NFR BLD AUTO: 38.5 % (ref 27–41)
MCH RBC QN AUTO: 31.3 PG (ref 27–31)
MCHC RBC AUTO-ENTMCNC: 31.9 G/DL (ref 32–36)
MCV RBC AUTO: 98.1 FL (ref 80–96)
MONOCYTES # BLD AUTO: 0.53 K/UL (ref 0–0.8)
MONOCYTES NFR BLD AUTO: 7.6 % (ref 2–6)
MPC BLD CALC-MCNC: 10.5 FL (ref 9.4–12.4)
NEUTROPHILS # BLD AUTO: 3.61 K/UL (ref 1.8–7.7)
NEUTROPHILS NFR BLD AUTO: 51.7 % (ref 53–65)
NONHDLC SERPL-MCNC: 119 MG/DL
NRBC # BLD AUTO: 0 X10E3/UL
NRBC, AUTO (.00): 0 %
PLATELET # BLD AUTO: 305 K/UL (ref 150–400)
POTASSIUM SERPL-SCNC: 4.6 MMOL/L (ref 3.5–5.1)
PROT SERPL-MCNC: 7.4 G/DL (ref 6.4–8.2)
RBC # BLD AUTO: 3.64 M/UL (ref 4.2–5.4)
SODIUM SERPL-SCNC: 141 MMOL/L (ref 136–145)
TIBC SERPL-MCNC: 306 ΜG/DL (ref 250–450)
TRIGL SERPL-MCNC: 61 MG/DL (ref 35–150)
VIT B12 SERPL-MCNC: 658 PG/ML (ref 193–986)
VLDLC SERPL-MCNC: 12 MG/DL
WBC # BLD AUTO: 6.99 K/UL (ref 4.5–11)

## 2022-10-05 PROCEDURE — 85025 CBC WITH DIFFERENTIAL: ICD-10-PCS | Mod: ,,, | Performed by: CLINICAL MEDICAL LABORATORY

## 2022-10-05 PROCEDURE — 80053 COMPREHENSIVE METABOLIC PANEL: ICD-10-PCS | Mod: ,,, | Performed by: CLINICAL MEDICAL LABORATORY

## 2022-10-05 PROCEDURE — 82728 FERRITIN: ICD-10-PCS | Mod: ,,, | Performed by: CLINICAL MEDICAL LABORATORY

## 2022-10-05 PROCEDURE — 82746 VITAMIN B12/FOLATE, SERUM PANEL: ICD-10-PCS | Mod: ,,, | Performed by: CLINICAL MEDICAL LABORATORY

## 2022-10-05 PROCEDURE — 82746 ASSAY OF FOLIC ACID SERUM: CPT | Mod: ,,, | Performed by: CLINICAL MEDICAL LABORATORY

## 2022-10-05 PROCEDURE — 83036 HEMOGLOBIN A1C: ICD-10-PCS | Mod: ,,, | Performed by: CLINICAL MEDICAL LABORATORY

## 2022-10-05 PROCEDURE — 82607 VITAMIN B-12: CPT | Mod: ,,, | Performed by: CLINICAL MEDICAL LABORATORY

## 2022-10-05 PROCEDURE — 83540 ASSAY OF IRON: CPT | Mod: ,,, | Performed by: CLINICAL MEDICAL LABORATORY

## 2022-10-05 PROCEDURE — 99214 PR OFFICE/OUTPT VISIT, EST, LEVL IV, 30-39 MIN: ICD-10-PCS | Mod: ,,, | Performed by: NURSE PRACTITIONER

## 2022-10-05 PROCEDURE — 83540 IRON AND TIBC: ICD-10-PCS | Mod: ,,, | Performed by: CLINICAL MEDICAL LABORATORY

## 2022-10-05 PROCEDURE — 99214 OFFICE O/P EST MOD 30 MIN: CPT | Mod: ,,, | Performed by: NURSE PRACTITIONER

## 2022-10-05 PROCEDURE — 83550 IRON AND TIBC: ICD-10-PCS | Mod: ,,, | Performed by: CLINICAL MEDICAL LABORATORY

## 2022-10-05 PROCEDURE — 83036 HEMOGLOBIN GLYCOSYLATED A1C: CPT | Mod: ,,, | Performed by: CLINICAL MEDICAL LABORATORY

## 2022-10-05 PROCEDURE — 80061 LIPID PANEL: ICD-10-PCS | Mod: ,,, | Performed by: CLINICAL MEDICAL LABORATORY

## 2022-10-05 PROCEDURE — 80053 COMPREHEN METABOLIC PANEL: CPT | Mod: ,,, | Performed by: CLINICAL MEDICAL LABORATORY

## 2022-10-05 PROCEDURE — 83550 IRON BINDING TEST: CPT | Mod: ,,, | Performed by: CLINICAL MEDICAL LABORATORY

## 2022-10-05 PROCEDURE — 82728 ASSAY OF FERRITIN: CPT | Mod: ,,, | Performed by: CLINICAL MEDICAL LABORATORY

## 2022-10-05 PROCEDURE — 85025 COMPLETE CBC W/AUTO DIFF WBC: CPT | Mod: ,,, | Performed by: CLINICAL MEDICAL LABORATORY

## 2022-10-05 PROCEDURE — 80061 LIPID PANEL: CPT | Mod: ,,, | Performed by: CLINICAL MEDICAL LABORATORY

## 2022-10-05 PROCEDURE — 82607 VITAMIN B12/FOLATE, SERUM PANEL: ICD-10-PCS | Mod: ,,, | Performed by: CLINICAL MEDICAL LABORATORY

## 2022-10-05 RX ORDER — METFORMIN HYDROCHLORIDE 500 MG/1
500 TABLET ORAL 3 TIMES DAILY
Qty: 270 TABLET | Refills: 1 | Status: SHIPPED | OUTPATIENT
Start: 2022-10-05 | End: 2023-04-24 | Stop reason: SDUPTHER

## 2022-10-05 RX ORDER — EZETIMIBE 10 MG/1
10 TABLET ORAL DAILY
Qty: 90 TABLET | Refills: 1 | Status: SHIPPED | OUTPATIENT
Start: 2022-10-05 | End: 2023-04-24 | Stop reason: SDUPTHER

## 2022-10-05 RX ORDER — OMEPRAZOLE 20 MG/1
20 CAPSULE, DELAYED RELEASE ORAL DAILY
Qty: 90 CAPSULE | Refills: 1 | Status: SHIPPED | OUTPATIENT
Start: 2022-10-05 | End: 2023-04-24 | Stop reason: SDUPTHER

## 2022-10-05 RX ORDER — FERROUS SULFATE 325(65) MG
325 TABLET ORAL
Qty: 90 TABLET | Refills: 1 | Status: SHIPPED | OUTPATIENT
Start: 2022-10-05 | End: 2023-04-24 | Stop reason: SDUPTHER

## 2022-10-05 RX ORDER — LOSARTAN POTASSIUM 25 MG/1
25 TABLET ORAL DAILY
Qty: 90 TABLET | Refills: 1 | Status: SHIPPED | OUTPATIENT
Start: 2022-10-05 | End: 2023-04-24 | Stop reason: SDUPTHER

## 2022-10-05 NOTE — PROGRESS NOTES
Clinic note     Patient name: Lynda Parr is a 71 y.o. female   Chief compliant   Chief Complaint   Patient presents with    Follow-up    Medication Refill     Fasting for labs. Wants to check her labs kidneys, some swelling in her legs.        Subjective     History of present illness   In clinic for routine follow up, lab and medication refills   Denies any acute concerns at present   She has recently had surgery by Dr Kent, [facility] for tear right hip gluteus minimus, record reviewed in media; she is currently participating in physical therapy   She is followed by Dr Delgado for pain management, recent LESI on 7/8/22 which was effective for lower back pain; last clinic note reviewed    EMG lower extremities were ordered by Dr Davalos to be obtained at [facility] by Dr Carmen; pt reports Dr Carmen was out of the office and it was going to be several months to get appointment, she has not proceeded with this as of yet   Mammogram: due November 2022  Colonoscopy: due 2023   PMH: hypertension, hyperlipidemia, prediabetes, insomnia , ID anemia  Last A1c was 5.9, this will be rechecked today   She is taking otc mag supplement for leg cramps which has been effective and taking probiotic for intermittent constipation  Blood pressure is elevated in clinic; she routinely monitors at home and reports readings are normal 120/80's      Social History     Tobacco Use    Smoking status: Never    Smokeless tobacco: Never   Substance Use Topics    Alcohol use: Yes    Drug use: Never       Review of patient's allergies indicates:   Allergen Reactions    Ace inhibitors Other (See Comments)       Past Medical History:   Diagnosis Date    Benign essential HTN     Carpal tunnel syndrome, bilateral upper limbs     Chronic low back pain     Degeneration, intervertebral disc, lumbosacral     GERD (gastroesophageal reflux disease)     Hypercholesterolemia     Insomnia     Iron deficiency anemia     Localized swelling, mass and  lump, right lower limb     Lower extremity edema     Lumbar spondylosis     Prediabetes     Restless leg     Slow transit constipation     Spinal stenosis, lumbar     Vitamin D deficiency        Past Surgical History:   Procedure Laterality Date    CATARACT EXTRACTION Left 09/10/2020    CATARACT EXTRACTION Right 10/01/2020    Dr. Stephen Fox    CHOLECYSTECTOMY  1990    COSMETIC SURGERY      eyelid    EPIDURAL STEROID INJECTION INTO LUMBAR SPINE N/A 7/21/2022    Procedure: Injection-steroid-epidural-lumbar  L3-L4  CHRIS  NO SEDATION;  Surgeon: Esther Delgado MD;  Location: [facility];  Service: Pain Management;  Laterality: N/A;  STATES HAD VAC  WILL BRING CARD    HIP ARTHROPLASTY Left 10/2015    repair of torn gluteus medius muscle via hip arthroscopy at [facility], Dr. Kent    SHOULDER ARTHROSCOPY W/ ROTATOR CUFF REPAIR  2005    SINUS SURGERY  02/2011    TUBAL LIGATION      VASCULAR SURGERY  12/15/2017    R SSV Laser Ablation per Dr. Herman Bullard        Family History   Problem Relation Age of Onset    Hypertension Mother     Coronary artery disease Father     Pulmonary embolism Brother     No Known Problems Son          Current Outpatient Medications:     aspirin 81 MG Chew, Take 81 mg by mouth once daily., Disp: , Rfl:     ezetimibe (ZETIA) 10 mg tablet, Take 1 tablet (10 mg total) by mouth once daily., Disp: 90 tablet, Rfl: 1    ferrous sulfate (FEOSOL) 325 mg (65 mg iron) Tab tablet, Take 1 tablet (325 mg total) by mouth daily with breakfast., Disp: 90 tablet, Rfl: 1    losartan (COZAAR) 25 MG tablet, Take 1 tablet (25 mg total) by mouth once daily., Disp: 90 tablet, Rfl: 1    metFORMIN (GLUCOPHAGE) 500 MG tablet, Take 1 tablet (500 mg total) by mouth 3 (three) times daily., Disp: 270 tablet, Rfl: 1    multivitamin (THERAGRAN) per tablet, Take 1 tablet by mouth once daily., Disp: , Rfl:     omeprazole (PRILOSEC) 20 MG capsule, Take 1 capsule (20 mg total) by mouth once daily., Disp: 90  "capsule, Rfl: 1    pyridoxine, vitamin B6, (B-6) 100 MG Tab, Take 50 mg by mouth once daily., Disp: , Rfl:     vitamin D (VITAMIN D3) 1000 units Tab, Take 1,000 Units by mouth once daily., Disp: , Rfl:     zinc gluconate 50 mg tablet, Take 50 mg by mouth once daily., Disp: , Rfl:     Review of Systems   Constitutional:  Negative for appetite change, chills, fatigue, fever and unexpected weight change.   Eyes:  Negative for visual disturbance.   Respiratory:  Negative for cough and shortness of breath.    Cardiovascular:  Negative for chest pain, palpitations and leg swelling.   Gastrointestinal:  Negative for abdominal pain, change in bowel habit, constipation, diarrhea, nausea, vomiting and change in bowel habit.   Endocrine: Negative for polydipsia and polyuria.   Genitourinary:  Negative for dysuria.   Musculoskeletal:  Positive for arthralgias, back pain and gait problem. Negative for myalgias.   Integumentary:  Negative for wound.   Neurological:  Negative for dizziness, syncope, light-headedness and headaches.   Psychiatric/Behavioral:  Negative for confusion, dysphoric mood and sleep disturbance. The patient is not nervous/anxious.      Objective     BP (!) 156/71   Pulse 72   Temp 97.3 °F (36.3 °C)   Ht 5' 2" (1.575 m)   Wt 65.8 kg (145 lb)   SpO2 98%   BMI 26.52 kg/m²     Physical Exam   Constitutional: She is oriented to person, place, and time. No distress.   HENT:   Head: Atraumatic.   Mouth/Throat: Mucous membranes are moist.   Eyes: Pupils are equal, round, and reactive to light. Conjunctivae are normal.   Cardiovascular: Normal rate and regular rhythm.   Compression socks on BLE  Pulmonary:      Effort: Pulmonary effort is normal. No respiratory distress.      Breath sounds: Normal breath sounds. No wheezing, rhonchi or rales.     Abdominal: Soft. Bowel sounds are normal. She exhibits no distension. There is no abdominal tenderness.   Musculoskeletal:         General: Normal range of motion.     "  Cervical back: Neck supple.      Right lower leg: No edema.      Left lower leg: No edema.   Neurological: She is alert and oriented to person, place, and time. Gait abnormal.   On one crutch; brace to right hip r/t recent surgery    Skin: Skin is warm and dry.   Psychiatric: Her behavior is normal. Mood normal.     Lab Results   Component Value Date    WBC 8.90 05/18/2022    HGB 11.0 (L) 05/18/2022    HCT 33.5 (L) 05/18/2022    MCV 94.6 05/18/2022     05/18/2022       CMP  Sodium   Date Value Ref Range Status   05/18/2022 139 136 - 145 mmol/L Final     Potassium   Date Value Ref Range Status   05/18/2022 4.2 3.5 - 5.1 mmol/L Final     Chloride   Date Value Ref Range Status   05/18/2022 105 98 - 107 mmol/L Final     CO2   Date Value Ref Range Status   05/18/2022 26 21 - 32 mmol/L Final     Glucose   Date Value Ref Range Status   05/18/2022 93 74 - 106 mg/dL Final     BUN   Date Value Ref Range Status   05/18/2022 26 (H) 7 - 18 mg/dL Final     Creatinine   Date Value Ref Range Status   05/18/2022 0.81 0.55 - 1.02 mg/dL Final     Calcium   Date Value Ref Range Status   05/18/2022 10.1 8.5 - 10.1 mg/dL Final     Total Protein   Date Value Ref Range Status   05/18/2022 7.5 6.4 - 8.2 g/dL Final     Albumin   Date Value Ref Range Status   05/18/2022 4.0 3.5 - 5.0 g/dL Final     Bilirubin, Total   Date Value Ref Range Status   05/18/2022 0.5 0.0 - 1.2 mg/dL Final     Alk Phos   Date Value Ref Range Status   05/18/2022 61 55 - 142 U/L Final     AST   Date Value Ref Range Status   05/18/2022 24 15 - 37 U/L Final     ALT   Date Value Ref Range Status   05/18/2022 33 13 - 56 U/L Final     Anion Gap   Date Value Ref Range Status   05/18/2022 12 7 - 16 mmol/L Final     eGFR   Date Value Ref Range Status   05/18/2022 75 >=60 mL/min/1.73m² Final     Lab Results   Component Value Date    TSH 2.210 10/04/2021     Lab Results   Component Value Date    CHOL 185 10/04/2021     Lab Results   Component Value Date    HDL 71 (H)  10/04/2021     Lab Results   Component Value Date    LDLCALC 101 10/04/2021     Lab Results   Component Value Date    TRIG 65 10/04/2021     Lab Results   Component Value Date    CHOLHDL 2.6 10/04/2021     Lab Results   Component Value Date    HGBA1C 5.9 04/05/2022         Assessment and Plan   Iron deficiency anemia, unspecified iron deficiency anemia type  -     CBC Auto Differential; Future; Expected date: 10/05/2022  -     Iron and TIBC; Future; Expected date: 10/05/2022  -     Ferritin; Future; Expected date: 10/05/2022  -     Vitamin B12 & Folate; Future; Expected date: 10/05/2022  -     ferrous sulfate (FEOSOL) 325 mg (65 mg iron) Tab tablet; Take 1 tablet (325 mg total) by mouth daily with breakfast.  Dispense: 90 tablet; Refill: 1    Benign essential HTN  -     CBC Auto Differential; Future; Expected date: 10/05/2022  -     Comprehensive Metabolic Panel; Future; Expected date: 10/05/2022  -     losartan (COZAAR) 25 MG tablet; Take 1 tablet (25 mg total) by mouth once daily.  Dispense: 90 tablet; Refill: 1    Gastroesophageal reflux disease, unspecified whether esophagitis present  -     omeprazole (PRILOSEC) 20 MG capsule; Take 1 capsule (20 mg total) by mouth once daily.  Dispense: 90 capsule; Refill: 1    Prediabetes  -     Hemoglobin A1C; Future; Expected date: 10/05/2022  -     metFORMIN (GLUCOPHAGE) 500 MG tablet; Take 1 tablet (500 mg total) by mouth 3 (three) times daily.  Dispense: 270 tablet; Refill: 1    Hypercholesteremia  -     Lipid Panel; Future; Expected date: 10/05/2022  -     ezetimibe (ZETIA) 10 mg tablet; Take 1 tablet (10 mg total) by mouth once daily.  Dispense: 90 tablet; Refill: 1    High risk medication use  -     Vitamin B12 & Folate; Future; Expected date: 10/05/2022    BMI 26.0-26.9,adult        Patient Instructions  Patient Instructions   Lab obtained in clinic today, we will notify you of results and any necessary changes to plan of care   Refills on routine medications   Follow  up in six months and as needed     Tests to Keep You Healthy    Mammogram: Met on 10/4/2021  Colon Cancer Screening: Met on 10/5/2020  Last Blood Pressure <= 139/89 (10/5/2022): NO    Colonoscopy by Dr Koch 10/5/2020 recommended to repeat in three years

## 2022-10-05 NOTE — PATIENT INSTRUCTIONS
Lab obtained in clinic today, we will notify you of results and any necessary changes to plan of care   Refills on routine medications   Follow up in six months and as needed     Tests to Keep You Healthy    Mammogram: Met on 10/4/2021  Colon Cancer Screening: Met on 10/5/2020  Last Blood Pressure <= 139/89 (10/5/2022): NO    Colonoscopy by Dr Koch 10/5/2020 recommended to repeat in three years

## 2022-10-06 ENCOUNTER — CLINICAL SUPPORT (OUTPATIENT)
Dept: REHABILITATION | Facility: HOSPITAL | Age: 72
End: 2022-10-06
Payer: MEDICARE

## 2022-10-06 DIAGNOSIS — M70.61 TROCHANTERIC BURSITIS OF RIGHT HIP: Primary | ICD-10-CM

## 2022-10-06 PROCEDURE — 97110 THERAPEUTIC EXERCISES: CPT | Mod: CQ

## 2022-10-06 NOTE — PROGRESS NOTES
[facility]   Physical Therapy Treatment Note      Name: Francis Parr  Clinic Number: [id number]     Visit Date: 9/20/2022  Therapy Diagnosis: Right gluteus medius repair  Physician: Elton Kent MD     Physician Orders: PT Eval and Treat right hip  Medical Diagnosis from Referral:  Right gluteus medius repair  Evaluation Date: 9/12/2022  Updated Plan of Care Due : 10/11/2022  Authorization Period Expiration: 9/8/2023  Plan of Care Expiration: 11/7/2022     Visit # / Visits authorized: 6/ 16  PTA Visit #: 1/5      Time In: 10:43 am  Time Out: 11:32 pm  Total Billable Time: 49 minutes     SUBJECTIVE      Pt reports: she is getting much stronger and her pain is improving  She was compliant with home exercise program.  Response to previous treatment: sore  Functional change: None     Pain: 0/10  Location: right groin       Prior Level of Function: Patient was enjoying [facility] and fell going down stairs  Current Level of Function: Patient wearing hip brace and using bilateral crutches    Precautions : No Active Hip Abduction until 11/4/2022        OBJECTIVE      Objective Measures updated at progress report unless specified.      Treatment      Francis received the treatments listed below:       therapeutic exercises to develop strength, endurance, and ROM for 45 minutes including:     NuStep - 5 minutes  SB - 4x20 seconds  Hamstring Stretch on Stairs - right - 4x20 seconds     Supine:  STRAIGHT LEG RAISE 20x LLE  Heel slides supine - 30x red band  Knee flexion to 90 degrees     Seated:  Hip Add with Ball - 30x3 seconds  Hip flexion    Cybex Hip - right     Flexion   Extension      Cybex Leg Press - Bilateral 5 plates 30x  Leg Press Single - 3 plates 30x  TKE 4 plates 30x  Sit to Stand from Chair - 2x10  Standing Marches with just right lower extremity at rail 2# 20x   Heel Raises - 3x10 at rail     Cybex Hamstring Curls 3 x 10 with 3.5 Plates (Added on 10/4)  Cybex Knee Extension  3 x 10 with 2 Plates  (Added on 10/4)     manual therapy techniques: Joint mobilizations, Manual traction, and Myofacial release were applied to the: hip for 0 minutes, including:        neuromuscular re-education activities to improve: Balance, Coordination, Kinesthetic, and Proprioception for 0 minutes. The following activities were included:   LONG ARC QUAD 2# 30x eccentric focus   QUAD SET - 30x 2 second hold   Glute set - 20x (partial bridge)    therapeutic activities to improve functional performance for 0 minutes, including:     gait training to improve functional mobility and safety for 0  minutes, including:           Patient Education and Home Exercises      Home Exercises Provided and Patient Education Provided      Education provided:   - AVS     Written Home Exercises Provided: Patient instructed to cont prior HEP. Exercises were reviewed and Francis was able to demonstrate them prior to the end of the session.  Francis demonstrated good  understanding of the education provided. See EMR under Patient Instructions for exercises provided during therapy sessions     ASSESSMENT      Pt tolerated all therapeutic exercises today with fatigue noted. Pt has minimal trendelenburg gait pattern when ambulating with single AD. Progressed LE strengthening per pt tolerance. Educated pt to keep up with her HEP diligently.        PMH from evaluation:   Francis is a 71 y.o. female referred to outpatient Physical Therapy with a medical diagnosis of Trochanteric bursitis of right hip. Patient presents with right hip pain due to right endoscopic gluteus medius repair 9/9/2022. Patient wearing hip brace to limit abduction/adduction. No active hip abduction for 8 weeks and no passive hip adduction. Patient is 50% Wb'ing using bilateral crutches for 4-6 weeks. Patient's strength and RANGE OF MOTION is limited. Patient expressed good understanding of precautions.        Francis Is progressing towards her goals.   Pt prognosis  is Good.      Pt will continue to benefit from skilled outpatient physical therapy to address the deficits listed in the problem list box on initial evaluation, provide pt/family education and to maximize pt's level of independence in the home and community environment.      Pt's spiritual, cultural and educational needs considered and pt agreeable to plan of care and goals.     Anticipated barriers to physical therapy: None     Goals:  Short Term Goals: 4 weeks   1. Independent with Home Exercise Program   2. Increase Right Hip Range of Motion to Within Functional Limits   3. Increase Right Hip Strength to grossly 4/5  4. Patient will ambulate 500 feet with bilateral crutches with complaints of pain no grater than 2-3/10     Long Term Goals: 8 weeks   1. Patient will increase Right Hip Strength to grossly 4+/5  2. Patient will ambulate 1000+ feet with no complaints of pain or weakness in Right Hip      PLAN      Plan of care Certification: 9/12/2022 to 11/7/2022.     Outpatient Physical Therapy 2 times weekly for 8 weeks to include the following interventions: Electrical Stimulation Pre-Mod, Gait Training, Manual Therapy, Moist Heat/ Ice, Neuromuscular Re-ed, Patient Education, Therapeutic Activities, Therapeutic Exercise, and Ultrasound.      Levon Whitley, PTA  10/6/2022

## 2022-10-09 DIAGNOSIS — Z71.89 COMPLEX CARE COORDINATION: ICD-10-CM

## 2022-10-11 ENCOUNTER — CLINICAL SUPPORT (OUTPATIENT)
Dept: REHABILITATION | Facility: HOSPITAL | Age: 72
End: 2022-10-11
Payer: MEDICARE

## 2022-10-11 DIAGNOSIS — M70.61 TROCHANTERIC BURSITIS OF RIGHT HIP: Primary | ICD-10-CM

## 2022-10-11 PROCEDURE — 97110 THERAPEUTIC EXERCISES: CPT | Mod: CQ

## 2022-10-11 NOTE — PLAN OF CARE
[facility]   Physical Therapy Updated PLAN OF CARE      Name: Francis Parr  Clinic Number: [id number]     Visit Date: 9/20/2022  Therapy Diagnosis: Right gluteus medius repair  Physician: Elton Kent MD     Physician Orders: PT Eval and Treat right hip  Medical Diagnosis from Referral:  Right gluteus medius repair  Evaluation Date: 9/12/2022  Updated Plan of Care Due : 11/10/2022  Authorization Period Expiration: 9/8/2023  Plan of Care Expiration: 11/7/2022     Visit # / Visits authorized: 8 / 16  PTA Visit #: 2/5      Time In: 10:00 am  Time Out: 10:45 pm  Total Billable Time: 45 minutes     SUBJECTIVE      Pt reports: she has no pain today and is doing well  She was compliant with home exercise program.  Response to previous treatment: sore  Functional change: None     Pain: 0/10  Location: right groin       Prior Level of Function: Patient was enjoying long-term and fell going down stairs  Current Level of Function: Patient wearing hip brace and using bilateral crutches    Precautions : No Active Hip Abduction until 11/4/2022        OBJECTIVE      Objective Measures updated at progress report unless specified.      Treatment      Francis received the treatments listed below:       therapeutic exercises to develop strength, endurance, and ROM for 45 minutes including:     NuStep - 5 minutes  SB - 4x20 seconds  Hamstring Stretch on Stairs - right - 4x20 seconds  Quad Stretch 4x20 seconds       Seated:  Hip Add with Ball - 30x3 seconds  Hip flexion    Cybex Hip - right     Flexion   Extension      Cybex Leg Press - Bilateral 5 plates 30x  Leg Press Single - 3 plates 30x  TKE 4 plates 30x  Sit to Stand from Chair - 2x10  Standing Marches with just right lower extremity at rail 2# 20x   Heel Raises - 3x10 at rail  Bridges - 30x  SLR - 2# 20x  Prone extension 2# 20x     Cybex Hamstring Curls 3 x 10 with 3.5 Plates (Added on 10/4)  Cybex Knee Extension 3 x 10 with 2 Plates  (Added  on 10/4)     manual therapy techniques: Joint mobilizations, Manual traction, and Myofacial release were applied to the: hip for 0 minutes, including:        neuromuscular re-education activities to improve: Balance, Coordination, Kinesthetic, and Proprioception for 0 minutes. The following activities were included:   LONG ARC QUAD 2# 30x eccentric focus   QUAD SET - 30x 2 second hold       therapeutic activities to improve functional performance for 0 minutes, including:     gait training to improve functional mobility and safety for 0  minutes, including:           Patient Education and Home Exercises      Home Exercises Provided and Patient Education Provided      Education provided:   - AVS     Written Home Exercises Provided: Patient instructed to cont prior HEP. Exercises were reviewed and Francis was able to demonstrate them prior to the end of the session.  Francis demonstrated good  understanding of the education provided. See EMR under Patient Instructions for exercises provided during therapy sessions     ASSESSMENT      Pt tolerated all therapeutic exercises today with min. C/o pain noted. Pt is doing well and progressing well towards her goals. Strength is improving each visit. Educated pt to keep up with her HEP.     Patient met STG's except RANGE OF MOTION due to prescribed restrictions. Patient upgraded to one crutch with patient demonstrating good technique.     PMH from evaluation:   Francis is a 71 y.o. female referred to outpatient Physical Therapy with a medical diagnosis of Trochanteric bursitis of right hip. Patient presents with right hip pain due to right endoscopic gluteus medius repair 9/9/2022. Patient wearing hip brace to limit abduction/adduction. No active hip abduction for 8 weeks and no passive hip adduction. Patient is 50% Wb'ing using bilateral crutches for 4-6 weeks. Patient's strength and RANGE OF MOTION is limited. Patient expressed good understanding of precautions.         Francis Is progressing towards her goals.   Pt prognosis is Good.      Pt will continue to benefit from skilled outpatient physical therapy to address the deficits listed in the problem list box on initial evaluation, provide pt/family education and to maximize pt's level of independence in the home and community environment.      Pt's spiritual, cultural and educational needs considered and pt agreeable to plan of care and goals.     Anticipated barriers to physical therapy: None     Goals:  Short Term Goals: 4 weeks   1. Independent with Home Exercise Program : Met   2. Increase Right Hip Range of Motion to Within Functional Limits : Not met according to restrictions  3. Increase Right Hip Strength to grossly 4/5 : Met  4. Patient will ambulate 500 feet with bilateral crutches with complaints of pain no grater than 2-3/10 : Met     Long Term Goals: 8 weeks   1. Patient will increase Right Hip Strength to grossly 4+/5 :Ongoing  2. Patient will ambulate 1000+ feet with no complaints of pain or weakness in Right Hip : Ongoing    Reasons for Recertification of Therapy: Patient is making excellent progress and has met STG's except RANGE OF MOTION due to prescribed restrictions and hip brace.    Plan     Updated Certification Period: 10/11/2022 to 11/10/2022  Recommended Treatment Plan: 2 times per week for 4 weeks: Gait Training, Manual Therapy, Moist Heat/ Ice, Neuromuscular Re-ed, Patient Education, Therapeutic Activities, and Therapeutic Exercise  Other Recommendations: None    REBEKA BERG, PT, MLT    10/11/2022      I CERTIFY THE NEED FOR THESE SERVICES FURNISHED UNDER THIS PLAN OF TREATMENT AND WHILE UNDER MY CARE.    Physician's comments:      Physician's Signature: ___________________________________________________

## 2022-10-11 NOTE — PROGRESS NOTES
[facility]   Physical Therapy Treatment Note      Name: Francis Parr  Clinic Number: [id number]     Visit Date: 9/20/2022  Therapy Diagnosis: Right gluteus medius repair  Physician: Elton Kent MD     Physician Orders: PT Eval and Treat right hip  Medical Diagnosis from Referral:  Right gluteus medius repair  Evaluation Date: 9/12/2022  Updated Plan of Care Due : 11/10/2022  Authorization Period Expiration: 9/8/2023  Plan of Care Expiration: 11/7/2022     Visit # / Visits authorized: 8 / 16  PTA Visit #: 2/5      Time In: 10:00 am  Time Out: 10:45 pm  Total Billable Time: 45 minutes     SUBJECTIVE      Pt reports: she has no pain today and is doing well  She was compliant with home exercise program.  Response to previous treatment: sore  Functional change: None     Pain: 0/10  Location: right groin       Prior Level of Function: Patient was enjoying [employer] and fell going down stairs  Current Level of Function: Patient wearing hip brace and using bilateral crutches    Precautions : No Active Hip Abduction until 11/4/2022        OBJECTIVE      Objective Measures updated at progress report unless specified.      Treatment      Francis received the treatments listed below:       therapeutic exercises to develop strength, endurance, and ROM for 45 minutes including:     NuStep - 5 minutes  SB - 4x20 seconds  Hamstring Stretch on Stairs - right - 4x20 seconds  Quad Stretch 4x20 seconds       Seated:  Hip Add with Ball - 30x3 seconds  Hip flexion    Cybex Hip - right     Flexion   Extension      Cybex Leg Press - Bilateral 5 plates 30x  Leg Press Single - 3 plates 30x  TKE 4 plates 30x  Sit to Stand from Chair - 2x10  Standing Marches with just right lower extremity at rail 2# 20x   Heel Raises - 3x10 at rail  Bridges - 30x  SLR - 2# 20x  Prone extension 2# 20x     Cybex Hamstring Curls 3 x 10 with 3.5 Plates (Added on 10/4)  Cybex Knee Extension 3 x 10 with 2 Plates  (Added on  10/4)     manual therapy techniques: Joint mobilizations, Manual traction, and Myofacial release were applied to the: hip for 0 minutes, including:        neuromuscular re-education activities to improve: Balance, Coordination, Kinesthetic, and Proprioception for 0 minutes. The following activities were included:   LONG ARC QUAD 2# 30x eccentric focus   QUAD SET - 30x 2 second hold       therapeutic activities to improve functional performance for 0 minutes, including:     gait training to improve functional mobility and safety for 0  minutes, including:           Patient Education and Home Exercises      Home Exercises Provided and Patient Education Provided      Education provided:   - AVS     Written Home Exercises Provided: Patient instructed to cont prior HEP. Exercises were reviewed and Francis was able to demonstrate them prior to the end of the session.  Francis demonstrated good  understanding of the education provided. See EMR under Patient Instructions for exercises provided during therapy sessions     ASSESSMENT      Pt tolerated all therapeutic exercises today with min. C/o pain noted. Pt is doing well and progressing well towards her goals. Strength is improving each visit. Educated pt to keep up with her HEP.     Patient met STG's except RANGE OF MOTION due to prescribed restrictions. Patient upgraded to one crutch with patient demonstrating good technique.     PMH from evaluation:   Francis is a 71 y.o. female referred to outpatient Physical Therapy with a medical diagnosis of Trochanteric bursitis of right hip. Patient presents with right hip pain due to right endoscopic gluteus medius repair 9/9/2022. Patient wearing hip brace to limit abduction/adduction. No active hip abduction for 8 weeks and no passive hip adduction. Patient is 50% Wb'ing using bilateral crutches for 4-6 weeks. Patient's strength and RANGE OF MOTION is limited. Patient expressed good understanding of precautions.        Francis  Is progressing towards her goals.   Pt prognosis is Good.      Pt will continue to benefit from skilled outpatient physical therapy to address the deficits listed in the problem list box on initial evaluation, provide pt/family education and to maximize pt's level of independence in the home and community environment.      Pt's spiritual, cultural and educational needs considered and pt agreeable to plan of care and goals.     Anticipated barriers to physical therapy: None     Goals:  Short Term Goals: 4 weeks   1. Independent with Home Exercise Program : Met   2. Increase Right Hip Range of Motion to Within Functional Limits : Not met according to restrictions  3. Increase Right Hip Strength to grossly 4/5 : Met  4. Patient will ambulate 500 feet with bilateral crutches with complaints of pain no grater than 2-3/10 : Met     Long Term Goals: 8 weeks   1. Patient will increase Right Hip Strength to grossly 4+/5 :Ongoing  2. Patient will ambulate 1000+ feet with no complaints of pain or weakness in Right Hip : Ongoing     PLAN      Plan of care Certification: 9/12/2022 to 11/7/2022.     Outpatient Physical Therapy 2 times weekly for 8 weeks to include the following interventions: Electrical Stimulation Pre-Mod, Gait Training, Manual Therapy, Moist Heat/ Ice, Neuromuscular Re-ed, Patient Education, Therapeutic Activities, Therapeutic Exercise, and Ultrasound.      Levon Whitley, PTA  10/11/2022

## 2022-10-13 ENCOUNTER — CLINICAL SUPPORT (OUTPATIENT)
Dept: REHABILITATION | Facility: HOSPITAL | Age: 72
End: 2022-10-13
Payer: MEDICARE

## 2022-10-13 DIAGNOSIS — M70.61 TROCHANTERIC BURSITIS OF RIGHT HIP: Primary | ICD-10-CM

## 2022-10-13 PROCEDURE — 97110 THERAPEUTIC EXERCISES: CPT

## 2022-10-13 NOTE — PROGRESS NOTES
[facility]   Physical Therapy Treatment Note      Name: Francis Parr  Clinic Number: [id number]     Visit Date: 9/20/2022  Therapy Diagnosis: Right gluteus medius repair  Physician: Elton Kent MD     Physician Orders: PT Eval and Treat right hip  Medical Diagnosis from Referral:  Right gluteus medius repair  Evaluation Date: 9/12/2022  Updated Plan of Care Due : 11/10/2022  Authorization Period Expiration: 9/8/2023  Plan of Care Expiration: 11/7/2022     Visit # / Visits authorized: 9 / 16  PTA Visit #: 2/5      Time In: 11:00 am  Time Out: 12:00 pm  Total Billable Time: 53 minutes     SUBJECTIVE      Pt reports: she is feeling stronger. She presented to Therapy with one crutch on the Left   She was compliant with home exercise program.  Response to previous treatment: sore  Functional change: None     Pain: 0/10  Location: right groin       Prior Level of Function: Patient was enjoying snf and fell going down stairs  Current Level of Function: Patient wearing hip brace and using bilateral crutches    Precautions : No Active Hip Abduction until 11/4/2022        OBJECTIVE      Objective Measures updated at progress report unless specified.      Treatment      Francis received the treatments listed below:       therapeutic exercises to develop strength, endurance, and ROM for 53 minutes including:     NuStep/Bike - 5 minutes on the Bike  SB - 4x20 seconds  Hamstring Stretch on Stairs - right - 4x20 seconds  Quad Stretch 4x20 seconds       Seated:  Hip Add with Ball - 30x3 seconds  Hip flexion    Cybex Hip - right     Flexion   Extension      Cybex Leg Press - Bilateral 5 plates 30x  Leg Press Single - 3 plates 30x  TKE 4 plates 30x  Sit to Stand from Chair - 2x10  Standing Marches with just right lower extremity at rail 2# 20x   Heel Raises - 3x10 at rail  Bridges - 30x  SLR - 2# 20x  Prone extension 2# 20x     Cybex Hamstring Curls 3 x 10 with 3.5 Plates (Added on  10/4)  Cybex Knee Extension 3 x 10 with 2 Plates  (Added on 10/4)     manual therapy techniques: Joint mobilizations, Manual traction, and Myofacial release were applied to the: hip for 0 minutes, including:        neuromuscular re-education activities to improve: Balance, Coordination, Kinesthetic, and Proprioception for 0 minutes. The following activities were included:   LONG ARC QUAD 2# 30x eccentric focus   QUAD SET - 30x 2 second hold       therapeutic activities to improve functional performance for 0 minutes, including:     gait training to improve functional mobility and safety for 0  minutes, including:           Patient Education and Home Exercises      Home Exercises Provided and Patient Education Provided      Education provided:   - AVS     Written Home Exercises Provided: Patient instructed to cont prior HEP. Exercises were reviewed and Francis was able to demonstrate them prior to the end of the session.  Francis demonstrated good  understanding of the education provided. See EMR under Patient Instructions for exercises provided during therapy sessions     ASSESSMENT      Patient is making excellent progress in Therapy. Her strength is improving quickly. Therapist increased weight on Hamstring curls today. Patient is now able to ambulate with 1 crutch on Left side. Patient is almost 5 weeks Post Op and therefore we are still unable to perform Hip Abduction. Will continue to progress patient safely per the protocol.            PMH from evaluation:   Francis is a 71 y.o. female referred to outpatient Physical Therapy with a medical diagnosis of Trochanteric bursitis of right hip. Patient presents with right hip pain due to right endoscopic gluteus medius repair 9/9/2022. Patient wearing hip brace to limit abduction/adduction. No active hip abduction for 8 weeks and no passive hip adduction. Patient is 50% Wb'ing using bilateral crutches for 4-6 weeks. Patient's strength and RANGE OF MOTION is limited.  Patient expressed good understanding of precautions.        Francis Is progressing towards her goals.   Pt prognosis is Good.      Pt will continue to benefit from skilled outpatient physical therapy to address the deficits listed in the problem list box on initial evaluation, provide pt/family education and to maximize pt's level of independence in the home and community environment.      Pt's spiritual, cultural and educational needs considered and pt agreeable to plan of care and goals.     Anticipated barriers to physical therapy: None     Goals:  Short Term Goals: 4 weeks   1. Independent with Home Exercise Program : Met   2. Increase Right Hip Range of Motion to Within Functional Limits : Not met according to restrictions  3. Increase Right Hip Strength to grossly 4/5 : Met  4. Patient will ambulate 500 feet with bilateral crutches with complaints of pain no grater than 2-3/10 : Met     Long Term Goals: 8 weeks   1. Patient will increase Right Hip Strength to grossly 4+/5 :Ongoing  2. Patient will ambulate 1000+ feet with no complaints of pain or weakness in Right Hip : Ongoing     PLAN      Plan of care Certification: 9/12/2022 to 11/7/2022.     Outpatient Physical Therapy 2 times weekly for 8 weeks to include the following interventions: Electrical Stimulation Pre-Mod, Gait Training, Manual Therapy, Moist Heat/ Ice, Neuromuscular Re-ed, Patient Education, Therapeutic Activities, Therapeutic Exercise, and Ultrasound.      Whitney De Paz, PT, DPT  10/13/2022

## 2022-10-18 ENCOUNTER — CLINICAL SUPPORT (OUTPATIENT)
Dept: REHABILITATION | Facility: HOSPITAL | Age: 72
End: 2022-10-18
Payer: MEDICARE

## 2022-10-18 DIAGNOSIS — M70.61 TROCHANTERIC BURSITIS OF RIGHT HIP: Primary | ICD-10-CM

## 2022-10-18 PROCEDURE — 97110 THERAPEUTIC EXERCISES: CPT

## 2022-10-18 NOTE — PROGRESS NOTES
"[facility]   Physical Therapy Treatment Note      Name: Francis Parr  Clinic Number: [id number]     Visit Date: 9/20/2022  Therapy Diagnosis: Right gluteus medius repair  Physician: Elton Kent MD     Physician Orders: PT Eval and Treat right hip  Medical Diagnosis from Referral:  Right gluteus medius repair  Evaluation Date: 9/12/2022  Updated Plan of Care Due : 11/10/2022  Authorization Period Expiration: 9/8/2023  Plan of Care Expiration: 11/7/2022     Visit # / Visits authorized: 10 / 16  PTA Visit #: 2/5      Time In: 11:00 am  Time Out: 12:00 pm  Total Billable Time: 55 minutes     SUBJECTIVE      Pt reports:   She was compliant with home exercise program.  Response to previous treatment: sore  Functional change: None     Pain: 0/10  Location: right groin       Prior Level of Function: Patient was enjoying [employer] and fell going down stairs  Current Level of Function: Patient wearing hip brace and using bilateral crutches    Precautions : No Active Hip Abduction until 11/4/2022        OBJECTIVE      Objective Measures updated at progress report unless specified.      Treatment      Francis received the treatments listed below:       therapeutic exercises to develop strength, endurance, and ROM for 55 minutes including:     NuStep/Bike - 5 minutes on the Bike  SB - 4x20 seconds  Hamstring Stretch on Stairs - right - 4x20 seconds  Quad Stretch 4x20 seconds       Seated:  Hip Add with Ball - 30x3 seconds  Hip flexion    Cybex Hip - right     Flexion   Extension      Cybex Leg Press - Bilateral 6 plates 3 x 10  Leg Press Single - 3 plates 3 x 10  TKE 4 plates 3 x 10  Sit to Stand from Chair - 2 x 10  Standing Marches with just right lower extremity at rail 2# 2 x 10  Heel Raises - 3 x 10 at rail  Bridges - 3 x 10  SLR - 2# 3 x 10  Prone extension 2# 3 x 10  Lateral step downs 2 x 10 4"  Cybex Hamstring Curls 3 x 10 with 4 Plates   Cybex Knee Extension 3 x 10 with 3 Plates  "      manual therapy techniques: Joint mobilizations, Manual traction, and Myofacial release were applied to the: hip for 0 minutes, including:        neuromuscular re-education activities to improve: Balance, Coordination, Kinesthetic, and Proprioception for 0 minutes. The following activities were included:   LONG ARC QUAD 2# 3 x 10 eccentric focus   QUAD SET - 30x 2 second hold       therapeutic activities to improve functional performance for 0 minutes, including:     gait training to improve functional mobility and safety for 0  minutes, including:           Patient Education and Home Exercises      Home Exercises Provided and Patient Education Provided      Education provided:   - AVS     Written Home Exercises Provided: Patient instructed to cont prior HEP. Exercises were reviewed and Francis was able to demonstrate them prior to the end of the session.  Francis demonstrated good  understanding of the education provided. See EMR under Patient Instructions for exercises provided during therapy sessions     ASSESSMENT      Increased weight on leg curls, leg extensions and leg press along with added lateral step downs. Patient is WBAT now and using one crutch most of the time. Patient is making excellent progress in functional strength and endurance. Will continue to progress and wean completely off crutch. Plan to add Cybex hip machine next visit.            PMH from evaluation:   Francis is a 71 y.o. female referred to outpatient Physical Therapy with a medical diagnosis of Trochanteric bursitis of right hip. Patient presents with right hip pain due to right endoscopic gluteus medius repair 9/9/2022. Patient wearing hip brace to limit abduction/adduction. No active hip abduction for 8 weeks and no passive hip adduction. Patient is 50% Wb'ing using bilateral crutches for 4-6 weeks. Patient's strength and RANGE OF MOTION is limited. Patient expressed good understanding of precautions.        Francis Is  progressing towards her goals.   Pt prognosis is Good.      Pt will continue to benefit from skilled outpatient physical therapy to address the deficits listed in the problem list box on initial evaluation, provide pt/family education and to maximize pt's level of independence in the home and community environment.      Pt's spiritual, cultural and educational needs considered and pt agreeable to plan of care and goals.     Anticipated barriers to physical therapy: None     Goals:  Short Term Goals: 4 weeks   1. Independent with Home Exercise Program : Met   2. Increase Right Hip Range of Motion to Within Functional Limits : Not met according to restrictions  3. Increase Right Hip Strength to grossly 4/5 : Met  4. Patient will ambulate 500 feet with bilateral crutches with complaints of pain no grater than 2-3/10 : Met     Long Term Goals: 8 weeks   1. Patient will increase Right Hip Strength to grossly 4+/5 :Ongoing  2. Patient will ambulate 1000+ feet with no complaints of pain or weakness in Right Hip : Ongoing     PLAN      Plan of care Certification: 9/12/2022 to 11/7/2022.     Outpatient Physical Therapy 2 times weekly for 8 weeks to include the following interventions: Electrical Stimulation Pre-Mod, Gait Training, Manual Therapy, Moist Heat/ Ice, Neuromuscular Re-ed, Patient Education, Therapeutic Activities, Therapeutic Exercise, and Ultrasound.      Paul De Paz, PT, MLT    10/18/2022

## 2022-10-20 ENCOUNTER — CLINICAL SUPPORT (OUTPATIENT)
Dept: REHABILITATION | Facility: HOSPITAL | Age: 72
End: 2022-10-20
Payer: MEDICARE

## 2022-10-20 DIAGNOSIS — M70.61 TROCHANTERIC BURSITIS OF RIGHT HIP: Primary | ICD-10-CM

## 2022-10-20 PROCEDURE — 97110 THERAPEUTIC EXERCISES: CPT | Mod: CQ

## 2022-10-20 NOTE — PROGRESS NOTES
"[facility]   Physical Therapy Treatment Note      Name: Francis Parr  Clinic Number: [id number]     Visit Date: 9/20/2022  Therapy Diagnosis: Right gluteus medius repair  Physician: Elton Kent MD     Physician Orders: PT Eval and Treat right hip  Medical Diagnosis from Referral:  Right gluteus medius repair  Evaluation Date: 9/12/2022  Updated Plan of Care Due : 11/10/2022  Authorization Period Expiration: 9/8/2023  Plan of Care Expiration: 11/7/2022     Visit # / Visits authorized: 10 / 16  PTA Visit #: 2/5      Time In: 10:40 am  Time Out: 11:28 am  Total Billable Time: 48 minutes     SUBJECTIVE      Pt reports:   She was compliant with home exercise program.  Response to previous treatment: sore  Functional change: None     Pain: 0/10  Location: right groin       Prior Level of Function: Patient was enjoying [employer] and fell going down stairs  Current Level of Function: Patient wearing hip brace and using bilateral crutches    Precautions : No Active Hip Abduction until 11/4/2022        OBJECTIVE      Objective Measures updated at progress report unless specified.      Treatment      Francis received the treatments listed below:       therapeutic exercises to develop strength, endurance, and ROM for 40 minutes including:     NuStep/Bike - 5 minutes on the Bike  SB - 4x20 seconds  Hamstring Stretch on Stairs - right - 4x20 seconds  Sidelying Clamshell isometrics - 30x  LAQ 4# 30x  Quad Stretch 4x20 seconds       Seated:  Hip Add with Ball - 30x3 seconds Hooklying    Hip flexion    Cybex Hip - right     Flexion   Extension      Cybex Leg Press - Bilateral 6 plates 3 x 10  Leg Press Single - 3 plates 3 x 10  TKE 4 plates 3 x 10  Sit to Stand from Chair - 2 x 10  Standing Marches with just right lower extremity at rail 2# 2 x 10  Heel Raises - 3 x 10 at rail  Bridges - 3 x 10  SLR - 2# 3 x 10  Prone extension 2# 3 x 10  Lateral step downs 2 x 10 4"  Cybex Hamstring Curls 3 x " 10 with 4 Plates   Cybex Knee Extension 3 x 10 with 3 Plates    Hip Abd/Ext @ Cybex - 2 plates 20x ea (L only)     manual therapy techniques: Joint mobilizations, Manual traction, and Myofacial release were applied to the: hip for 0 minutes, including:        neuromuscular re-education activities to improve: Balance, Coordination, Kinesthetic, and Proprioception for 0 minutes. The following activities were included:   LONG ARC QUAD 2# 3 x 10 eccentric focus   QUAD SET - 30x 2 second hold       therapeutic activities to improve functional performance for 0 minutes, including:     gait training to improve functional mobility and safety for 0  minutes, including:           Patient Education and Home Exercises      Home Exercises Provided and Patient Education Provided      Education provided:   - AVS     Written Home Exercises Provided: Patient instructed to cont prior HEP. Exercises were reviewed and Francis was able to demonstrate them prior to the end of the session.  Francis demonstrated good  understanding of the education provided. See EMR under Patient Instructions for exercises provided during therapy sessions     ASSESSMENT      Progressed LE strengthening exercises with fatigue noted. Initiated hip abduction isometrics today with fatigue noted. Pt has normalized gait pattern when ambulating without AD.            PMH from evaluation:   Francis is a 71 y.o. female referred to outpatient Physical Therapy with a medical diagnosis of Trochanteric bursitis of right hip. Patient presents with right hip pain due to right endoscopic gluteus medius repair 9/9/2022. Patient wearing hip brace to limit abduction/adduction. No active hip abduction for 8 weeks and no passive hip adduction. Patient is 50% Wb'ing using bilateral crutches for 4-6 weeks. Patient's strength and RANGE OF MOTION is limited. Patient expressed good understanding of precautions.        Francis Is progressing towards her goals.   Pt prognosis is  Good.      Pt will continue to benefit from skilled outpatient physical therapy to address the deficits listed in the problem list box on initial evaluation, provide pt/family education and to maximize pt's level of independence in the home and community environment.      Pt's spiritual, cultural and educational needs considered and pt agreeable to plan of care and goals.     Anticipated barriers to physical therapy: None     Goals:  Short Term Goals: 4 weeks   1. Independent with Home Exercise Program : Met   2. Increase Right Hip Range of Motion to Within Functional Limits : Not met according to restrictions  3. Increase Right Hip Strength to grossly 4/5 : Met  4. Patient will ambulate 500 feet with bilateral crutches with complaints of pain no grater than 2-3/10 : Met     Long Term Goals: 8 weeks   1. Patient will increase Right Hip Strength to grossly 4+/5 :Ongoing  2. Patient will ambulate 1000+ feet with no complaints of pain or weakness in Right Hip : Ongoing     PLAN      Plan of care Certification: 9/12/2022 to 11/7/2022.     Outpatient Physical Therapy 2 times weekly for 8 weeks to include the following interventions: Electrical Stimulation Pre-Mod, Gait Training, Manual Therapy, Moist Heat/ Ice, Neuromuscular Re-ed, Patient Education, Therapeutic Activities, Therapeutic Exercise, and Ultrasound.      Levon Whitley, PTA    10/20/2022

## 2022-10-25 ENCOUNTER — CLINICAL SUPPORT (OUTPATIENT)
Dept: REHABILITATION | Facility: HOSPITAL | Age: 72
End: 2022-10-25
Payer: MEDICARE

## 2022-10-25 DIAGNOSIS — M70.61 TROCHANTERIC BURSITIS OF RIGHT HIP: Primary | ICD-10-CM

## 2022-10-25 PROCEDURE — 97110 THERAPEUTIC EXERCISES: CPT

## 2022-10-25 NOTE — PROGRESS NOTES
[facility]   Physical Therapy Treatment Note      Name: Francis Parr  Clinic Number: [id number]     Visit Date: 9/20/2022  Therapy Diagnosis: Right gluteus medius repair  Physician: Elton Kent MD     Physician Orders: PT Eval and Treat right hip  Medical Diagnosis from Referral:  Right gluteus medius repair  Evaluation Date: 9/12/2022  Updated Plan of Care Due : 11/10/2022  Authorization Period Expiration: 9/8/2023  Plan of Care Expiration: 11/7/2022     Visit # / Visits authorized: 12 / 20  PTA Visit #: 2/5      Time In: 10:58 am  Time Out: 11:57 am  Total Billable Time: 55 minutes     SUBJECTIVE      Pt reports: She is ready to get rid of the hip brace.  She was compliant with home exercise program.  Response to previous treatment: sore  Functional change: None     Pain: 0/10  Location: right groin       Prior Level of Function: Patient was enjoying longterm and fell going down stairs  Current Level of Function: Patient wearing hip brace and using bilateral crutches    Precautions : No Active Hip Abduction until 11/4/2022        OBJECTIVE      Objective Measures updated at progress report unless specified.      Treatment      Francis received the treatments listed below:       therapeutic exercises to develop strength, endurance, and ROM for 55 minutes including:     NuStep/Bike - 5 minutes on the Bike  SB - 4x20 seconds  Hamstring Stretch on Stairs - right - 4x20 seconds  Sidelying Clamshell isometrics - 30x  LAQ 4# 30x  Quad Stretch 4x20 seconds       Seated:  Hip Add with Ball - 30x3 seconds Hooklying    Hip flexion    Cybex Hip - right     Flexion   Extension      Cybex Leg Press - Bilateral 6 plates 3 x 10  Leg Press Single (Right) - 3 plates 3 x 10  TKE 4 plates 3 x 10  Sit to Stand from Chair - 2 x 10  Standing Marches with just right lower extremity at rail 2# 2 x 10  Heel Raises - 3 x 10 at rail  Bridges - 3 x 10  Hooklying Adduction with Ball - 3 x 10   Hooklying  "Isometric Abduction - 2 x 10 with 5 sec hold with Manual resistance by Therapist  SLR - 3# 2 x 10 (Increased weight on 10/25)  Prone extension 3 x 10 (No weight today)  Lateral step downs 2 x 10 4"  Cybex Hamstring Curls 3 x 10 with 4 Plates   Cybex Knee Extension 3 x 10 with 3 Plates    Hip Abd/Ext @ Cybex - 2 plates 20x ea (L only)     manual therapy techniques: Joint mobilizations, Manual traction, and Myofacial release were applied to the: hip for 0 minutes, including:        neuromuscular re-education activities to improve: Balance, Coordination, Kinesthetic, and Proprioception for 0 minutes. The following activities were included:   LONG ARC QUAD 2# 3 x 10 eccentric focus   QUAD SET - 30x 2 second hold       therapeutic activities to improve functional performance for 0 minutes, including:     gait training to improve functional mobility and safety for 0  minutes, including:           Patient Education and Home Exercises      Home Exercises Provided and Patient Education Provided      Education provided:   - AVS     Written Home Exercises Provided: Patient instructed to cont prior HEP. Exercises were reviewed and Francis was able to demonstrate them prior to the end of the session.  Francis demonstrated good  understanding of the education provided. See EMR under Patient Instructions for exercises provided during therapy sessions     ASSESSMENT      Patient is approx 6 1/2 weeks Post Op today. Per her protocol she is able to Discharge the hip brace today. She is able to do this with no difficulty. We are weaning her off the crutches as well. Her gait is improving quickly. She was able to ambulate throughout the treatment session today without an AD with no complaints of pain or discomfort. Increased weight from 2 pounds to 3 pounds with SLRs. She is progressing very well and healing quickly. Will continue with the current Plan of Care per MD protocol. Sup Visit performed today with HARRIS Mackey and " HARRIS Castro.  All goals and treatment plan reviewed. Will work toward completion of all goals set.                PMH from evaluation:   Francis is a 71 y.o. female referred to outpatient Physical Therapy with a medical diagnosis of Trochanteric bursitis of right hip. Patient presents with right hip pain due to right endoscopic gluteus medius repair 9/9/2022. Patient wearing hip brace to limit abduction/adduction. No active hip abduction for 8 weeks and no passive hip adduction. Patient is 50% Wb'ing using bilateral crutches for 4-6 weeks. Patient's strength and RANGE OF MOTION is limited. Patient expressed good understanding of precautions.        Francis Is progressing towards her goals.   Pt prognosis is Good.      Pt will continue to benefit from skilled outpatient physical therapy to address the deficits listed in the problem list box on initial evaluation, provide pt/family education and to maximize pt's level of independence in the home and community environment.      Pt's spiritual, cultural and educational needs considered and pt agreeable to plan of care and goals.     Anticipated barriers to physical therapy: None     Goals:  Short Term Goals: 4 weeks   1. Independent with Home Exercise Program : Met   2. Increase Right Hip Range of Motion to Within Functional Limits : Not met according to restrictions  3. Increase Right Hip Strength to grossly 4/5 : Met  4. Patient will ambulate 500 feet with bilateral crutches with complaints of pain no grater than 2-3/10 : Met     Long Term Goals: 8 weeks   1. Patient will increase Right Hip Strength to grossly 4+/5 :Ongoing  2. Patient will ambulate 1000+ feet with no complaints of pain or weakness in Right Hip : Ongoing     PLAN      Plan of care Certification: 9/12/2022 to 11/7/2022.     Outpatient Physical Therapy 2 times weekly for 8 weeks to include the following interventions: Electrical Stimulation Pre-Mod, Gait Training, Manual Therapy, Moist Heat/ Ice,  Neuromuscular Re-ed, Patient Education, Therapeutic Activities, Therapeutic Exercise, and Ultrasound.      Whitney De Paz, PT, DPT    10/25/2022

## 2022-11-01 ENCOUNTER — CLINICAL SUPPORT (OUTPATIENT)
Dept: REHABILITATION | Facility: HOSPITAL | Age: 72
End: 2022-11-01
Payer: MEDICARE

## 2022-11-01 DIAGNOSIS — M70.61 TROCHANTERIC BURSITIS OF RIGHT HIP: Primary | ICD-10-CM

## 2022-11-01 PROCEDURE — 97140 MANUAL THERAPY 1/> REGIONS: CPT | Mod: CQ

## 2022-11-01 PROCEDURE — 97010 HOT OR COLD PACKS THERAPY: CPT | Mod: CQ

## 2022-11-01 NOTE — PROGRESS NOTES
"[facility]   Physical Therapy Treatment Note      Name: Francis Parr  Clinic Number: [id number]     Visit Date: 9/20/2022  Therapy Diagnosis: Right gluteus medius repair  Physician: Elton Kent MD     Physician Orders: PT Eval and Treat right hip  Medical Diagnosis from Referral:  Right gluteus medius repair  Evaluation Date: 9/12/2022  Updated Plan of Care Due : 11/10/2022  Authorization Period Expiration: 9/8/2023  Plan of Care Expiration: 11/7/2022     Visit # / Visits authorized: 13 / 20  PTA Visit #: 1/5      Time In: 11:26 am  Time Out: 12:06 pm  Total Billable Time: 40 minutes     SUBJECTIVE      Pt reports: she has had increased pain in her posterior hip the past 5 days with "electrical shock" pain noted   She was compliant with home exercise program.  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      Treatment      Francis received the treatments listed below:       therapeutic exercises to develop strength, endurance, and ROM for 0 minutes including:     NuStep/Bike - 5 minutes on the Bike  SB - 4x20 seconds  Hamstring Stretch on Stairs - right - 4x20 seconds  Sidelying Clamshell isometrics - 30x  LAQ 4# 30x  Quad Stretch 4x20 seconds       Seated:  Hip Add with Ball - 30x3 seconds Hooklying    Hip flexion    Cybex Hip - right     Flexion   Extension      Cybex Leg Press - Bilateral 6 plates 3 x 10  Leg Press Single (Right) - 3 plates 3 x 10  TKE 4 plates 3 x 10  Sit to Stand from Chair - 2 x 10  Standing Marches with just right lower extremity at rail 2# 2 x 10  Heel Raises - 3 x 10 at rail  Bridges - 3 " "x 10  Hooklying Adduction with Ball - 3 x 10   Hooklying Isometric Abduction - 2 x 10 with 5 sec hold with Manual resistance by Therapist  SLR - 3# 2 x 10 (Increased weight on 10/25)  Prone extension 3 x 10 (No weight today)  Lateral step downs 2 x 10 4"  Cybex Hamstring Curls 3 x 10 with 4 Plates   Cybex Knee Extension 3 x 10 with 3 Plates    Hip Abd/Ext @ Cybex - 2 plates 20x ea (L only)     manual therapy techniques: Joint mobilizations, Manual traction, and Myofacial release were applied to the: hip for 30 minutes, including:   Hip PROM  Glute Stretch  GENTLE piriformis stretch  HS Stretch     neuromuscular re-education activities to improve: Balance, Coordination, Kinesthetic, and Proprioception for 0 minutes. The following activities were included:   LONG ARC QUAD 2# 3 x 10 eccentric focus   QUAD SET - 30x 2 second hold           MHP x 10 minutes        therapeutic activities to improve functional performance for 0 minutes, including:     gait training to improve functional mobility and safety for 0  minutes, including:           Patient Education and Home Exercises      Home Exercises Provided and Patient Education Provided      Education provided:   - AVS     Written Home Exercises Provided: Patient instructed to cont prior HEP. Exercises were reviewed and Francis was able to demonstrate them prior to the end of the session.  Francis demonstrated good  understanding of the education provided. See EMR under Patient Instructions for exercises provided during therapy sessions     ASSESSMENT      Pt has increased pain when WB. Pt is able to isometrically and concentrically contract her hib abductors with no pain noted. Pt has some pain in posterior hip with SLR and no pain with prone SLR. Pt has increased pain when attempting to stretch hamstrings and is TTP around ischial tuberosity with some improvement in symptoms with manual massage. Pt able to tolerate proximal hamstring stretching and after this was able " to tolerate distal hamstring stretching as well. Big improvement in SLR form with no pain during this after stretching and pt's gait pattern/standing tolerance was much better was well. Issued pt HEP to work on stretching diligently until next F/U appt. Pt has MD appt. This week as well. Ended session with MHP to ischial tuberosity.             PMH from evaluation:   Francis is a 71 y.o. female referred to outpatient Physical Therapy with a medical diagnosis of Trochanteric bursitis of right hip. Patient presents with right hip pain due to right endoscopic gluteus medius repair 9/9/2022. Patient wearing hip brace to limit abduction/adduction. No active hip abduction for 8 weeks and no passive hip adduction. Patient is 50% Wb'ing using bilateral crutches for 4-6 weeks. Patient's strength and RANGE OF MOTION is limited. Patient expressed good understanding of precautions.        Francis Is progressing towards her goals.   Pt prognosis is Good.      Pt will continue to benefit from skilled outpatient physical therapy to address the deficits listed in the problem list box on initial evaluation, provide pt/family education and to maximize pt's level of independence in the home and community environment.      Pt's spiritual, cultural and educational needs considered and pt agreeable to plan of care and goals.     Anticipated barriers to physical therapy: None     Goals:  Short Term Goals: 4 weeks   1. Independent with Home Exercise Program : Met   2. Increase Right Hip Range of Motion to Within Functional Limits : Not met according to restrictions  3. Increase Right Hip Strength to grossly 4/5 : Met  4. Patient will ambulate 500 feet with bilateral crutches with complaints of pain no grater than 2-3/10 : Met     Long Term Goals: 8 weeks   1. Patient will increase Right Hip Strength to grossly 4+/5 :Ongoing  2. Patient will ambulate 1000+ feet with no complaints of pain or weakness in Right Hip : Ongoing     PLAN      Plan  of care Certification: 9/12/2022 to 11/7/2022.     Outpatient Physical Therapy 2 times weekly for 8 weeks to include the following interventions: Electrical Stimulation Pre-Mod, Gait Training, Manual Therapy, Moist Heat/ Ice, Neuromuscular Re-ed, Patient Education, Therapeutic Activities, Therapeutic Exercise, and Ultrasound.      Froilan Whitley, PTA     11/1/2022

## 2022-11-04 ENCOUNTER — CLINICAL SUPPORT (OUTPATIENT)
Dept: REHABILITATION | Facility: HOSPITAL | Age: 72
End: 2022-11-04
Payer: MEDICARE

## 2022-11-04 DIAGNOSIS — M70.61 TROCHANTERIC BURSITIS OF RIGHT HIP: Primary | ICD-10-CM

## 2022-11-04 PROCEDURE — 97110 THERAPEUTIC EXERCISES: CPT

## 2022-11-04 NOTE — PROGRESS NOTES
[facility]   Physical Therapy Treatment Note      Name: Francis Parr  Clinic Number: [id number]     Visit Date: 9/20/2022  Therapy Diagnosis: Right gluteus medius repair  Physician: Elton Kent MD     Physician Orders: PT Eval and Treat right hip  Medical Diagnosis from Referral:  Right gluteus medius repair  Evaluation Date: 9/12/2022  Updated Plan of Care Due : 11/10/2022  Authorization Period Expiration: 9/8/2023  Plan of Care Expiration: 11/10/2022     Visit # / Visits authorized: 14 / 20  PTA Visit #: 1/5      Time In: 10:05 am  Time Out: 11:00 am  Total Billable Time: 53 minutes     SUBJECTIVE      Pt reports: Decreased pain and started Medrol dose pack. Patient saw her surgeon yesterday.  She was compliant with home exercise program.  Response to previous treatment: sore  Functional change: None     Pain: 0/10  Location: right groin       Prior Level of Function: Patient was enjoying [facility]CC and fell going down stairs  Current Level of Function: Patient wearing hip brace and using bilateral crutches    Precautions : Standard        OBJECTIVE      Objective Measures updated at progress report unless specified.      Treatment      Francis received the treatments listed below:       therapeutic exercises to develop strength, endurance, and ROM for 53 minutes including:     NuStep/Bike - 5 minutes on the Bike  SB - 4x20 seconds  Hamstring Stretch on Stairs - right - 4x20 seconds  Quad Stretch 4x20 seconds    Cybex Leg Press - Bilateral 6 plates 3 x 10  Leg Press Single (Right) - 3 plates 3 x 10  TKE 4 plates 3 x 10  Sit to Stand from Chair - 2 x 10  Standing Marches with just right lower extremity at rail 2# 2 x 10 NOT PERFORMED   Heel Raises - 3 x 10 at stairs  Bridges - 3 x 10 NOT PERFORMED   Hooklying Adduction with Ball - 3 x 10 NOT PERFORMED   Hooklying Isometric Abduction - 2 x 10 with 5 sec hold with Manual resistance by Therapist NOT PERFORMED   SLR - 3# 2 x 10  "(Increased weight on 10/25) NOT PERFORMED   Prone extension 3 x 10 (No weight today) NOT PERFORMED   Lateral step downs 2 x 10 4"  Cybex Hamstring Curls 3 x 10 with 4 Plates   Cybex Knee Extension 3 x 10 with 3 Plates    Cybex abduction- 2 x 10 1 plate   Cybex hip extension 2 x 10 1 plate  Cybex hip flexion 2 x 10 1 plate    manual therapy techniques: Joint mobilizations, Manual traction, and Myofacial release were applied to the: hip for 0 minutes, including:   Hip PROM  Glute Stretch  GENTLE piriformis stretch  HS Stretch     neuromuscular re-education activities to improve: Balance, Coordination, Kinesthetic, and Proprioception for 0 minutes. The following activities were included:   LONG ARC QUAD 2# 3 x 10 eccentric focus   QUAD SET - 30x 2 second hold           MHP x 0 minutes        therapeutic activities to improve functional performance for 0 minutes, including:     gait training to improve functional mobility and safety for 0  minutes, including:           Patient Education and Home Exercises      Home Exercises Provided and Patient Education Provided      Education provided:   - AVS     Written Home Exercises Provided: Patient instructed to cont prior HEP. Exercises were reviewed and Francis was able to demonstrate them prior to the end of the session.  Francis demonstrated good  understanding of the education provided. See EMR under Patient Instructions for exercises provided during therapy sessions     ASSESSMENT      Patient was feeling much better today and saw her surgeon yesterday. Patient was given Medrol dose pack and cleared to resume strengthening exercises with active hip abduction. Patient did well with Cybex hip 3-way and was able to perform all exercises without difficulty or increased pain. Instructed patient to not over do it over weekend and take couple days off from working in yard. Will advance patient's strengthening as tolerated.       PMH from evaluation:   Francis is a 71 y.o. female " referred to outpatient Physical Therapy with a medical diagnosis of Trochanteric bursitis of right hip. Patient presents with right hip pain due to right endoscopic gluteus medius repair 9/9/2022. Patient wearing hip brace to limit abduction/adduction. No active hip abduction for 8 weeks and no passive hip adduction. Patient is 50% Wb'ing using bilateral crutches for 4-6 weeks. Patient's strength and RANGE OF MOTION is limited. Patient expressed good understanding of precautions.        Francis Is progressing towards her goals.   Pt prognosis is Good.      Pt will continue to benefit from skilled outpatient physical therapy to address the deficits listed in the problem list box on initial evaluation, provide pt/family education and to maximize pt's level of independence in the home and community environment.      Pt's spiritual, cultural and educational needs considered and pt agreeable to plan of care and goals.     Anticipated barriers to physical therapy: None     Goals:  Short Term Goals: 4 weeks   1. Independent with Home Exercise Program : Met   2. Increase Right Hip Range of Motion to Within Functional Limits : Not met according to restrictions  3. Increase Right Hip Strength to grossly 4/5 : Met  4. Patient will ambulate 500 feet with bilateral crutches with complaints of pain no grater than 2-3/10 : Met     Long Term Goals: 8 weeks   1. Patient will increase Right Hip Strength to grossly 4+/5 :Ongoing  2. Patient will ambulate 1000+ feet with no complaints of pain or weakness in Right Hip : Ongoing     PLAN      Plan of care Certification: 9/12/2022 to 11/10/2022.     Outpatient Physical Therapy 2 times weekly for 8 weeks to include the following interventions: Electrical Stimulation Pre-Mod, Gait Training, Manual Therapy, Moist Heat/ Ice, Neuromuscular Re-ed, Patient Education, Therapeutic Activities, Therapeutic Exercise, and Ultrasound.      REBEKA BERG, PT, MLT    11/4/2022

## 2022-11-09 ENCOUNTER — HOSPITAL ENCOUNTER (OUTPATIENT)
Dept: RADIOLOGY | Facility: HOSPITAL | Age: 72
Discharge: HOME | End: 2022-11-09
Payer: MEDICARE

## 2022-11-09 DIAGNOSIS — Z12.31 VISIT FOR SCREENING MAMMOGRAM: ICD-10-CM

## 2022-11-09 PROCEDURE — 77067 SCR MAMMO BI INCL CAD: CPT | Mod: TC

## 2022-11-09 PROCEDURE — 77067 MAMMO DIGITAL SCREENING BILAT: ICD-10-PCS | Mod: 26,,, | Performed by: RADIOLOGY

## 2022-11-09 PROCEDURE — 77067 SCR MAMMO BI INCL CAD: CPT | Mod: 26,,, | Performed by: RADIOLOGY

## 2022-11-10 ENCOUNTER — CLINICAL SUPPORT (OUTPATIENT)
Dept: REHABILITATION | Facility: HOSPITAL | Age: 72
End: 2022-11-10
Payer: MEDICARE

## 2022-11-10 DIAGNOSIS — M70.61 TROCHANTERIC BURSITIS OF RIGHT HIP: Primary | ICD-10-CM

## 2022-11-10 PROCEDURE — 97140 MANUAL THERAPY 1/> REGIONS: CPT | Mod: CQ

## 2022-11-10 PROCEDURE — 97110 THERAPEUTIC EXERCISES: CPT | Mod: CQ

## 2022-11-10 NOTE — PROGRESS NOTES
"[facility]   Physical Therapy Treatment Note      Name: Francis Parr  Clinic Number: [id number]     Visit Date: 9/20/2022  Therapy Diagnosis: Right gluteus medius repair  Physician: Elton Kent MD     Physician Orders: PT Eval and Treat right hip  Medical Diagnosis from Referral:  Right gluteus medius repair  Evaluation Date: 9/12/2022  Updated Plan of Care Due : 11/10/2022  Authorization Period Expiration: 9/8/2023  Plan of Care Expiration: 11/10/2022     Visit # / Visits authorized: 15 / 20  PTA Visit #: 2/5      Time In: 11:35 am  Time Out: 12:15 Pm  Total Billable Time: 40 minutes     SUBJECTIVE      Pt reports: dose pack has worn off with slight return of symptoms. Worse when bending at her hips  She was compliant with home exercise program.  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      Treatment      Francis received the treatments listed below:       therapeutic exercises to develop strength, endurance, and ROM for 30 minutes including:     Bike - 5 minutes on the Bike  SB - 4x20 seconds  Hamstring Stretch on Stairs - right - 4x20 seconds  Quad Stretch 4x20 seconds    Cybex Leg Press - Bilateral 6 plates 3 x 10  Leg Press Single (Right) - 3 plates 3 x 10  TKE 4 plates 3 x 10  Standing Marches with just right lower extremity at rail 2# 2 x 10 NOT PERFORMED   Heel Raises - 3 x 10 at stairs  Bridges - 3 x 10 NOT PERFORMED   SLR - 3# 2 x 10   SL Abduction 3x10 2#  Prone extension 3 x 10 2#  Lateral step downs 2 x 10 4"  Cybex Hamstring Curls 3 x 10 with 4 Plates   Cybex Knee Extension 3 x 10 with 3 Plates    Cybex abduction- 2 x 10 1 " plate   Cybex hip extension 2 x 10 1 plate  Cybex hip flexion 2 x 10 1 plate    manual therapy techniques: Joint mobilizations, Manual traction, and Myofacial release were applied to the: hip for 10 minutes, including:   Hip PROM  Glute Stretch  piriformis stretch  HS Stretch     neuromuscular re-education activities to improve: Balance, Coordination, Kinesthetic, and Proprioception for 0 minutes. The following activities were included:   LONG ARC QUAD 2# 3 x 10 eccentric focus   QUAD SET - 30x 2 second hold           MHP x 0 minutes        therapeutic activities to improve functional performance for 0 minutes, including:     gait training to improve functional mobility and safety for 0  minutes, including:           Patient Education and Home Exercises      Home Exercises Provided and Patient Education Provided      Education provided:   - AVS     Written Home Exercises Provided: Patient instructed to cont prior HEP. Exercises were reviewed and Francis was able to demonstrate them prior to the end of the session.  Francis demonstrated good  understanding of the education provided. See EMR under Patient Instructions for exercises provided during therapy sessions     ASSESSMENT      Pt tolerated all therapeutic exercises and manual ROM today with min. C/o pain noted. Pt has some pain in posterolateral hip during transitional movements. Pt has good quad control with SLR and no pain with resisted hip abduction. Progressed strengthening exercises per pt tolerance.        PMH from evaluation:   Francis is a 71 y.o. female referred to outpatient Physical Therapy with a medical diagnosis of Trochanteric bursitis of right hip. Patient presents with right hip pain due to right endoscopic gluteus medius repair 9/9/2022. Patient wearing hip brace to limit abduction/adduction. No active hip abduction for 8 weeks and no passive hip adduction. Patient is 50% Wb'ing using bilateral crutches for 4-6 weeks. Patient's strength and  RANGE OF MOTION is limited. Patient expressed good understanding of precautions.        Francis Is progressing towards her goals.   Pt prognosis is Good.      Pt will continue to benefit from skilled outpatient physical therapy to address the deficits listed in the problem list box on initial evaluation, provide pt/family education and to maximize pt's level of independence in the home and community environment.      Pt's spiritual, cultural and educational needs considered and pt agreeable to plan of care and goals.     Anticipated barriers to physical therapy: None     Goals:  Short Term Goals: 4 weeks   1. Independent with Home Exercise Program : Met   2. Increase Right Hip Range of Motion to Within Functional Limits : Not met according to restrictions  3. Increase Right Hip Strength to grossly 4/5 : Met  4. Patient will ambulate 500 feet with bilateral crutches with complaints of pain no grater than 2-3/10 : Met     Long Term Goals: 8 weeks   1. Patient will increase Right Hip Strength to grossly 4+/5 :Ongoing  2. Patient will ambulate 1000+ feet with no complaints of pain or weakness in Right Hip : Ongoing     PLAN      Plan of care Certification: 9/12/2022 to 11/10/2022.     Outpatient Physical Therapy 2 times weekly for 8 weeks to include the following interventions: Electrical Stimulation Pre-Mod, Gait Training, Manual Therapy, Moist Heat/ Ice, Neuromuscular Re-ed, Patient Education, Therapeutic Activities, Therapeutic Exercise, and Ultrasound.      Froilan Whitley PTA,    11/10/2022

## 2022-11-14 ENCOUNTER — CLINICAL SUPPORT (OUTPATIENT)
Dept: FAMILY MEDICINE | Facility: CLINIC | Age: 72
End: 2022-11-14
Payer: MEDICARE

## 2022-11-14 DIAGNOSIS — Z23 FLU VACCINE NEED: Primary | ICD-10-CM

## 2022-11-14 PROCEDURE — 90694 FLU VACCINE - QUADRIVALENT - ADJUVANTED: ICD-10-PCS | Mod: ,,, | Performed by: FAMILY MEDICINE

## 2022-11-14 PROCEDURE — 90694 VACC AIIV4 NO PRSRV 0.5ML IM: CPT | Mod: ,,, | Performed by: FAMILY MEDICINE

## 2022-11-14 PROCEDURE — G0008 ADMIN INFLUENZA VIRUS VAC: HCPCS | Mod: ,,, | Performed by: FAMILY MEDICINE

## 2022-11-14 PROCEDURE — G0008 FLU VACCINE - QUADRIVALENT - ADJUVANTED: ICD-10-PCS | Mod: ,,, | Performed by: FAMILY MEDICINE

## 2022-11-15 ENCOUNTER — CLINICAL SUPPORT (OUTPATIENT)
Dept: REHABILITATION | Facility: HOSPITAL | Age: 72
End: 2022-11-15
Payer: MEDICARE

## 2022-11-15 DIAGNOSIS — M70.61 TROCHANTERIC BURSITIS OF RIGHT HIP: Primary | ICD-10-CM

## 2022-11-15 PROCEDURE — 97140 MANUAL THERAPY 1/> REGIONS: CPT | Mod: KX

## 2022-11-15 PROCEDURE — 97110 THERAPEUTIC EXERCISES: CPT | Mod: KX

## 2022-11-15 NOTE — PLAN OF CARE
"[facility]   Physical Therapy Updated PLAN OF CARE      Name: Francis Parr  Clinic Number: [id number]     Visit Date: 9/20/2022  Therapy Diagnosis: Right gluteus medius repair  Physician: Elton Kent MD     Physician Orders: PT Eval and Treat right hip  Medical Diagnosis from Referral:  Right gluteus medius repair  Evaluation Date: 9/12/2022  Updated Plan of Care Due : 12/14/2022  Authorization Period Expiration: 9/8/2023  Plan of Care Expiration: 11/10/2022     Visit # / Visits authorized: 16 / 20  PTA Visit #: 2/5      Time In: 11:02 am  Time Out: 12:00 pm  Total Billable Time:  minutes     SUBJECTIVE      Pt reports: "catching" in right hip that is very painful  She was compliant with home exercise program.  Response to previous treatment: sore  Functional change: None     Pain: 0/10 up to 9/10 at times  Location: right groin       Prior Level of Function: Patient was enjoying intermediate and fell going down stairs  Current Level of Function: Patient wearing hip brace and using bilateral crutches    Precautions : Standard        OBJECTIVE      Objective Measures updated at progress report unless specified.      Treatment      Francis received the treatments listed below:       therapeutic exercises to develop strength, endurance, and ROM for 40 minutes including:     Bike - 5 minutes on the Bike  SB - 4x20 seconds  Hamstring Stretch on Stairs - right - 4x20 seconds  Quad Stretch 4x20 seconds    Cybex Leg Press - Bilateral 6 plates 3 x 10  Leg Press Single (Right) - 3 plates 3 x 10  TKE 4 plates 3 x 10  Standing Marches with just right lower extremity at rail 2# 2 x 10 NOT PERFORMED   Heel Raises - 3 x 10 at stairs  Bridges - 3 x 10 NOT PERFORMED   SLR - 3# 2 x 10 NOT PERFORMED   SL Abduction 3x10 2# NOT PERFORMED   Prone extension 3 x 10 2# NOT PERFORMED   Forward step ups 3 x 10 6"  Lateral step downs 2 x 10 4"  Cybex Hamstring Curls 3 x 10 with 4 Plates   Cybex Knee " Extension 3 x 10 with 3 Plates    Cybex abduction- 2 x 10 2 plate   Cybex hip extension 2 x 10 2 plate  Cybex hip flexion 2 x 10 1 plate    manual therapy techniques: Joint mobilizations, Manual traction, and Myofacial release were applied to the: hip for 15 minutes, including:   Hip PROM  Glute Stretch  piriformis stretch  HS Stretch  Graston Technique with GT 4   Kylie's stretch     neuromuscular re-education activities to improve: Balance, Coordination, Kinesthetic, and Proprioception for 0 minutes. The following activities were included:   LONG ARC QUAD 2# 3 x 10 eccentric focus   QUAD SET - 30x 2 second hold           MHP x 0 minutes        therapeutic activities to improve functional performance for 0 minutes, including:     gait training to improve functional mobility and safety for 0  minutes, including:           Patient Education and Home Exercises      Home Exercises Provided and Patient Education Provided      Education provided:   - AVS     Written Home Exercises Provided: Patient instructed to cont prior HEP. Exercises were reviewed and Francis was able to demonstrate them prior to the end of the session.  Francis demonstrated good  understanding of the education provided. See EMR under Patient Instructions for exercises provided during therapy sessions     ASSESSMENT      Patient has met all STG's except pain due to recent flare. Patient has had couple set backs due to increased pain after discharge of brace and full Wb'ing. Patient is very active and has tendency to do too much in yard and around house. Feel patient has increased inflammation at insertion of gluteus minimus at greater trochanter that is causing her a lot of pain when she transitions from sitting to standing. Performed Graston today to right gluteus minimus to insertion. Patient requires continued therapeutic intervention and may need to speak with her surgeon about possible injection for right hip.       PMH from evaluation:   Francis  is a 71 y.o. female referred to outpatient Physical Therapy with a medical diagnosis of Trochanteric bursitis of right hip. Patient presents with right hip pain due to right endoscopic gluteus medius repair 9/9/2022. Patient wearing hip brace to limit abduction/adduction. No active hip abduction for 8 weeks and no passive hip adduction. Patient is 50% Wb'ing using bilateral crutches for 4-6 weeks. Patient's strength and RANGE OF MOTION is limited. Patient expressed good understanding of precautions.        Francis Is progressing towards her goals.   Pt prognosis is Good.      Pt will continue to benefit from skilled outpatient physical therapy to address the deficits listed in the problem list box on initial evaluation, provide pt/family education and to maximize pt's level of independence in the home and community environment.      Pt's spiritual, cultural and educational needs considered and pt agreeable to plan of care and goals.     Anticipated barriers to physical therapy: None     Goals:  Short Term Goals: 4 weeks   1. Independent with Home Exercise Program : Met   2. Increase Right Hip Range of Motion to Within Functional Limits : Met  3. Increase Right Hip Strength to grossly 4/5 : Met  4. Patient will ambulate 500 feet with bilateral crutches with complaints of pain no grater than 2-3/10 : Not met currently due to recent flare up     Long Term Goals: 10 weeks   1. Patient will increase Right Hip Strength to grossly 4+/5 :Ongoing  2. Patient will ambulate 1000+ feet with no complaints of pain or weakness in Right Hip : Ongoing    Reasons for Recertification of Therapy:   Patient has met all STG's except pain due to recent flare. Patient has had couple set backs due to increased pain after discharge of brace and full Wb'ing. Patient is very active and has tendency to do too much in yard and around house. Feel patient has increased inflammation at insertion of gluteus minimus at greater trochanter that is  causing her a lot of pain when she transitions from sitting to standing. Performed Graston today to right gluteus minimus to insertion. Patient requires continued therapeutic intervention and may need to speak with her surgeon about possible injection for right hip.    Plan     Updated Certification Period: 11/15/2022 to 12/14/2022  Recommended Treatment Plan: 2 times per week for 4 weeks: Electrical Stimulation Pre-Mod, Gait Training, Manual Therapy, Moist Heat/ Ice, Neuromuscular Re-ed, Patient Education, Therapeutic Activities, Therapeutic Exercise, and Ultrasound    Other Recommendations: Possible cortisone injection    REBEKA BERG, PT, MLT  11/15/2022      I CERTIFY THE NEED FOR THESE SERVICES FURNISHED UNDER THIS PLAN OF TREATMENT AND WHILE UNDER MY CARE.    Physician's comments:      Physician's Signature: ___________________________________________________

## 2022-11-15 NOTE — PROGRESS NOTES
"[facility]   Physical Therapy Treatment Note      Name: Francis Parr  Clinic Number: [id number]     Visit Date: 9/20/2022  Therapy Diagnosis: Right gluteus medius repair  Physician: Elton Kent MD     Physician Orders: PT Eval and Treat right hip  Medical Diagnosis from Referral:  Right gluteus medius repair  Evaluation Date: 9/12/2022  Updated Plan of Care Due : 12/14/2022  Authorization Period Expiration: 9/8/2023  Plan of Care Expiration: 11/10/2022     Visit # / Visits authorized: 16 / 20  PTA Visit #: 2/5      Time In: 11:02 am  Time Out: 12:00 pm  Total Billable Time:  minutes     SUBJECTIVE      Pt reports: "catching" in right hip that is very painful  She was compliant with home exercise program.  Response to previous treatment: sore  Functional change: None     Pain: 0/10 up to 9/10 at times  Location: right groin       Prior Level of Function: Patient was enjoying [facility] and fell going down stairs  Current Level of Function: Patient wearing hip brace and using bilateral crutches    Precautions : Standard        OBJECTIVE      Objective Measures updated at progress report unless specified.      Treatment      Francis received the treatments listed below:       therapeutic exercises to develop strength, endurance, and ROM for 40 minutes including:     Bike - 5 minutes on the Bike  SB - 4x20 seconds  Hamstring Stretch on Stairs - right - 4x20 seconds  Quad Stretch 4x20 seconds    Cybex Leg Press - Bilateral 6 plates 3 x 10  Leg Press Single (Right) - 3 plates 3 x 10  TKE 4 plates 3 x 10  Standing Marches with just right lower extremity at rail 2# 2 x 10 NOT PERFORMED   Heel Raises - 3 x 10 at stairs  Bridges - 3 x 10 NOT PERFORMED   SLR - 3# 2 x 10 NOT PERFORMED   SL Abduction 3x10 2# NOT PERFORMED   Prone extension 3 x 10 2# NOT PERFORMED   Forward step ups 3 x 10 6"  Lateral step downs 2 x 10 4"  Cybex Hamstring Curls 3 x 10 with 4 Plates   Cybex Knee Extension 3 x " 10 with 3 Plates    Cybex abduction- 2 x 10 2 plate   Cybex hip extension 2 x 10 2 plate  Cybex hip flexion 2 x 10 1 plate    manual therapy techniques: Joint mobilizations, Manual traction, and Myofacial release were applied to the: hip for 15 minutes, including:   Hip PROM  Glute Stretch  piriformis stretch  HS Stretch  Graston Technique with GT 4   Kylie's stretch     neuromuscular re-education activities to improve: Balance, Coordination, Kinesthetic, and Proprioception for 0 minutes. The following activities were included:   LONG ARC QUAD 2# 3 x 10 eccentric focus   QUAD SET - 30x 2 second hold           MHP x 0 minutes        therapeutic activities to improve functional performance for 0 minutes, including:     gait training to improve functional mobility and safety for 0  minutes, including:           Patient Education and Home Exercises      Home Exercises Provided and Patient Education Provided      Education provided:   - AVS     Written Home Exercises Provided: Patient instructed to cont prior HEP. Exercises were reviewed and Francis was able to demonstrate them prior to the end of the session.  Francis demonstrated good  understanding of the education provided. See EMR under Patient Instructions for exercises provided during therapy sessions     ASSESSMENT      Patient has met all STG's except pain due to recent flare. Patient has had couple set backs due to increased pain after discharge of brace and full Wb'ing. Patient is very active and has tendency to do too much in yard and around house. Feel patient has increased inflammation at insertion of gluteus minimus at greater trochanter that is causing her a lot of pain when she transitions from sitting to standing. Performed Graston today to right gluteus minimus to insertion. Patient requires continued therapeutic intervention and may need to speak with her surgeon about possible injection for right hip.       PMH from evaluation:   Francis is a 71 y.o.  female referred to outpatient Physical Therapy with a medical diagnosis of Trochanteric bursitis of right hip. Patient presents with right hip pain due to right endoscopic gluteus medius repair 9/9/2022. Patient wearing hip brace to limit abduction/adduction. No active hip abduction for 8 weeks and no passive hip adduction. Patient is 50% Wb'ing using bilateral crutches for 4-6 weeks. Patient's strength and RANGE OF MOTION is limited. Patient expressed good understanding of precautions.        Francis Is progressing towards her goals.   Pt prognosis is Good.      Pt will continue to benefit from skilled outpatient physical therapy to address the deficits listed in the problem list box on initial evaluation, provide pt/family education and to maximize pt's level of independence in the home and community environment.      Pt's spiritual, cultural and educational needs considered and pt agreeable to plan of care and goals.     Anticipated barriers to physical therapy: None     Goals:  Short Term Goals: 4 weeks   1. Independent with Home Exercise Program : Met   2. Increase Right Hip Range of Motion to Within Functional Limits : Met  3. Increase Right Hip Strength to grossly 4/5 : Met  4. Patient will ambulate 500 feet with bilateral crutches with complaints of pain no grater than 2-3/10 : Not met currently due to recent flare up     Long Term Goals: 10 weeks   1. Patient will increase Right Hip Strength to grossly 4+/5 :Ongoing  2. Patient will ambulate 1000+ feet with no complaints of pain or weakness in Right Hip : Ongoing     PLAN      Plan of care Certification: 9/12/2022 to 11/10/2022.     Outpatient Physical Therapy 2 times weekly for 8 weeks to include the following interventions: Electrical Stimulation Pre-Mod, Gait Training, Manual Therapy, Moist Heat/ Ice, Neuromuscular Re-ed, Patient Education, Therapeutic Activities, Therapeutic Exercise, and Ultrasound.      REBEKA BERG, PT, MLT    11/15/2022

## 2022-11-17 ENCOUNTER — CLINICAL SUPPORT (OUTPATIENT)
Dept: REHABILITATION | Facility: HOSPITAL | Age: 72
End: 2022-11-17
Payer: MEDICARE

## 2022-11-17 DIAGNOSIS — M70.61 TROCHANTERIC BURSITIS OF RIGHT HIP: Primary | ICD-10-CM

## 2022-11-17 PROCEDURE — 97110 THERAPEUTIC EXERCISES: CPT

## 2022-11-17 PROCEDURE — 97035 APP MDLTY 1+ULTRASOUND EA 15: CPT

## 2022-11-17 PROCEDURE — 97140 MANUAL THERAPY 1/> REGIONS: CPT

## 2022-11-17 NOTE — PROGRESS NOTES
"[facility]   Physical Therapy Treatment Note      Name: Francis Parr  Clinic Number: [id number]     Visit Date: 9/20/2022  Therapy Diagnosis: Right gluteus medius repair  Physician: Elton Kent MD     Physician Orders: PT Eval and Treat right hip  Medical Diagnosis from Referral:  Right gluteus medius repair  Evaluation Date: 9/12/2022  Updated Plan of Care Due : 12/14/2022  Authorization Period Expiration: 9/8/2023  Plan of Care Expiration: 11/10/2022     Visit # / Visits authorized: 17 / 20  PTA Visit #: 2/5      Time In: 10:58 am  Time Out: 12:00 pm  Total Billable Time:  60 minutes     SUBJECTIVE      Pt reports: continues to have "catching" in right hip that is very painful  She was compliant with home exercise program.  Response to previous treatment: sore  Functional change: None     Pain: 0/10 up to 9/10 at times  Location: right groin       Prior Level of Function: Patient was enjoying long-term and fell going down stairs  Current Level of Function: Patient wearing hip brace and using bilateral crutches    Precautions : Standard        OBJECTIVE      Objective Measures updated at progress report unless specified.      Treatment      Francis received the treatments listed below:       therapeutic exercises to develop strength, endurance, and ROM for 35 minutes including:     Bike - 5 minutes on the Bike  SB - 4x20 seconds  Hamstring Stretch on Stairs - right - 4x20 seconds  Quad Stretch 4x20 seconds    Cybex Leg Press - Bilateral 6 plates 3 x 10  Leg Press Single (Right) - 3 plates 3 x 10  TKE 4 plates 3 x 10  Standing Marches with just right lower extremity at rail 2# 2 x 10 NOT PERFORMED   Heel Raises - 3 x 10 at stairs  Bridges - 3 x 10 NOT PERFORMED   SLR - 3# 2 x 10 NOT PERFORMED   SL Abduction 3x10 2# NOT PERFORMED   Prone extension 3 x 10 2# NOT PERFORMED   Forward step ups 3 x 10 6"  Lateral step downs 2 x 10 4"  Cybex Hamstring Curls 3 x 10 with 4 Plates " "  Cybex Knee Extension 3 x 10 with 3 Plates    Cybex abduction- 2 x 10 2 plate   Cybex hip extension 2 x 10 2 plate  Cybex hip flexion 2 x 10 2 plate    manual therapy techniques: Joint mobilizations, Manual traction, and Myofacial release were applied to the: hip for 15 minutes, including:   Hip PROM  Glute Stretch  piriformis stretch  HS Stretch  Graston Technique with GT 4   Kylie's stretch     neuromuscular re-education activities to improve: Balance, Coordination, Kinesthetic, and Proprioception for 0 minutes. The following activities were included:   LONG ARC QUAD 2# 3 x 10 eccentric focus   QUAD SET - 30x 2 second hold       Patient received Ultrasound x 10 Minutes to Right Hip at 1MHZ x 20% Duty Factor x .5 with/cm2      MHP x 0 minutes        therapeutic activities to improve functional performance for 0 minutes, including:     gait training to improve functional mobility and safety for 0  minutes, including:           Patient Education and Home Exercises      Home Exercises Provided and Patient Education Provided      Education provided:   - AVS     Written Home Exercises Provided: Patient instructed to cont prior HEP. Exercises were reviewed and Francis was able to demonstrate them prior to the end of the session.  Francis demonstrated good  understanding of the education provided. See EMR under Patient Instructions for exercises provided during therapy sessions     ASSESSMENT      Patient continues to have "catch" in Right Hip. She describes this as a sharp pain near Glete Med/Min that occurs when she moves from sitting to standing. It catches when she nears full extension. Once it releases she is able to stand fully upright and walk, but the transition is very painful. Therapist had patient perform exercises as listed above. Manual Therapy performed for stretching of the Right Hip, Glute, and Piriformis. Ended session with Ultrasound to the lateral hip around Glute Med/Min and Greater Trochanter. She " still has a pocket of dense edema in this area. Ultrasound was performed at 20% Duty factor and .5 with/cm2 to address the pain and inflammation without increasing the swelling. Patient reported she did feel better after the treatment today. Will continue with the ultrasound if she feels like this is beneficial to her.            PMH from evaluation:   Francis is a 71 y.o. female referred to outpatient Physical Therapy with a medical diagnosis of Trochanteric bursitis of right hip. Patient presents with right hip pain due to right endoscopic gluteus medius repair 9/9/2022. Patient wearing hip brace to limit abduction/adduction. No active hip abduction for 8 weeks and no passive hip adduction. Patient is 50% Wb'ing using bilateral crutches for 4-6 weeks. Patient's strength and RANGE OF MOTION is limited. Patient expressed good understanding of precautions.        Francis Is progressing towards her goals.   Pt prognosis is Good.      Pt will continue to benefit from skilled outpatient physical therapy to address the deficits listed in the problem list box on initial evaluation, provide pt/family education and to maximize pt's level of independence in the home and community environment.      Pt's spiritual, cultural and educational needs considered and pt agreeable to plan of care and goals.     Anticipated barriers to physical therapy: None     Goals:  Short Term Goals: 4 weeks   1. Independent with Home Exercise Program : Met   2. Increase Right Hip Range of Motion to Within Functional Limits : Met  3. Increase Right Hip Strength to grossly 4/5 : Met  4. Patient will ambulate 500 feet with bilateral crutches with complaints of pain no grater than 2-3/10 : Not met currently due to recent flare up     Long Term Goals: 10 weeks   1. Patient will increase Right Hip Strength to grossly 4+/5 :Ongoing  2. Patient will ambulate 1000+ feet with no complaints of pain or weakness in Right Hip : Ongoing     PLAN      Updated  Certification Period: 11/15/2022 to 12/14/2022  Recommended Treatment Plan: 2 times per week for 4 weeks: Electrical Stimulation Pre-Mod, Gait Training, Manual Therapy, Moist Heat/ Ice, Neuromuscular Re-ed, Patient Education, Therapeutic Activities, Therapeutic Exercise, and Ultrasound     MAYO BERG, PT, DPT    11/17/2022

## 2022-11-22 ENCOUNTER — CLINICAL SUPPORT (OUTPATIENT)
Dept: REHABILITATION | Facility: HOSPITAL | Age: 72
End: 2022-11-22
Payer: MEDICARE

## 2022-11-22 DIAGNOSIS — M70.61 TROCHANTERIC BURSITIS OF RIGHT HIP: Primary | ICD-10-CM

## 2022-11-22 PROCEDURE — 97035 APP MDLTY 1+ULTRASOUND EA 15: CPT

## 2022-11-22 PROCEDURE — 97110 THERAPEUTIC EXERCISES: CPT | Mod: CQ

## 2022-11-22 PROCEDURE — 97140 MANUAL THERAPY 1/> REGIONS: CPT | Mod: CQ

## 2022-11-22 NOTE — PROGRESS NOTES
"[facility]   Physical Therapy Treatment Note      Name: Francis Parr  Clinic Number: [id number]     Visit Date: 9/20/2022  Therapy Diagnosis: Right gluteus medius repair  Physician: Elton Kent MD     Physician Orders: PT Eval and Treat right hip  Medical Diagnosis from Referral:  Right gluteus medius repair  Evaluation Date: 9/12/2022  Updated Plan of Care Due : 12/14/2022  Authorization Period Expiration: 9/8/2023  Plan of Care Expiration: 11/10/2022     Visit # / Visits authorized: 18 / 20  PTA Visit #: 1/5      Time In: 10:46 am  Time Out: 11:03 am  Time In/Out (ULTRASOUND):  Total Billable Time:  15 minutes     SUBJECTIVE      Pt reports: continues to have "catching" in right hip that is very painful but improving. Worst after sitting/laying for long time and standing up afterwards. She reports the ultrasound helped last time and she wants to try it again today.  She was compliant with home exercise program.  Response to previous treatment: sore  Functional change: None     Pain: 0/10 up to 9/10 at times  Location: right groin       Prior Level of Function: Patient was enjoying skilled nursing and fell going down stairs  Current Level of Function: Patient wearing hip brace and using bilateral crutches    Precautions : Standard        OBJECTIVE      Objective Measures updated at progress report unless specified.      Treatment      Francis received the treatments listed below:  Whitney De Paz, PT, DPT    Patient received Ultrasound x 15 Minutes to Right Hip at 1MHZ x 20% Duty Factor x .5 with/cm2      MHP x 0 minutes               Patient Education and Home Exercises      Home Exercises Provided and Patient Education Provided      Education provided:   - AVS     Written Home Exercises Provided: Patient instructed to cont prior HEP. Exercises were reviewed and Francis was able to demonstrate them prior to the end of the session.  Francis demonstrated good  understanding of the " "education provided. See EMR under Patient Instructions for exercises provided during therapy sessions     ASSESSMENT      Patient continues to have "catching" in right hip that is very painful but improving. Worst after sitting/laying for long time and standing up afterwards. She reports the ultrasound helped last time and she wants to try it again today. Patient was treated by HARRIS Castro for her regular treatment today. Whitney De Paz, PT, DPT performed the Ultrasound treatment today.  Ultrasound was performed x 15 Minutes to Right Hip at 1MHZ x 20% Duty Factor x .5 with/cm2. Time was increased due the large area of the hip that was treated. Will continue with the current Plan of Care.                PMH from evaluation:   Lynda is a 71 y.o. female referred to outpatient Physical Therapy with a medical diagnosis of Trochanteric bursitis of right hip. Patient presents with right hip pain due to right endoscopic gluteus medius repair 9/9/2022. Patient wearing hip brace to limit abduction/adduction. No active hip abduction for 8 weeks and no passive hip adduction. Patient is 50% Wb'ing using bilateral crutches for 4-6 weeks. Patient's strength and RANGE OF MOTION is limited. Patient expressed good understanding of precautions.        Lynda Is progressing towards her goals.   Pt prognosis is Good.      Pt will continue to benefit from skilled outpatient physical therapy to address the deficits listed in the problem list box on initial evaluation, provide pt/family education and to maximize pt's level of independence in the home and community environment.      Pt's spiritual, cultural and educational needs considered and pt agreeable to plan of care and goals.     Anticipated barriers to physical therapy: None     Goals:  Short Term Goals: 4 weeks   1. Independent with Home Exercise Program : Met   2. Increase Right Hip Range of Motion to Within Functional Limits : Met  3. Increase Right Hip Strength to " grossly 4/5 : Met  4. Patient will ambulate 500 feet with bilateral crutches with complaints of pain no grater than 2-3/10 : Not met currently due to recent flare up     Long Term Goals: 10 weeks   1. Patient will increase Right Hip Strength to grossly 4+/5 :Ongoing  2. Patient will ambulate 1000+ feet with no complaints of pain or weakness in Right Hip : Ongoing     PLAN      Updated Certification Period: 11/15/2022 to 12/14/2022  Recommended Treatment Plan: 2 times per week for 4 weeks: Electrical Stimulation Pre-Mod, Gait Training, Manual Therapy, Moist Heat/ Ice, Neuromuscular Re-ed, Patient Education, Therapeutic Activities, Therapeutic Exercise, and Ultrasound     Whitney De Paz, PT, DPT     11/22/2022

## 2022-11-22 NOTE — PROGRESS NOTES
"[facility]   Physical Therapy Treatment Note      Name: Francis Parr  Clinic Number: [id number]     Visit Date: 9/20/2022  Therapy Diagnosis: Right gluteus medius repair  Physician: Elton Kent MD     Physician Orders: PT Eval and Treat right hip  Medical Diagnosis from Referral:  Right gluteus medius repair  Evaluation Date: 9/12/2022  Updated Plan of Care Due : 12/14/2022  Authorization Period Expiration: 9/8/2023  Plan of Care Expiration: 11/10/2022     Visit # / Visits authorized: 18 / 20  PTA Visit #: 1/5      Time In: 10:04 am  Time Out: 10:46 pm  Time In/Out (ULTRASOUND):  Total Billable Time:  42 minutes     SUBJECTIVE      Pt reports: continues to have "catching" in right hip that is very painful but improving. Worst after sitting/laying for long time and standing up afterwards  She was compliant with home exercise program.  Response to previous treatment: sore  Functional change: None     Pain: 0/10 up to 9/10 at times  Location: right groin       Prior Level of Function: Patient was enjoying correction and fell going down stairs  Current Level of Function: Patient wearing hip brace and using bilateral crutches    Precautions : Standard        OBJECTIVE      Objective Measures updated at progress report unless specified.      Treatment      Francis received the treatments listed below:       therapeutic exercises to develop strength, endurance, and ROM for 30 minutes including:     Bike - 5 minutes on the Bike  SB - 4x20 seconds  Hamstring Stretch on Stairs - right - 4x20 seconds  Quad Stretch 4x20 seconds    Cybex Leg Press - Bilateral 7 plates 3 x 10  Leg Press Single (Right) - 3 plates 3 x 10  TKE 4 plates 3 x 10  Heel Raises - 3 x 10 at stairs  Bridges - 3 x 10 green band  SLR - 3# 2 x 10 NOT PERFORMED   SL Abduction 3x10 2# NOT PERFORMED   Prone extension 3 x 10 2# NOT PERFORMED   Forward step ups 3 x 10 6"  Lateral step downs 2 x 10 4"  Cybex Hamstring Curls 3 x " 10 with 4 Plates   Cybex Knee Extension 3 x 10 with 3 Plates    Cybex abduction- 2 x 10 2 plate   Cybex hip extension 2 x 10 2 plate  Cybex hip flexion 2 x 10 2 plate    manual therapy techniques: Joint mobilizations, Manual traction, and Myofacial release were applied to the: hip for 10 minutes, including:   Hip PROM  Glute Stretch  piriformis stretch  HS Stretch  IR/ER multiplanar   Graston Technique with GT 4   Kylie's stretch     neuromuscular re-education activities to improve: Balance, Coordination, Kinesthetic, and Proprioception for 0 minutes. The following activities were included:   LONG ARC QUAD 2# 3 x 10 eccentric focus   QUAD SET - 30x 2 second hold       Patient received Ultrasound x 10 Minutes to Right Hip at 1MHZ x 20% Duty Factor x .5 with/cm2      MHP x 0 minutes        therapeutic activities to improve functional performance for 0 minutes, including:     gait training to improve functional mobility and safety for 0  minutes, including:           Patient Education and Home Exercises      Home Exercises Provided and Patient Education Provided      Education provided:   - AVS     Written Home Exercises Provided: Patient instructed to cont prior HEP. Exercises were reviewed and Francis was able to demonstrate them prior to the end of the session.  Francis demonstrated good  understanding of the education provided. See EMR under Patient Instructions for exercises provided during therapy sessions     ASSESSMENT      Pt tolerated all therapeutic exercises and manual ROM today with minimal complaints of pain. After bike, pt had some tightness in hip that was relieved with hip joint mobility exercises. Progressed LE strengthening exercises with some fatigue noted. Pt has minimal trendelenburg sign with single leg exercises.     Ultrasound at end of session performed by Whitney De Paz DPT.           PMH from evaluation:   Francis is a 71 y.o. female referred to outpatient Physical Therapy with a medical  diagnosis of Trochanteric bursitis of right hip. Patient presents with right hip pain due to right endoscopic gluteus medius repair 9/9/2022. Patient wearing hip brace to limit abduction/adduction. No active hip abduction for 8 weeks and no passive hip adduction. Patient is 50% Wb'ing using bilateral crutches for 4-6 weeks. Patient's strength and RANGE OF MOTION is limited. Patient expressed good understanding of precautions.        Francis Is progressing towards her goals.   Pt prognosis is Good.      Pt will continue to benefit from skilled outpatient physical therapy to address the deficits listed in the problem list box on initial evaluation, provide pt/family education and to maximize pt's level of independence in the home and community environment.      Pt's spiritual, cultural and educational needs considered and pt agreeable to plan of care and goals.     Anticipated barriers to physical therapy: None     Goals:  Short Term Goals: 4 weeks   1. Independent with Home Exercise Program : Met   2. Increase Right Hip Range of Motion to Within Functional Limits : Met  3. Increase Right Hip Strength to grossly 4/5 : Met  4. Patient will ambulate 500 feet with bilateral crutches with complaints of pain no grater than 2-3/10 : Not met currently due to recent flare up     Long Term Goals: 10 weeks   1. Patient will increase Right Hip Strength to grossly 4+/5 :Ongoing  2. Patient will ambulate 1000+ feet with no complaints of pain or weakness in Right Hip : Ongoing     PLAN      Updated Certification Period: 11/15/2022 to 12/14/2022  Recommended Treatment Plan: 2 times per week for 4 weeks: Electrical Stimulation Pre-Mod, Gait Training, Manual Therapy, Moist Heat/ Ice, Neuromuscular Re-ed, Patient Education, Therapeutic Activities, Therapeutic Exercise, and Ultrasound     Froilan Whitley, PTA,     11/22/2022

## 2023-01-26 NOTE — PROGRESS NOTES
[facility]   Physical Therapy Treatment Note      Name: Francis Parr  Clinic Number: [id number]     Visit Date: 9/20/2022  Therapy Diagnosis: Right gluteus medius repair  Physician: Elton Kent MD     Physician Orders: PT Eval and Treat right hip  Medical Diagnosis from Referral:  Right gluteus medius repair  Evaluation Date: 9/12/2022  Updated Plan of Care Due : 10/11/2022  Authorization Period Expiration: 9/8/2023  Plan of Care Expiration: 11/7/2022     Visit # / Visits authorized: 4/ 16  PTA Visit #: 1/5      Time In: 11:28 am  Time Out: 12:10 pm  Total Billable Time: 42 minutes     SUBJECTIVE      Pt reports: she is doing good with minimal pain noted, just soreness. She saw MD 9/22 and he ordered her to continue PWB for 2 weeks and then wean off crutches. Pressure in the groin area, no pain like last time  She was compliant with home exercise program.  Response to previous treatment: sore  Functional change: None     Pain: 0/10  Location: right groin       Prior Level of Function: Patient was enjoying MCFP and fell going down stairs  Current Level of Function: Patient wearing hip brace and using bilateral crutches        OBJECTIVE      Objective Measures updated at progress report unless specified.      Treatment      Francis received the treatments listed below:       therapeutic exercises to develop strength, endurance, and ROM for 25 minutes including:     NuStep - 5 minutes  SB  Hamstring Stretch on Stairs - right - 4x20 seconds     Supine:  STRAIGHT LEG RAISE 20x LLE  Heel slides supine - 30x red band  Knee flexion to 90 degrees     Seated:  Hip Add with Ball - 30x3 seconds  Hip flexion    Cybex Hip - right     Flexion   Extension      Cybex Leg Press - Bilateral 4 plates 30x  Leg Press Single - 2 plates 30x  TKE 4 plates 30x  Sit to Stand from Chair - 2x10  Standing Marches with just right lower extremity at rail 2# 20x   Heel Raises - 3x10 at rail     Cybex  Hamstring Curls   Cybex Knee Extension      manual therapy techniques: Joint mobilizations, Manual traction, and Myofacial release were applied to the: hip for 0 minutes, including:        neuromuscular re-education activities to improve: Balance, Coordination, Kinesthetic, and Proprioception for 10 minutes. The following activities were included:   LONG ARC QUAD 2# 30x eccentric focus   QUAD SET - 30x 2 second hold   Glute set - 20x (partial bridge)    therapeutic activities to improve functional performance for 0 minutes, including:     gait training to improve functional mobility and safety for 0  minutes, including:           Patient Education and Home Exercises      Home Exercises Provided and Patient Education Provided      Education provided:   - AVS     Written Home Exercises Provided: Patient instructed to cont prior HEP. Exercises were reviewed and Francis was able to demonstrate them prior to the end of the session.  Francis demonstrated good  understanding of the education provided. See EMR under Patient Instructions for exercises provided during therapy sessions     ASSESSMENT      Pt tolerated all therapeutic exercises today with min. C/o fatigue noted. Adhered to protocol. Pt fatigues quickly with hip/quad strengthening. Time billed reflects time spent one on one with pt due to overlap of patients.      PMH from evaluation:   Francis is a 71 y.o. female referred to outpatient Physical Therapy with a medical diagnosis of Trochanteric bursitis of right hip. Patient presents with right hip pain due to right endoscopic gluteus medius repair 9/9/2022. Patient wearing hip brace to limit abduction/adduction. No active hip abduction for 8 weeks and no passive hip adduction. Patient is 50% Wb'ing using bilateral crutches for 4-6 weeks. Patient's strength and RANGE OF MOTION is limited. Patient expressed good understanding of precautions.        Francis Is progressing towards her goals.   Pt prognosis is Good.       Pt will continue to benefit from skilled outpatient physical therapy to address the deficits listed in the problem list box on initial evaluation, provide pt/family education and to maximize pt's level of independence in the home and community environment.      Pt's spiritual, cultural and educational needs considered and pt agreeable to plan of care and goals.     Anticipated barriers to physical therapy: None     Goals:  Short Term Goals: 4 weeks   1. Independent with Home Exercise Program   2. Increase Right Hip Range of Motion to Within Functional Limits   3. Increase Right Hip Strength to grossly 4/5  4. Patient will ambulate 500 feet with bilateral crutches with complaints of pain no grater than 2-3/10     Long Term Goals: 8 weeks   1. Patient will increase Right Hip Strength to grossly 4+/5  2. Patient will ambulate 1000+ feet with no complaints of pain or weakness in Right Hip      PLAN      Plan of care Certification: 9/12/2022 to 11/7/2022.     Outpatient Physical Therapy 2 times weekly for 8 weeks to include the following interventions: Electrical Stimulation Pre-Mod, Gait Training, Manual Therapy, Moist Heat/ Ice, Neuromuscular Re-ed, Patient Education, Therapeutic Activities, Therapeutic Exercise, and Ultrasound.      Levon Whitley PTA  9/29/2022                 Patient expressed no known problems or needs

## 2023-03-01 ENCOUNTER — OFFICE VISIT (OUTPATIENT)
Dept: PAIN MEDICINE | Facility: CLINIC | Age: 73
End: 2023-03-01
Payer: MEDICARE

## 2023-03-01 VITALS
HEIGHT: 62 IN | WEIGHT: 153 LBS | HEART RATE: 77 BPM | BODY MASS INDEX: 28.16 KG/M2 | SYSTOLIC BLOOD PRESSURE: 152 MMHG | DIASTOLIC BLOOD PRESSURE: 68 MMHG

## 2023-03-01 DIAGNOSIS — Z98.1 HISTORY OF LUMBAR SPINAL FUSION: ICD-10-CM

## 2023-03-01 DIAGNOSIS — M54.16 LUMBAR RADICULOPATHY, CHRONIC: Primary | ICD-10-CM

## 2023-03-01 PROCEDURE — 99215 OFFICE O/P EST HI 40 MIN: CPT | Mod: PBBFAC | Performed by: PAIN MEDICINE

## 2023-03-01 PROCEDURE — 99213 PR OFFICE/OUTPT VISIT, EST, LEVL III, 20-29 MIN: ICD-10-PCS | Mod: S$PBB,,, | Performed by: PAIN MEDICINE

## 2023-03-01 PROCEDURE — 99213 OFFICE O/P EST LOW 20 MIN: CPT | Mod: S$PBB,,, | Performed by: PAIN MEDICINE

## 2023-03-01 NOTE — PROGRESS NOTES
She Disclaimer: This note has been generated using voice-recognition software. There may be typographical errors that have been missed during proof-reading        Patient ID: Lynda Parr is a 72 y.o. female.      Chief Complaint: Low-back Pain      72-year-old female returns for re-evaluation of recurrent lower back and bilateral lumbar radicular pain.  She received an L3-4 epidural steroid injection July 21, 2022  and experienced good relief until December 2022.  MRI revealed severe stenosis at L3-4, disc bulge L3-4 and L5-S1.  She is status post L4-5 TLIF by Dr. Clark in 2019 and a L3-4 fusion was discussed.  She complains of lower back pain with radicular symptoms to the lower extremities and feet.  She has both good days and bad days.  She notes  occasional paresthesia, weakness and aching of the lower extremities.  She returns today to discuss possible repeat injections for recurrent pain.             Pain Assessment  Pain Assessment: 0-10  Pain Score:   7  Pain Location: Other (Comment) (lower back)  Pain Orientation: Right, Left  Pain Radiating Towards: radiates down bilateral legs to feet  Pain Descriptors: Aching, Radiating  Pain Frequency: Intermittent  Pain Onset: Awakened from sleep  Clinical Progression: Gradually worsening  Aggravating Factors: Standing, Walking, Other (Comment) (sitting and lying)  Pain Intervention(s): Ambulation/increased activity      A's of Opioid Risk Assessment  Activity:Patient has difficulty performing ADL.   Analgesia: None   Adverse Effects: Patient denies constipation or sedation.  Aberrant Behavior:  reviewed with no aberrant drug seeking/taking behavior.      Patient denies any suicidal or homicidal ideations    Physical Therapy/Home Exercise: yes          Review of Systems   Constitutional: Negative.    HENT: Negative.     Eyes: Negative.    Respiratory: Negative.     Cardiovascular: Negative.    Gastrointestinal: Negative.    Endocrine: Negative.    Genitourinary:  Negative.    Musculoskeletal:  Positive for arthralgias (Right hip), back pain, gait problem and leg pain (BLE).   Integumentary:  Negative.   Neurological:  Positive for numbness (BLE).   Hematological: Negative.    Psychiatric/Behavioral: Negative.             Past Medical History:   Diagnosis Date    Benign essential HTN     Carpal tunnel syndrome, bilateral upper limbs     Chronic low back pain     Degeneration, intervertebral disc, lumbosacral     GERD (gastroesophageal reflux disease)     Hypercholesterolemia     Insomnia     Iron deficiency anemia     Localized swelling, mass and lump, right lower limb     Lower extremity edema     Lumbar spondylosis     Prediabetes     Restless leg     Slow transit constipation     Spinal stenosis, lumbar     Vitamin D deficiency      Past Surgical History:   Procedure Laterality Date    CATARACT EXTRACTION Left 09/10/2020    CATARACT EXTRACTION Right 10/01/2020    Dr. Stephen Fox    CHOLECYSTECTOMY  1990    COSMETIC SURGERY      eyelid    EPIDURAL STEROID INJECTION INTO LUMBAR SPINE N/A 7/21/2022    Procedure: Injection-steroid-epidural-lumbar  L3-L4  CHRIS  NO SEDATION;  Surgeon: Esther Delgado MD;  Location: [facility];  Service: Pain Management;  Laterality: N/A;  STATES HAD VAC  WILL BRING CARD    HIP ARTHROPLASTY Left 10/2015    [facility] med[facility]    SHOULDER ARTHROSCOPY W/ ROTATOR CUFF REPAIR  2005    SINUS SURGERY  02/2011    TUBAL LIGATION      VASCULAR SURGERY  12/15/2017    R [facility] Laser Ablation per Dr. Herman Bullard     Social History     Socioeconomic History    Marital status:     Number of children: 1   Tobacco Use    Smoking status: Never    Smokeless tobacco: Never   Substance and Sexual Activity    Alcohol use: Yes    Drug use: Never    Sexual activity: Yes     Family History   Problem Relation Age of Onset    Hypertension Mother     Coronary artery disease Father      Pulmonary embolism Brother     No Known Problems Son      Review of patient's allergies indicates:   Allergen Reactions    Ace inhibitors Other (See Comments)     has a current medication list which includes the following prescription(s): aspirin, ezetimibe, ferrous sulfate, losartan, metformin, multivitamin, omeprazole, pyridoxine (vitamin b6), vitamin d, and zinc gluconate.      Objective:  Vitals:    03/01/23 1051   BP: (!) 152/68   Pulse: 77        Physical Exam  Nursing note reviewed.   Constitutional:       General: She is not in acute distress.     Appearance: Normal appearance. She is not ill-appearing, toxic-appearing or diaphoretic.   HENT:      Head: Normocephalic and atraumatic.      Nose: Nose normal.      Mouth/Throat:      Mouth: Mucous membranes are moist.   Eyes:      Extraocular Movements: Extraocular movements intact.      Pupils: Pupils are equal, round, and reactive to light.   Cardiovascular:      Rate and Rhythm: Normal rate and regular rhythm.      Heart sounds: Normal heart sounds.   Pulmonary:      Effort: Pulmonary effort is normal. No respiratory distress.      Breath sounds: Normal breath sounds. No stridor. No wheezing or rhonchi.   Abdominal:      General: Bowel sounds are normal.      Palpations: Abdomen is soft.   Musculoskeletal:         General: No swelling or deformity.      Cervical back: Normal and normal range of motion. No spasms or tenderness. No pain with movement. Normal range of motion.      Thoracic back: Normal.      Lumbar back: Tenderness and bony tenderness present. No spasms. Decreased range of motion. Negative right straight leg raise test and negative left straight leg raise test. No scoliosis.      Right hip: Tenderness and bony tenderness present. Decreased range of motion. Decreased strength.      Right lower leg: No edema.      Left lower leg: No edema.      Comments: Lumbar pain with flexion, extension and  lateral rotation.   Skin:     General: Skin is warm.    Neurological:      General: No focal deficit present.      Mental Status: She is alert and oriented to person, place, and time. Mental status is at baseline.      Cranial Nerves: No cranial nerve deficit.      Sensory: Sensation is intact. No sensory deficit.      Motor: Weakness (BLE) present.      Coordination: Coordination normal.      Gait: Gait normal.      Deep Tendon Reflexes: Reflexes are normal and symmetric.   Psychiatric:         Mood and Affect: Mood normal.         Behavior: Behavior normal.         Assessment:      1. Lumbar radiculopathy, chronic    2. History of lumbar spinal fusion          Plan:  Schedule L3-4 epidural steroid injection for lumbar radiculopathy.  Patient does not desire anesthesia sedation   2.Indications for this procedure for this specific patient include the following     - Pt has been in pain for at least 6 weeks and has failed conservative care (e.g. Exercise, physical methods, NSAID/ and or muscle relaxants)  - Pt has no major risk factors for spinal cancer or contraindicated condition   - Radicular pain is interfering with functional activity  - Pain is associated with symptoms of nerve root irritation   - Any numbness documented is accompanied with paresthesia   - No evidence of systemic or local infection, bleeding tendency or unstable medical condition   - Epidural provided as part of a comprehensive pain management program  - All repeat injections have at least 80% pain relief and increase functional gain and physical activity, and reduction in reliance on the use of medication and or physical therapy  - Pt has significant functional limitation resulting in diminished quality of life and impaired age appropriate ADL's.   - Diagnostic evaluation has ruled out other causes of pain  - Pt participating in an active rehabilitation program or home exercise program which has been discussed with the patient including heat ice and rest  - No more than 3 epidurals will be done  in a 6 month period at the same level with at least 7 days between injections  - MAC is only offered in cases of needle phobia   - Injection done at L3-4 level which is consistent with patient's dermatomal pain complaint    3.Monitored Anesthesia Care medical necessity authorization request:    Monitor anesthesia request is medically indicated for the scheduled nerve block procedure due to:  - needle phobia and anxiety, placing  the patient at risk during the provided service.  -patient has severe problems with muscles and muscle spasticity that makes it hard to lie still  -patient suffers from chronic pain and is unable to function due to  diminished ADLs    4.The planned medically necessary  surgical procedure is performed in a hospital outpatient department and not in an ambulatory surgical center due to:     -there is no geographically assessable ambulatory surgery center that has the  necessary equipment and fluoroscopy needed for the procedure     -there is no geographically assessable ambulatory surgical center available at which the physician has privileges     -an ASC's  specific  guideline regarding the individuals weight or health conditions that prevent the use of an ASC       report:  Reviewed and consistent with medication use as prescribed.      The total time spent for evaluation and management on 03/01/2023 including reviewing separately obtained history, performing a medically appropriate exam and evaluation, documenting clinical information in the health record, independently interpreting results and communicating them to the patient/family/caregiver, and ordering medications/tests/procedures was between 15-29 minutes.    The above plan and management options were discussed at length with patient. Patient is in agreement with the above and verbalized understanding. It will be communicated with the referring physician via electronic record, fax, or mail.

## 2023-03-01 NOTE — H&P (VIEW-ONLY)
She Disclaimer: This note has been generated using voice-recognition software. There may be typographical errors that have been missed during proof-reading        Patient ID: Lynda Parr is a 72 y.o. female.      Chief Complaint: Low-back Pain      72-year-old female returns for re-evaluation of recurrent lower back and bilateral lumbar radicular pain.  She received an L3-4 epidural steroid injection July 21, 2022  and experienced good relief until December 2022.  MRI revealed severe stenosis at L3-4, disc bulge L3-4 and L5-S1.  She is status post L4-5 TLIF by Dr. Clark in 2019 and a L3-4 fusion was discussed.  She complains of lower back pain with radicular symptoms to the lower extremities and feet.  She has both good days and bad days.  She notes  occasional paresthesia, weakness and aching of the lower extremities.  She returns today to discuss possible repeat injections for recurrent pain.             Pain Assessment  Pain Assessment: 0-10  Pain Score:   7  Pain Location: Other (Comment) (lower back)  Pain Orientation: Right, Left  Pain Radiating Towards: radiates down bilateral legs to feet  Pain Descriptors: Aching, Radiating  Pain Frequency: Intermittent  Pain Onset: Awakened from sleep  Clinical Progression: Gradually worsening  Aggravating Factors: Standing, Walking, Other (Comment) (sitting and lying)  Pain Intervention(s): Ambulation/increased activity      A's of Opioid Risk Assessment  Activity:Patient has difficulty performing ADL.   Analgesia: None   Adverse Effects: Patient denies constipation or sedation.  Aberrant Behavior:  reviewed with no aberrant drug seeking/taking behavior.      Patient denies any suicidal or homicidal ideations    Physical Therapy/Home Exercise: yes          Review of Systems   Constitutional: Negative.    HENT: Negative.     Eyes: Negative.    Respiratory: Negative.     Cardiovascular: Negative.    Gastrointestinal: Negative.    Endocrine: Negative.    Genitourinary:  Negative.    Musculoskeletal:  Positive for arthralgias (Right hip), back pain, gait problem and leg pain (BLE).   Integumentary:  Negative.   Neurological:  Positive for numbness (BLE).   Hematological: Negative.    Psychiatric/Behavioral: Negative.             Past Medical History:   Diagnosis Date    Benign essential HTN     Carpal tunnel syndrome, bilateral upper limbs     Chronic low back pain     Degeneration, intervertebral disc, lumbosacral     GERD (gastroesophageal reflux disease)     Hypercholesterolemia     Insomnia     Iron deficiency anemia     Localized swelling, mass and lump, right lower limb     Lower extremity edema     Lumbar spondylosis     Prediabetes     Restless leg     Slow transit constipation     Spinal stenosis, lumbar     Vitamin D deficiency      Past Surgical History:   Procedure Laterality Date    CATARACT EXTRACTION Left 09/10/2020    CATARACT EXTRACTION Right 10/01/2020    Dr. Stephen Fox    CHOLECYSTECTOMY  1990    COSMETIC SURGERY      eyelid    EPIDURAL STEROID INJECTION INTO LUMBAR SPINE N/A 7/21/2022    Procedure: Injection-steroid-epidural-lumbar  L3-L4  CHRIS  NO SEDATION;  Surgeon: Esther Delgado MD;  Location: [facility];  Service: Pain Management;  Laterality: N/A;  STATES HAD VAC  WILL BRING CARD    HIP ARTHROPLASTY Left 10/2015    [facility] glute[facility] medius [facility]    SHOULDER ARTHROSCOPY W/ ROTATOR CUFF REPAIR  2005    SINUS SURGERY  02/2011    TUBAL LIGATION      VASCULAR SURGERY  12/15/2017    R [facility] Laser Ablation per Dr. Herman Bullard     Social History     Socioeconomic History    Marital status:     Number of children: 1   Tobacco Use    Smoking status: Never    Smokeless tobacco: Never   Substance and Sexual Activity    Alcohol use: Yes    Drug use: Never    Sexual activity: Yes     Family History   Problem Relation Age of Onset    Hypertension Mother     Coronary artery disease Father      Pulmonary embolism Brother     No Known Problems Son      Review of patient's allergies indicates:   Allergen Reactions    Ace inhibitors Other (See Comments)     has a current medication list which includes the following prescription(s): aspirin, ezetimibe, ferrous sulfate, losartan, metformin, multivitamin, omeprazole, pyridoxine (vitamin b6), vitamin d, and zinc gluconate.      Objective:  Vitals:    03/01/23 1051   BP: (!) 152/68   Pulse: 77        Physical Exam  Nursing note reviewed.   Constitutional:       General: She is not in acute distress.     Appearance: Normal appearance. She is not ill-appearing, toxic-appearing or diaphoretic.   HENT:      Head: Normocephalic and atraumatic.      Nose: Nose normal.      Mouth/Throat:      Mouth: Mucous membranes are moist.   Eyes:      Extraocular Movements: Extraocular movements intact.      Pupils: Pupils are equal, round, and reactive to light.   Cardiovascular:      Rate and Rhythm: Normal rate and regular rhythm.      Heart sounds: Normal heart sounds.   Pulmonary:      Effort: Pulmonary effort is normal. No respiratory distress.      Breath sounds: Normal breath sounds. No stridor. No wheezing or rhonchi.   Abdominal:      General: Bowel sounds are normal.      Palpations: Abdomen is soft.   Musculoskeletal:         General: No swelling or deformity.      Cervical back: Normal and normal range of motion. No spasms or tenderness. No pain with movement. Normal range of motion.      Thoracic back: Normal.      Lumbar back: Tenderness and bony tenderness present. No spasms. Decreased range of motion. Negative right straight leg raise test and negative left straight leg raise test. No scoliosis.      Right hip: Tenderness and bony tenderness present. Decreased range of motion. Decreased strength.      Right lower leg: No edema.      Left lower leg: No edema.      Comments: Lumbar pain with flexion, extension and  lateral rotation.   Skin:     General: Skin is warm.    Neurological:      General: No focal deficit present.      Mental Status: She is alert and oriented to person, place, and time. Mental status is at baseline.      Cranial Nerves: No cranial nerve deficit.      Sensory: Sensation is intact. No sensory deficit.      Motor: Weakness (BLE) present.      Coordination: Coordination normal.      Gait: Gait normal.      Deep Tendon Reflexes: Reflexes are normal and symmetric.   Psychiatric:         Mood and Affect: Mood normal.         Behavior: Behavior normal.         Assessment:      1. Lumbar radiculopathy, chronic    2. History of lumbar spinal fusion          Plan:  Schedule L3-4 epidural steroid injection for lumbar radiculopathy.  Patient does not desire anesthesia sedation   2.Indications for this procedure for this specific patient include the following     - Pt has been in pain for at least 6 weeks and has failed conservative care (e.g. Exercise, physical methods, NSAID/ and or muscle relaxants)  - Pt has no major risk factors for spinal cancer or contraindicated condition   - Radicular pain is interfering with functional activity  - Pain is associated with symptoms of nerve root irritation   - Any numbness documented is accompanied with paresthesia   - No evidence of systemic or local infection, bleeding tendency or unstable medical condition   - Epidural provided as part of a comprehensive pain management program  - All repeat injections have at least 80% pain relief and increase functional gain and physical activity, and reduction in reliance on the use of medication and or physical therapy  - Pt has significant functional limitation resulting in diminished quality of life and impaired age appropriate ADL's.   - Diagnostic evaluation has ruled out other causes of pain  - Pt participating in an active rehabilitation program or home exercise program which has been discussed with the patient including heat ice and rest  - No more than 3 epidurals will be done  in a 6 month period at the same level with at least 7 days between injections  - MAC is only offered in cases of needle phobia   - Injection done at L3-4 level which is consistent with patient's dermatomal pain complaint    3.Monitored Anesthesia Care medical necessity authorization request:    Monitor anesthesia request is medically indicated for the scheduled nerve block procedure due to:  - needle phobia and anxiety, placing  the patient at risk during the provided service.  -patient has severe problems with muscles and muscle spasticity that makes it hard to lie still  -patient suffers from chronic pain and is unable to function due to  diminished ADLs    4.The planned medically necessary  surgical procedure is performed in a hospital outpatient department and not in an ambulatory surgical center due to:     -there is no geographically assessable ambulatory surgery center that has the  necessary equipment and fluoroscopy needed for the procedure     -there is no geographically assessable ambulatory surgical center available at which the physician has privileges     -an ASC's  specific  guideline regarding the individuals weight or health conditions that prevent the use of an ASC       report:  Reviewed and consistent with medication use as prescribed.      The total time spent for evaluation and management on 03/01/2023 including reviewing separately obtained history, performing a medically appropriate exam and evaluation, documenting clinical information in the health record, independently interpreting results and communicating them to the patient/family/caregiver, and ordering medications/tests/procedures was between 15-29 minutes.    The above plan and management options were discussed at length with patient. Patient is in agreement with the above and verbalized understanding. It will be communicated with the referring physician via electronic record, fax, or mail.

## 2023-03-01 NOTE — PATIENT INSTRUCTIONS
L3-4 CHRIS  3/16/23 AT 9:00 AM        Procedure Instructions:    Nothing to eat or drink for 8 hours or after midnight including gum, candy, mints, or tobacco products.  If you are scheduled for 1:30 or later nothing to eat or drink after 5 a.m. the morning of the procedure, including gum, candy, mints, or tobacco products.  Must have a  at least 18 yrs of age to stay present at all times  No Diabetic medications the morning of procedure, check blood sugar the morning of procedure, if it is greater than 200 call the office at [phone]  If you are started on antibiotics or have been prescribed antibiotics, have a fever, or have any other type of infection call to reschedule procedure.  If you take blood pressure medications you can take it at your regular scheduled time with a small sip of WATER!    HOLD ASPIRIN AND ASPIRIN PRODUCTS  (ASPIRIN, BC POWDER ETC. ) FOR 7 DAYS  PRIOR TO PROCEDURE  HOLD NSAIDS( ibuprofen, mobic, meloxicam, advil, diclofenac, naproxen, relafen, celebrex,  methotrexate, aleve etc....)  FOR 3 DAYS   PRIOR TO PROCEDURE

## 2023-03-16 ENCOUNTER — HOSPITAL ENCOUNTER (OUTPATIENT)
Facility: HOSPITAL | Age: 73
Discharge: HOME | End: 2023-03-16
Attending: PAIN MEDICINE | Admitting: PAIN MEDICINE
Payer: MEDICARE

## 2023-03-16 VITALS
SYSTOLIC BLOOD PRESSURE: 137 MMHG | OXYGEN SATURATION: 99 % | HEART RATE: 70 BPM | DIASTOLIC BLOOD PRESSURE: 62 MMHG | RESPIRATION RATE: 13 BRPM

## 2023-03-16 DIAGNOSIS — M54.16 LUMBAR RADICULOPATHY, CHRONIC: ICD-10-CM

## 2023-03-16 PROCEDURE — 62323 NJX INTERLAMINAR LMBR/SAC: CPT | Performed by: PAIN MEDICINE

## 2023-03-16 PROCEDURE — 62323 NJX INTERLAMINAR LMBR/SAC: CPT | Mod: ,,, | Performed by: PAIN MEDICINE

## 2023-03-16 PROCEDURE — 63600175 PHARM REV CODE 636 W HCPCS: Performed by: PAIN MEDICINE

## 2023-03-16 PROCEDURE — 25500020 PHARM REV CODE 255: Performed by: PAIN MEDICINE

## 2023-03-16 PROCEDURE — 62323 PR INJ LUMBAR/SACRAL, W/IMAGING GUIDANCE: ICD-10-PCS | Mod: ,,, | Performed by: PAIN MEDICINE

## 2023-03-16 RX ORDER — IOPAMIDOL 612 MG/ML
INJECTION, SOLUTION INTRATHECAL CODE/TRAUMA/SEDATION MEDICATION
Status: DISCONTINUED | OUTPATIENT
Start: 2023-03-16 | End: 2023-03-16 | Stop reason: HOSPADM

## 2023-03-16 RX ORDER — TRIAMCINOLONE ACETONIDE 40 MG/ML
INJECTION, SUSPENSION INTRA-ARTICULAR; INTRAMUSCULAR CODE/TRAUMA/SEDATION MEDICATION
Status: DISCONTINUED | OUTPATIENT
Start: 2023-03-16 | End: 2023-03-16 | Stop reason: HOSPADM

## 2023-03-16 RX ORDER — SODIUM CHLORIDE 9 MG/ML
INJECTION, SOLUTION INTRAVENOUS CONTINUOUS
Status: DISCONTINUED | OUTPATIENT
Start: 2023-03-16 | End: 2023-03-16 | Stop reason: HOSPADM

## 2023-03-16 NOTE — BRIEF OP NOTE
Discharge Note  Short Stay    Admit Date: 3/16/2023    Discharge Date: 3/16/2023    Attending Physician: Esther Delgado     Discharge Provider: Esther Delgado    Diagnosis: Lumbar radiculopathy    Discharged Condition: Good    Final Diagnoses: Lumbar radiculopathy, chronic [M54.16]    Disposition: Home or Self Care    Hospital Course: No complications, uneventful    Outcome of Hospitalization, Treatment, Procedure, or Surgery:  Patient was admitted for outpatient interventional pain management procedure. The patient tolerated the procedure well with no complications.    Follow up/Patient Instructions:  Follow up as scheduled in Pain Management office in 3-4 weeks.  Patient has received instructions and follow up date and time.    Medications:  Continue previous medications

## 2023-03-16 NOTE — DISCHARGE SUMMARY
[facility] - Pain Management  Discharge Note  Short Stay    Procedure(s) (LRB):  L3-4 CHRIS (Bilateral)      OUTCOME: Patient tolerated treatment/procedure well without complication and is now ready for discharge.    DISPOSITION: Home or Self Care    FINAL DIAGNOSIS:  Lumbar radiculopathy    FOLLOWUP: In clinic    DISCHARGE INSTRUCTIONS:  See nurse's notes     TIME SPENT ON DISCHARGE: 5 minutes

## 2023-03-16 NOTE — DISCHARGE INSTRUCTIONS
Continue diet as tolerated. Drink plenty of fluids and rest.   Notify MD of redness or drainage from incision site as well as any fever over the next 3-4 days.  No lifting over 5 lbs for the next 24 hrs.   Continue medications as prescribed. May take pain medication as prescribed.   May shower tomorrow. No tub baths for 48 hours following procedure.   May remove bandaids tomorrow, if they fall off before tomorrow they do not have to be replaced.

## 2023-03-16 NOTE — PLAN OF CARE
Plan:  D/c pt at 1045    Notify if redness, drainage, from injection site or fever over next 3-4 days. Rest and drink plenty of fluids for the remainder of the day. No lifting over 5 lbs. For the remainder of the day. Continue regular medications as prescribed. May take pain medications as prescribed.     Pain improved 100%

## 2023-03-16 NOTE — OP NOTE
"Procedure Note    Procedure Date: 3/16/2023    Procedure Performed:  Lumbar interlaminar epidural steroid injection under fluoroscopy at L3-4    Indications: Patient failed conservative therapy.      Pre-op diagnosis: Lumbar Radiculopathy    Post-op diagnosis: same    Physician: Esther Delgado MD    Anesthesia: Local    Medications injected: Kenalog 40mg,  2 mL sterile preservative-free normal saline.    Local anesthetic used: 1% Lidocaine, 3 ml    Estimated Blood Loss: None    Complications:  None    Technique:  The patient was interviewed in the holding area and Risks/Benefits were discussed, including, but not limited to, the possibility of new or different pain, bleeding or infection.   All questions were answered.  The patient understood and accepted risks.  Consent was verified and signed.   A time-out was taken to identify patient and procedure prior to starting the procedure. The patient was placed in the prone position on the fluoroscopy table. The area of the lumbar spine was prepped with Chloraprep and draped in a sterile manner. The L3-4  interspace was identified and marked under AP fluoroscopy. The skin and subcutaneous tissues overlying the targeted interspace were anesthetized with 3-5 mL of 1% lidocaine using a 25G 1.5" needle.  A 20G  3.5" Tuohy epidural needle was directed toward the interspace under fluoroscopic guidance until the ligamentum flavum was engaged. From this point, a loss of resistance technique with a pulsator syringe a was used to identify entrance of the needle into the epidural space. Once loss of resistance was observed 3mL of Isovue contrast solution was injected. An appropriate epidurogram was noted.  A 3mL mixture consisting of saline and 40 mg of kenalog was injected slowly and without resistance.  The needle was  removed and a sterile Band-Aid dressing was applied to the puncture site.  The patient tolerated the procedure well and was transferred to the .AC. in stable " condition.  The patient was monitored after the procedure and was given post-procedure and discharge instructions to follow at home. The patient was discharged in a stable condition and accompanied by an adult .    Epidurogram:5 mL allotment of Isovue M 300 contrast revealed excellent delineation from L2-4. There were no filling defects or obstruction to dye flow noted.  There was no  intravascular or intrathecal spread noted with dye flow.

## 2023-03-29 ENCOUNTER — OFFICE VISIT (OUTPATIENT)
Dept: PAIN MEDICINE | Facility: CLINIC | Age: 73
End: 2023-03-29
Payer: MEDICARE

## 2023-03-29 VITALS
DIASTOLIC BLOOD PRESSURE: 57 MMHG | HEART RATE: 70 BPM | BODY MASS INDEX: 28.04 KG/M2 | HEIGHT: 62 IN | WEIGHT: 152.38 LBS | SYSTOLIC BLOOD PRESSURE: 147 MMHG

## 2023-03-29 DIAGNOSIS — M54.16 LUMBAR RADICULOPATHY: Chronic | ICD-10-CM

## 2023-03-29 DIAGNOSIS — M96.1 POSTLAMINECTOMY SYNDROME OF LUMBAR REGION: Chronic | ICD-10-CM

## 2023-03-29 DIAGNOSIS — M25.559 PAIN IN UNSPECIFIED HIP: Primary | Chronic | ICD-10-CM

## 2023-03-29 DIAGNOSIS — M54.50 LOW BACK PAIN, UNSPECIFIED BACK PAIN LATERALITY, UNSPECIFIED CHRONICITY, UNSPECIFIED WHETHER SCIATICA PRESENT: Chronic | ICD-10-CM

## 2023-03-29 PROCEDURE — 99212 PR OFFICE/OUTPT VISIT, EST, LEVL II, 10-19 MIN: ICD-10-PCS | Mod: S$PBB,,, | Performed by: PAIN MEDICINE

## 2023-03-29 PROCEDURE — 99214 OFFICE O/P EST MOD 30 MIN: CPT | Mod: PBBFAC | Performed by: PAIN MEDICINE

## 2023-03-29 PROCEDURE — 99212 OFFICE O/P EST SF 10 MIN: CPT | Mod: S$PBB,,, | Performed by: PAIN MEDICINE

## 2023-03-29 NOTE — PROGRESS NOTES
She Disclaimer: This note has been generated using voice-recognition software. There may be typographical errors that have been missed during proof-reading        Patient ID: Lynda Parr is a 72 y.o. female.      Chief Complaint: Low-back Pain and Hip Pain (Right hip pain)      72-year-old female returns following L3-4 epidural steroid injection March 16, 2023.  She reports 95% pain relief and ongoing.  Her lower back pain is tolerable but she  still experiences right hip pain. She is awaiting a right hip injection in [place] April 12, 2023.  Overall she feels significant better and  does not desire additional treatment for the lower back pain at this time.                   Pain Assessment  Pain Assessment: 0-10  Pain Score: 0-No pain  Pain Location: Back (lower back)  Pain Descriptors: Aching, Sharp  Pain Frequency: Constant/continuous  Pain Onset: Awakened from sleep  Clinical Progression: Gradually improving  Aggravating Factors: Standing, Walking  Pain Intervention(s): Medication (See eMAR), Home medication      A's of Opioid Risk Assessment  Activity:Patient is able to perform ADL.   Analgesia: None   Adverse Effects: Patient denies constipation or sedation.  Aberrant Behavior:  reviewed with no aberrant drug seeking/taking behavior.      Patient denies any suicidal or homicidal ideations    Physical Therapy/Home Exercise: yes          Review of Systems   Constitutional: Negative.    HENT: Negative.     Eyes: Negative.    Respiratory: Negative.     Cardiovascular: Negative.    Gastrointestinal: Negative.    Endocrine: Negative.    Genitourinary: Negative.    Musculoskeletal:  Positive for arthralgias (Right hip) and gait problem.   Integumentary:  Negative.   Hematological: Negative.    Psychiatric/Behavioral: Negative.             Past Medical History:   Diagnosis Date    Benign essential HTN     Carpal tunnel syndrome, bilateral upper limbs     Chronic low back pain     Degeneration, intervertebral disc,  lumbosacral     GERD (gastroesophageal reflux disease)     Hypercholesterolemia     Insomnia     Iron deficiency anemia     Localized swelling, mass and lump, right lower limb     Lower extremity edema     Lumbar spondylosis     Prediabetes     Restless leg     Slow transit constipation     Spinal stenosis, lumbar     Vitamin D deficiency      Past Surgical History:   Procedure Laterality Date    CATARACT EXTRACTION Left 09/10/2020    CATARACT EXTRACTION Right 10/01/2020    Dr. Stephen Fox    CHOLECYSTECTOMY  1990    COSMETIC SURGERY      eyelid    EPIDURAL STEROID INJECTION INTO LUMBAR SPINE N/A 7/21/2022    Procedure: Injection-steroid-epidural-lumbar  L3-L4  CHRIS  NO SEDATION;  Surgeon: Esther Delgado MD;  [facility];  Service: Pain Management;  Laterality: N/A;  STATES HAD VAC  WILL BRING CARD    EPIDURAL STEROID INJECTION INTO LUMBAR SPINE Bilateral 3/16/2023    Procedure: L3-4 CHRIS;  Surgeon: Esther Delgado MD;  [facility];  Service: Pain Management;  Laterality: Bilateral;  NO ANESTHESIA    HIP ARTHROPLASTY Left 10/2015    repair of torn gluteus medius muscle via hip arthroscopy at [facility]. Yoli Lui    SHOULDER ARTHROSCOPY W/ ROTATOR CUFF REPAIR  2005    SINUS SURGERY  02/2011    TUBAL LIGATION      VASCULAR SURGERY  12/15/2017    R SSV Laser Ablation per Dr. Herman Bullard     Social History     Socioeconomic History    Marital status:     Number of children: 1   Tobacco Use    Smoking status: Never    Smokeless tobacco: Never   Substance and Sexual Activity    Alcohol use: Yes    Drug use: Never    Sexual activity: Yes     Family History   Problem Relation Age of Onset    Hypertension Mother     Coronary artery disease Father     Pulmonary embolism Brother     No Known Problems Son      Review of patient's allergies indicates:   Allergen Reactions    Ace inhibitors Other (See Comments)     has a current medication list which includes the  following prescription(s): aspirin, ezetimibe, ferrous sulfate, losartan, metformin, multivitamin, omeprazole, pyridoxine (vitamin b6), vitamin d, and zinc gluconate.      Objective:  Vitals:    03/29/23 0820   BP: (!) 147/57   Pulse: 70        Physical Exam  Nursing note reviewed.   Constitutional:       General: She is not in acute distress.     Appearance: Normal appearance. She is not ill-appearing, toxic-appearing or diaphoretic.   HENT:      Head: Normocephalic and atraumatic.      Nose: Nose normal.      Mouth/Throat:      Mouth: Mucous membranes are moist.   Eyes:      Extraocular Movements: Extraocular movements intact.      Pupils: Pupils are equal, round, and reactive to light.   Cardiovascular:      Rate and Rhythm: Normal rate and regular rhythm.      Heart sounds: Normal heart sounds.   Pulmonary:      Effort: Pulmonary effort is normal. No respiratory distress.      Breath sounds: Normal breath sounds. No stridor. No wheezing or rhonchi.   Abdominal:      General: Bowel sounds are normal.      Palpations: Abdomen is soft.   Musculoskeletal:         General: No swelling or deformity.      Cervical back: Normal and normal range of motion. No spasms or tenderness. No pain with movement. Normal range of motion.      Thoracic back: Normal.      Lumbar back: No spasms. Negative right straight leg raise test and negative left straight leg raise test. No scoliosis.      Right hip: Tenderness and bony tenderness present. Decreased range of motion. Decreased strength.      Right lower leg: No edema.      Left lower leg: No edema.   Skin:     General: Skin is warm.   Neurological:      General: No focal deficit present.      Mental Status: She is alert and oriented to person, place, and time. Mental status is at baseline.      Cranial Nerves: No cranial nerve deficit.      Sensory: Sensation is intact. No sensory deficit.      Coordination: Coordination normal.      Gait: Gait normal.      Deep Tendon Reflexes:  Reflexes are normal and symmetric.   Psychiatric:         Mood and Affect: Mood normal.         Behavior: Behavior normal.         Assessment:      1. Pain in unspecified hip    2. Low back pain, unspecified back pain laterality, unspecified chronicity, unspecified whether sciatica present    3. Lumbar radiculopathy    4. Postlaminectomy syndrome of lumbar region          Plan:    Patient is awaiting orthopedic evaluation April 12, 2023 in [facility] for intractable right hip pain  Patient will follow-up as needed for re-evaluation of lower back and lumbar radiculopathy       report:  Reviewed and consistent with medication use as prescribed.      The total time spent for evaluation and management on 03/29/2023 including reviewing separately obtained history, performing a medically appropriate exam and evaluation, documenting clinical information in the health record, independently interpreting results and communicating them to the patient/family/caregiver, and ordering medications/tests/procedures was between 15-29 minutes.    The above plan and management options were discussed at length with patient. Patient is in agreement with the above and verbalized understanding. It will be communicated with the referring physician via electronic record, fax, or mail.

## 2023-04-13 ENCOUNTER — HOSPITAL ENCOUNTER (OUTPATIENT)
Dept: RADIOLOGY | Facility: HOSPITAL | Age: 73
Discharge: HOME | End: 2023-04-13
Attending: OBSTETRICS & GYNECOLOGY
Payer: MEDICARE

## 2023-04-13 ENCOUNTER — OFFICE VISIT (OUTPATIENT)
Dept: OBSTETRICS AND GYNECOLOGY | Facility: CLINIC | Age: 73
End: 2023-04-13
Payer: MEDICARE

## 2023-04-13 VITALS
WEIGHT: 153 LBS | BODY MASS INDEX: 28.16 KG/M2 | HEIGHT: 62 IN | SYSTOLIC BLOOD PRESSURE: 136 MMHG | HEART RATE: 80 BPM | DIASTOLIC BLOOD PRESSURE: 70 MMHG

## 2023-04-13 DIAGNOSIS — Z12.4 SCREENING FOR MALIGNANT NEOPLASM OF THE CERVIX: ICD-10-CM

## 2023-04-13 DIAGNOSIS — N89.8 VAGINAL DISCHARGE: ICD-10-CM

## 2023-04-13 DIAGNOSIS — Z01.419 WOMEN'S ANNUAL ROUTINE GYNECOLOGICAL EXAMINATION: Primary | ICD-10-CM

## 2023-04-13 DIAGNOSIS — R10.2 VAGINAL PAIN: Primary | ICD-10-CM

## 2023-04-13 DIAGNOSIS — R10.2 VAGINAL PAIN: ICD-10-CM

## 2023-04-13 PROCEDURE — 99214 OFFICE O/P EST MOD 30 MIN: CPT | Mod: PBBFAC,25 | Performed by: OBSTETRICS & GYNECOLOGY

## 2023-04-13 PROCEDURE — 76856 US EXAM PELVIC COMPLETE: CPT | Mod: 26,,, | Performed by: RADIOLOGY

## 2023-04-13 PROCEDURE — G0123 SCREEN CERV/VAG THIN LAYER: HCPCS | Mod: TC,GCY | Performed by: OBSTETRICS & GYNECOLOGY

## 2023-04-13 PROCEDURE — G0101 PR CA SCREEN;PELVIC/BREAST EXAM: ICD-10-PCS | Mod: S$PBB,GZ,, | Performed by: OBSTETRICS & GYNECOLOGY

## 2023-04-13 PROCEDURE — 76856 US PELVIS COMP WITH TRANSVAG NON-OB (XPD): ICD-10-PCS | Mod: 26,,, | Performed by: RADIOLOGY

## 2023-04-13 PROCEDURE — 76830 TRANSVAGINAL US NON-OB: CPT | Mod: 26,,, | Performed by: RADIOLOGY

## 2023-04-13 PROCEDURE — 76830 US PELVIS COMP WITH TRANSVAG NON-OB (XPD): ICD-10-PCS | Mod: 26,,, | Performed by: RADIOLOGY

## 2023-04-13 PROCEDURE — G0101 CA SCREEN;PELVIC/BREAST EXAM: HCPCS | Mod: S$PBB,GZ,, | Performed by: OBSTETRICS & GYNECOLOGY

## 2023-04-13 PROCEDURE — 76856 US EXAM PELVIC COMPLETE: CPT | Mod: TC

## 2023-04-13 RX ORDER — METRONIDAZOLE 7.5 MG/G
1 GEL VAGINAL 2 TIMES DAILY
Qty: 70 G | Refills: 1 | Status: SHIPPED | OUTPATIENT
Start: 2023-04-13 | End: 2023-07-13

## 2023-04-13 RX ORDER — FLUCONAZOLE 200 MG/1
200 TABLET ORAL DAILY
Qty: 3 TABLET | Refills: 1 | Status: SHIPPED | OUTPATIENT
Start: 2023-04-13 | End: 2023-04-16

## 2023-04-17 LAB
GH SERPL-MCNC: NORMAL NG/ML
INSULIN SERPL-ACNC: NORMAL U[IU]/ML
LAB AP CLINICAL INFORMATION: NORMAL
LAB AP GYN INTERPRETATION: NEGATIVE
LAB AP PAP DISCLAIMER COMMENTS: NORMAL
RENIN PLAS-CCNC: NORMAL NG/ML/H

## 2023-04-17 NOTE — PROGRESS NOTES
Lynda Parr female  for   Chief Complaint   Patient presents with    Annual Exam     Cu/pap    Vaginal Discharge     C/o vaginal discharge off and on      OB History          1    Para   1    Term   1            AB        Living             SAB        IAB        Ectopic        Multiple        Live Births                      Past Medical History:   Diagnosis Date    Benign essential HTN     Carpal tunnel syndrome, bilateral upper limbs     Chronic low back pain     Degeneration, intervertebral disc, lumbosacral     GERD (gastroesophageal reflux disease)     Hypercholesterolemia     Insomnia     Iron deficiency anemia     Localized swelling, mass and lump, right lower limb     Lower extremity edema     Lumbar spondylosis     Prediabetes     Restless leg     Slow transit constipation     Spinal stenosis, lumbar     Vitamin D deficiency       Past Surgical History:   Procedure Laterality Date    CATARACT EXTRACTION Left 09/10/2020    CATARACT EXTRACTION Right 10/01/2020    Dr. Stephen Fox    CHOLECYSTECTOMY      COSMETIC SURGERY      eyelid    EPIDURAL STEROID INJECTION INTO LUMBAR SPINE N/A 2022    Procedure: Injection-steroid-epidural-lumbar  L3-L4  CHRIS  NO SEDATION;  Surgeon: Esther Delgado MD;  Location: [facility];  Service: Pain Management;  Laterality: N/A;  STATES HAD VAC  WILL BRING CARD    EPIDURAL STEROID INJECTION INTO LUMBAR SPINE Bilateral 3/16/2023    Procedure: L3-4 CHRIS;  Surgeon: Esther Delgado MD;  Location: [facility];  Service: Pain Management;  Laterality: Bilateral;  NO ANESTHESIA    HIP ARTHROPLASTY Left 10/2015    [facility] gluteus medius [facility]    SHOULDER ARTHROSCOPY W/ ROTATOR CUFF REPAIR  2005    SINUS SURGERY  2011    TUBAL LIGATION      VASCULAR SURGERY  12/15/2017    R SSV Laser Ablation per Dr. Herman Bullard      Review of patient's allergies indicates:   Allergen Reactions     Ace inhibitors Other (See Comments)             Physical exam:     General Appearance: healthy    Chest:chest clear, no wheezing, rales, normal symmetric air entry, Heart exam - S1, S2 normal, no murmur, no gallop, rate regular    Breast:  normal appearance, no masses or tenderness    Abdomen:Normal, benign.    Pelvic: Pelvic exam: normal external genitalia, vulva, vagina, cervix, uterus and adnexa, VULVA: normal appearing vulva with no masses, tenderness or lesions, CERVIX: normal appearing cervix without discharge or lesions, UTERUS: uterus is normal size, shape, consistency and nontender, ADNEXA: normal adnexa in size, nontender and no masses, RECTAL:negative and stool guaiac negative normal rectal, no masses, guaiac negative stool obtained.     Extremity:normal    Skin: normal exam        Assessment:   Problem List Items Addressed This Visit    None  Visit Diagnoses       Women's annual routine gynecological examination    -  Primary    Screening for malignant neoplasm of the cervix        Relevant Orders    ThinPrep Pap Test    Vaginal discharge        Relevant Medications    metroNIDAZOLE (METROGEL) 0.75 % (37.5mg/5 gram) vaginal gel             Plan:  A Pap smear was done today.  She had a mammogram in November.  She had a colonoscopy in 2020.  The patient does have mild bacterial vaginosis and was treated with Metrogel and Diflucan.  She is also having some vaginal pain, and a sonogram has been scheduled for further evaluation.

## 2023-04-24 ENCOUNTER — OFFICE VISIT (OUTPATIENT)
Dept: FAMILY MEDICINE | Facility: CLINIC | Age: 73
End: 2023-04-24
Payer: MEDICARE

## 2023-04-24 ENCOUNTER — PATIENT MESSAGE (OUTPATIENT)
Dept: FAMILY MEDICINE | Facility: CLINIC | Age: 73
End: 2023-04-24
Payer: MEDICARE

## 2023-04-24 VITALS
BODY MASS INDEX: 27.97 KG/M2 | SYSTOLIC BLOOD PRESSURE: 152 MMHG | HEART RATE: 71 BPM | DIASTOLIC BLOOD PRESSURE: 72 MMHG | HEIGHT: 62 IN | WEIGHT: 152 LBS | OXYGEN SATURATION: 100 %

## 2023-04-24 DIAGNOSIS — G47.62 NOCTURNAL LEG CRAMPS: ICD-10-CM

## 2023-04-24 DIAGNOSIS — Z98.1 HISTORY OF LUMBAR SPINAL FUSION: ICD-10-CM

## 2023-04-24 DIAGNOSIS — R73.03 PREDIABETES: ICD-10-CM

## 2023-04-24 DIAGNOSIS — K21.9 GASTROESOPHAGEAL REFLUX DISEASE, UNSPECIFIED WHETHER ESOPHAGITIS PRESENT: ICD-10-CM

## 2023-04-24 DIAGNOSIS — I10 BENIGN ESSENTIAL HTN: Primary | ICD-10-CM

## 2023-04-24 DIAGNOSIS — E78.00 HYPERCHOLESTEREMIA: ICD-10-CM

## 2023-04-24 DIAGNOSIS — D50.9 IRON DEFICIENCY ANEMIA, UNSPECIFIED IRON DEFICIENCY ANEMIA TYPE: ICD-10-CM

## 2023-04-24 LAB
ALBUMIN SERPL BCP-MCNC: 3.6 G/DL (ref 3.5–5)
ALBUMIN/GLOB SERPL: 1 {RATIO}
ALP SERPL-CCNC: 62 U/L (ref 55–142)
ALT SERPL W P-5'-P-CCNC: 31 U/L (ref 13–56)
ANION GAP SERPL CALCULATED.3IONS-SCNC: 5 MMOL/L (ref 7–16)
AST SERPL W P-5'-P-CCNC: 22 U/L (ref 15–37)
BASOPHILS # BLD AUTO: 0.05 K/UL (ref 0–0.2)
BASOPHILS NFR BLD AUTO: 0.6 % (ref 0–1)
BILIRUB SERPL-MCNC: 0.4 MG/DL (ref ?–1.2)
BUN SERPL-MCNC: 21 MG/DL (ref 7–18)
BUN/CREAT SERPL: 34 (ref 6–20)
CALCIUM SERPL-MCNC: 9.3 MG/DL (ref 8.5–10.1)
CHLORIDE SERPL-SCNC: 109 MMOL/L (ref 98–107)
CO2 SERPL-SCNC: 29 MMOL/L (ref 21–32)
CREAT SERPL-MCNC: 0.61 MG/DL (ref 0.55–1.02)
DIFFERENTIAL METHOD BLD: ABNORMAL
EGFR (NO RACE VARIABLE) (RUSH/TITUS): 96 ML/MIN/1.73M²
EOSINOPHIL # BLD AUTO: 0.1 K/UL (ref 0–0.5)
EOSINOPHIL NFR BLD AUTO: 1.2 % (ref 1–4)
ERYTHROCYTE [DISTWIDTH] IN BLOOD BY AUTOMATED COUNT: 13.2 % (ref 11.5–14.5)
EST. AVERAGE GLUCOSE BLD GHB EST-MCNC: 97 MG/DL
GLOBULIN SER-MCNC: 3.6 G/DL (ref 2–4)
GLUCOSE SERPL-MCNC: 102 MG/DL (ref 74–106)
HBA1C MFR BLD HPLC: 5.5 % (ref 4.5–6.6)
HCT VFR BLD AUTO: 36.5 % (ref 38–47)
HGB BLD-MCNC: 11.7 G/DL (ref 12–16)
IMM GRANULOCYTES # BLD AUTO: 0.03 K/UL (ref 0–0.04)
IMM GRANULOCYTES NFR BLD: 0.4 % (ref 0–0.4)
LYMPHOCYTES # BLD AUTO: 2.96 K/UL (ref 1–4.8)
LYMPHOCYTES NFR BLD AUTO: 35.7 % (ref 27–41)
MAGNESIUM SERPL-MCNC: 2 MG/DL (ref 1.7–2.3)
MCH RBC QN AUTO: 30.8 PG (ref 27–31)
MCHC RBC AUTO-ENTMCNC: 32.1 G/DL (ref 32–36)
MCV RBC AUTO: 96.1 FL (ref 80–96)
MONOCYTES # BLD AUTO: 0.66 K/UL (ref 0–0.8)
MONOCYTES NFR BLD AUTO: 8 % (ref 2–6)
MPC BLD CALC-MCNC: 10.1 FL (ref 9.4–12.4)
NEUTROPHILS # BLD AUTO: 4.49 K/UL (ref 1.8–7.7)
NEUTROPHILS NFR BLD AUTO: 54.1 % (ref 53–65)
NRBC # BLD AUTO: 0 X10E3/UL
NRBC, AUTO (.00): 0 %
PLATELET # BLD AUTO: 329 K/UL (ref 150–400)
POTASSIUM SERPL-SCNC: 4.4 MMOL/L (ref 3.5–5.1)
PROT SERPL-MCNC: 7.2 G/DL (ref 6.4–8.2)
RBC # BLD AUTO: 3.8 M/UL (ref 4.2–5.4)
SODIUM SERPL-SCNC: 139 MMOL/L (ref 136–145)
WBC # BLD AUTO: 8.29 K/UL (ref 4.5–11)

## 2023-04-24 PROCEDURE — 80053 COMPREHENSIVE METABOLIC PANEL: ICD-10-PCS | Mod: ,,, | Performed by: CLINICAL MEDICAL LABORATORY

## 2023-04-24 PROCEDURE — 85025 CBC WITH DIFFERENTIAL: ICD-10-PCS | Mod: ,,, | Performed by: CLINICAL MEDICAL LABORATORY

## 2023-04-24 PROCEDURE — 85025 COMPLETE CBC W/AUTO DIFF WBC: CPT | Mod: ,,, | Performed by: CLINICAL MEDICAL LABORATORY

## 2023-04-24 PROCEDURE — 80053 COMPREHEN METABOLIC PANEL: CPT | Mod: ,,, | Performed by: CLINICAL MEDICAL LABORATORY

## 2023-04-24 PROCEDURE — 83036 HEMOGLOBIN A1C: ICD-10-PCS | Mod: ,,, | Performed by: CLINICAL MEDICAL LABORATORY

## 2023-04-24 PROCEDURE — 99214 OFFICE O/P EST MOD 30 MIN: CPT | Mod: ,,, | Performed by: NURSE PRACTITIONER

## 2023-04-24 PROCEDURE — 83036 HEMOGLOBIN GLYCOSYLATED A1C: CPT | Mod: ,,, | Performed by: CLINICAL MEDICAL LABORATORY

## 2023-04-24 PROCEDURE — 83735 MAGNESIUM: ICD-10-PCS | Mod: ,,, | Performed by: CLINICAL MEDICAL LABORATORY

## 2023-04-24 PROCEDURE — 99214 PR OFFICE/OUTPT VISIT, EST, LEVL IV, 30-39 MIN: ICD-10-PCS | Mod: ,,, | Performed by: NURSE PRACTITIONER

## 2023-04-24 PROCEDURE — 83735 ASSAY OF MAGNESIUM: CPT | Mod: ,,, | Performed by: CLINICAL MEDICAL LABORATORY

## 2023-04-24 RX ORDER — OMEPRAZOLE 20 MG/1
20 CAPSULE, DELAYED RELEASE ORAL DAILY
Qty: 90 CAPSULE | Refills: 1 | Status: SHIPPED | OUTPATIENT
Start: 2023-04-24 | End: 2023-08-10

## 2023-04-24 RX ORDER — METFORMIN HYDROCHLORIDE 500 MG/1
500 TABLET ORAL 3 TIMES DAILY
Qty: 270 TABLET | Refills: 1 | Status: SHIPPED | OUTPATIENT
Start: 2023-04-24 | End: 2023-10-12 | Stop reason: SDUPTHER

## 2023-04-24 RX ORDER — EZETIMIBE 10 MG/1
10 TABLET ORAL DAILY
Qty: 90 TABLET | Refills: 1 | Status: SHIPPED | OUTPATIENT
Start: 2023-04-24 | End: 2023-06-01 | Stop reason: ALTCHOICE

## 2023-04-24 RX ORDER — LOSARTAN POTASSIUM 25 MG/1
50 TABLET ORAL DAILY
Qty: 180 TABLET | Refills: 2 | Status: SHIPPED | OUTPATIENT
Start: 2023-04-24 | End: 2023-08-10

## 2023-04-24 RX ORDER — METHOCARBAMOL 500 MG/1
500 TABLET, FILM COATED ORAL NIGHTLY PRN
Qty: 90 TABLET | Refills: 0 | Status: SHIPPED | OUTPATIENT
Start: 2023-04-24 | End: 2023-04-25 | Stop reason: SDUPTHER

## 2023-04-24 RX ORDER — FERROUS SULFATE 325(65) MG
325 TABLET ORAL
Qty: 90 TABLET | Refills: 1 | Status: SHIPPED | OUTPATIENT
Start: 2023-04-24 | End: 2023-10-12 | Stop reason: SDUPTHER

## 2023-04-24 NOTE — PROGRESS NOTES
Clinic note     Patient name: Lynda Parr is a 72 y.o. female   Chief compliant   Chief Complaint   Patient presents with    Medication Refill     Needs lab work. Also would like to talk about muscle spams in hands and feet.        Subjective     History of present illness   In clinic for routine follow up, medication refills and lab   She states she is having muscle cramping at night in BLE and bilateral hands; symptoms worse in RLE, symptoms seem to be worse on days when she has been more active  Hx of muscle cramps in the past for which she has taken OTC mag supplement which was effective until lately  Will obtain routine lab today in clinic  Will trial robaxin HS prn, dosing and possible side effects discussed   EMG BLE was ordered by Dr Davalos, neurology 5/26/23 to be done by Dr Hernandez in [place], this was not done due to scheduling   Will refer to Dr Carmona, Neurology, [facility] for EMG BLE  Hx of lumbar spondylosis and radiculopathy, history of TLIF L4-5 in October 2019 by Dr JUNI Clark    Hx of surgical repair right hip by Dr Kent, [facility] in [place] for tear right hip gluteus minimus  She states she is doing well with recovery, has started playing golf again and is more physically active than she has been in the last 7 months  She is followed by Dr Delgado for pain management, recent LESI  3/16/23  PMH: hypertension, hyperlipidemia, prediabetes, insomnia , ID anemia  Last A1c was 5.9, this will be rechecked today     Blood pressure is elevated in clinic; she routinely monitors at home and reports readings have been 130/70 or above   Will increase losartan to 50 mg daily, encouraged to continue to monitor home blood pressure      Social History     Tobacco Use    Smoking status: Never     Passive exposure: Never    Smokeless tobacco: Never   Substance Use Topics    Alcohol use: Yes     Alcohol/week: 4.0 standard drinks     Types: 4 Glasses of wine per week    Drug use: Never        Review of patient's allergies indicates:   Allergen Reactions    Ace inhibitors Other (See Comments)       Past Medical History:   Diagnosis Date    Benign essential HTN     Carpal tunnel syndrome, bilateral upper limbs     Chronic low back pain     Degeneration, intervertebral disc, lumbosacral     GERD (gastroesophageal reflux disease)     Hypercholesterolemia     Insomnia     Iron deficiency anemia     Localized swelling, mass and lump, right lower limb     Lower extremity edema     Lumbar spondylosis     Prediabetes     Restless leg     Slow transit constipation     Spinal stenosis, lumbar     Vitamin D deficiency        Past Surgical History:   Procedure Laterality Date    CATARACT EXTRACTION Left 09/10/2020    CATARACT EXTRACTION Right 10/01/2020    Dr. Stephen Fox    CHOLECYSTECTOMY      COSMETIC SURGERY      eyelid    EPIDURAL STEROID INJECTION INTO LUMBAR SPINE N/A 2022    Procedure: Injection-steroid-epidural-lumbar  L3-L4  CHRIS  NO SEDATION;  Surgeon: Esther Delgado MD;  Location: [facility];  Service: Pain Management;  Laterality: N/A;  STATES HAD VAC  WILL BRING CARD    EPIDURAL STEROID INJECTION INTO LUMBAR SPINE Bilateral 2023    Procedure: L3-4 CHRIS;  Surgeon: Esther Delgado MD;  Location: [facility];  Service: Pain Management;  Laterality: Bilateral;  NO ANESTHESIA    HIP ARTHROPLASTY Left 10/2015    repair of torn gluteus medius muscle [facility]    SHOULDER ARTHROSCOPY W/ ROTATOR CUFF REPAIR  2005    SINUS SURGERY  2011    TUBAL LIGATION      VASCULAR SURGERY  12/15/2017    R [facility] Laser Ablation per Dr. Herman Bullard        Family History   Problem Relation Age of Onset    Hypertension Mother             Coronary artery disease Father     Diabetes Father             Heart disease Father             Pulmonary embolism Brother     No Known Problems Son          Current Outpatient  Medications:     aspirin 81 MG Chew, Take 81 mg by mouth once daily., Disp: , Rfl:     ezetimibe (ZETIA) 10 mg tablet, Take 1 tablet (10 mg total) by mouth once daily., Disp: 90 tablet, Rfl: 1    ferrous sulfate (FEOSOL) 325 mg (65 mg iron) Tab tablet, Take 1 tablet (325 mg total) by mouth daily with breakfast., Disp: 90 tablet, Rfl: 1    losartan (COZAAR) 25 MG tablet, Take 2 tablets (50 mg total) by mouth once daily., Disp: 180 tablet, Rfl: 2    metFORMIN (GLUCOPHAGE) 500 MG tablet, Take 1 tablet (500 mg total) by mouth 3 (three) times daily., Disp: 270 tablet, Rfl: 1    methocarbamoL (ROBAXIN) 500 MG Tab, Take 1 tablet (500 mg total) by mouth nightly as needed (muscle cramps)., Disp: 90 tablet, Rfl: 0    metroNIDAZOLE (METROGEL) 0.75 % (37.5mg/5 gram) vaginal gel, Place 1 applicator vaginally 2 (two) times daily., Disp: 70 g, Rfl: 1    multivitamin (THERAGRAN) per tablet, Take 1 tablet by mouth once daily., Disp: , Rfl:     omeprazole (PRILOSEC) 20 MG capsule, Take 1 capsule (20 mg total) by mouth once daily., Disp: 90 capsule, Rfl: 1    pyridoxine, vitamin B6, (B-6) 100 MG Tab, Take 50 mg by mouth once daily., Disp: , Rfl:     vitamin D (VITAMIN D3) 1000 units Tab, Take 1,000 Units by mouth once daily., Disp: , Rfl:     zinc gluconate 50 mg tablet, Take 50 mg by mouth once daily., Disp: , Rfl:     Review of Systems   Constitutional:  Negative for appetite change, chills, fatigue, fever and unexpected weight change.   Respiratory:  Negative for cough and shortness of breath.    Cardiovascular:  Negative for chest pain, palpitations and leg swelling.   Gastrointestinal:  Negative for abdominal pain, change in bowel habit, constipation, diarrhea, nausea, vomiting and change in bowel habit.   Genitourinary:  Negative for dysuria and frequency.   Musculoskeletal:  Positive for arthralgias, back pain and leg pain. Negative for gait problem and myalgias.        Cramps, BLE and bilateral hands, right lower extremity  "worse    Neurological:  Negative for dizziness, syncope, light-headedness and headaches.   Psychiatric/Behavioral:  Negative for dysphoric mood and sleep disturbance. The patient is not nervous/anxious.      Objective     BP (!) 152/72   Pulse 71   Ht 5' 2" (1.575 m)   Wt 68.9 kg (152 lb)   SpO2 100%   BMI 27.80 kg/m²     Physical Exam   Constitutional: She is oriented to person, place, and time. No distress.   HENT:   Head: Atraumatic.   Mouth/Throat: Mucous membranes are moist.   Cardiovascular: Normal rate and regular rhythm. Pulmonary:      Effort: Pulmonary effort is normal. No respiratory distress.      Breath sounds: Normal breath sounds. No wheezing, rhonchi or rales.     Abdominal: Soft. Bowel sounds are normal. She exhibits no distension. There is no abdominal tenderness.   Musculoskeletal:         General: Normal range of motion.      Cervical back: Neck supple.      Right lower leg: No edema.      Left lower leg: No edema.   Neurological: She is alert and oriented to person, place, and time. Gait normal.   Skin: Skin is warm and dry.   Psychiatric: Her behavior is normal. Mood normal.     Lab Results   Component Value Date    WBC 6.99 10/05/2022    HGB 11.4 (L) 10/05/2022    HCT 35.7 (L) 10/05/2022    MCV 98.1 (H) 10/05/2022     10/05/2022       CMP  Sodium   Date Value Ref Range Status   10/05/2022 141 136 - 145 mmol/L Final     Potassium   Date Value Ref Range Status   10/05/2022 4.6 3.5 - 5.1 mmol/L Final     Chloride   Date Value Ref Range Status   10/05/2022 108 (H) 98 - 107 mmol/L Final     CO2   Date Value Ref Range Status   10/05/2022 28 21 - 32 mmol/L Final     Glucose   Date Value Ref Range Status   10/05/2022 89 74 - 106 mg/dL Final     BUN   Date Value Ref Range Status   10/05/2022 19 (H) 7 - 18 mg/dL Final     Creatinine   Date Value Ref Range Status   10/05/2022 0.68 0.55 - 1.02 mg/dL Final     Calcium   Date Value Ref Range Status   10/05/2022 9.1 8.5 - 10.1 mg/dL Final "     Total Protein   Date Value Ref Range Status   10/05/2022 7.4 6.4 - 8.2 g/dL Final     Albumin   Date Value Ref Range Status   10/05/2022 3.9 3.5 - 5.0 g/dL Final     Bilirubin, Total   Date Value Ref Range Status   10/05/2022 0.4 >0.0 - 1.2 mg/dL Final     Alk Phos   Date Value Ref Range Status   10/05/2022 65 55 - 142 U/L Final     AST   Date Value Ref Range Status   10/05/2022 17 15 - 37 U/L Final     ALT   Date Value Ref Range Status   10/05/2022 24 13 - 56 U/L Final     Anion Gap   Date Value Ref Range Status   10/05/2022 10 7 - 16 mmol/L Final     eGFR   Date Value Ref Range Status   05/18/2022 75 >=60 mL/min/1.73m² Final     Lab Results   Component Value Date    TSH 2.210 10/04/2021     Lab Results   Component Value Date    CHOL 206 (H) 10/05/2022    CHOL 185 10/04/2021     Lab Results   Component Value Date    HDL 87 (H) 10/05/2022    HDL 71 (H) 10/04/2021     Lab Results   Component Value Date    LDLCALC 107 10/05/2022    LDLCALC 101 10/04/2021     Lab Results   Component Value Date    TRIG 61 10/05/2022    TRIG 65 10/04/2021     Lab Results   Component Value Date    CHOLHDL 2.4 10/05/2022    CHOLHDL 2.6 10/04/2021     Lab Results   Component Value Date    HGBA1C 5.5 10/05/2022         Assessment and Plan   Benign essential HTN  -     CBC Auto Differential; Future; Expected date: 04/24/2023  -     Comprehensive Metabolic Panel; Future; Expected date: 04/24/2023  -     Magnesium; Future; Expected date: 04/24/2023  -     losartan (COZAAR) 25 MG tablet; Take 2 tablets (50 mg total) by mouth once daily.  Dispense: 180 tablet; Refill: 2    Prediabetes  -     Hemoglobin A1C; Future; Expected date: 04/24/2023  -     metFORMIN (GLUCOPHAGE) 500 MG tablet; Take 1 tablet (500 mg total) by mouth 3 (three) times daily.  Dispense: 270 tablet; Refill: 1    Hypercholesteremia  -     ezetimibe (ZETIA) 10 mg tablet; Take 1 tablet (10 mg total) by mouth once daily.  Dispense: 90 tablet; Refill: 1    Iron deficiency anemia,  unspecified iron deficiency anemia type  -     ferrous sulfate (FEOSOL) 325 mg (65 mg iron) Tab tablet; Take 1 tablet (325 mg total) by mouth daily with breakfast.  Dispense: 90 tablet; Refill: 1    Gastroesophageal reflux disease, unspecified whether esophagitis present  -     omeprazole (PRILOSEC) 20 MG capsule; Take 1 capsule (20 mg total) by mouth once daily.  Dispense: 90 capsule; Refill: 1    History of lumbar spinal fusion  -     Ambulatory referral/consult to Neurology; Future; Expected date: 05/01/2023    Nocturnal leg cramps  -     Ambulatory referral/consult to Neurology; Future; Expected date: 05/01/2023  -     methocarbamoL (ROBAXIN) 500 MG Tab; Take 1 tablet (500 mg total) by mouth nightly as needed (muscle cramps).  Dispense: 90 tablet; Refill: 0    BMI 27.0-27.9,adult          Patient Instructions  Patient Instructions   Lab obtained in clinic today, we will notify you of results and any necessary changes to plan of care   Referral to Dr Carmona, [place] for EMG study of bilateral lower extremities, someone will call you with date and time of appointment, if you have not heard from anyone in 1-2 weeks, call us here at clinic  Increase losartan to 50 mg daily  Check blood pressure in the am and again in the afternoon, keep written log and bring in clinic  in two weeks for me to review   If home readings are 120 systolic or less, reduce dose back down to 25 mg daily   Call clinic with any questions or concerns     Robaxin at bedtime as needed for muscle cramps               I have reviewed the encounter documentation and agree with the assessment and plan as put forth by the nurse practitioner.

## 2023-04-24 NOTE — PATIENT INSTRUCTIONS
Lab obtained in clinic today, we will notify you of results and any necessary changes to plan of care   Referral to Dr Carmona, [place] for EMG study of bilateral lower extremities, someone will call you with date and time of appointment, if you have not heard from anyone in 1-2 weeks, call us here at clinic  Increase losartan to 50 mg daily  Check blood pressure in the am and again in the afternoon, keep written log and bring in clinic  in two weeks for me to review   If home readings are 120 systolic or less, reduce dose back down to 25 mg daily   Call clinic with any questions or concerns     Robaxin at bedtime as needed for muscle cramps

## 2023-04-25 DIAGNOSIS — G47.62 NOCTURNAL LEG CRAMPS: ICD-10-CM

## 2023-04-25 RX ORDER — METHOCARBAMOL 500 MG/1
500 TABLET, FILM COATED ORAL NIGHTLY PRN
Qty: 90 TABLET | Refills: 0 | Status: SHIPPED | OUTPATIENT
Start: 2023-04-25 | End: 2023-07-13

## 2023-04-27 ENCOUNTER — PATIENT MESSAGE (OUTPATIENT)
Dept: FAMILY MEDICINE | Facility: CLINIC | Age: 73
End: 2023-04-27
Payer: MEDICARE

## 2023-05-01 ENCOUNTER — PATIENT MESSAGE (OUTPATIENT)
Dept: FAMILY MEDICINE | Facility: CLINIC | Age: 73
End: 2023-05-01
Payer: MEDICARE

## 2023-05-09 DIAGNOSIS — Z71.89 COMPLEX CARE COORDINATION: ICD-10-CM

## 2023-05-10 ENCOUNTER — PATIENT MESSAGE (OUTPATIENT)
Dept: FAMILY MEDICINE | Facility: CLINIC | Age: 73
End: 2023-05-10
Payer: MEDICARE

## 2023-06-01 ENCOUNTER — PATIENT MESSAGE (OUTPATIENT)
Dept: FAMILY MEDICINE | Facility: CLINIC | Age: 73
End: 2023-06-01
Payer: MEDICARE

## 2023-06-01 DIAGNOSIS — E78.00 HYPERCHOLESTEREMIA: Primary | ICD-10-CM

## 2023-06-01 RX ORDER — PRAVASTATIN SODIUM 10 MG/1
10 TABLET ORAL DAILY
Qty: 90 TABLET | Refills: 0 | Status: SHIPPED | OUTPATIENT
Start: 2023-06-01 | End: 2023-07-13

## 2023-06-16 ENCOUNTER — PATIENT MESSAGE (OUTPATIENT)
Dept: FAMILY MEDICINE | Facility: CLINIC | Age: 73
End: 2023-06-16
Payer: MEDICARE

## 2023-07-11 NOTE — PROGRESS NOTES
[place] [facility]      PATIENT NAME: Lynda Parr   : 1950    AGE: 72 y.o. DATE: 2023   MRN: [id number]        Reason for Visit / Chief Complaint: Medicare AWV (Initial Medicare AWV  )        Lynda Parr presents for an Initial Medicare AWV today.     The following components were reviewed and updated:    Medical/Social/Family History:  Past Medical History:   Diagnosis Date    Benign essential HTN     Carpal tunnel syndrome, bilateral upper limbs     Chronic low back pain     Degeneration, intervertebral disc, lumbosacral     GERD (gastroesophageal reflux disease)     Hypercholesterolemia     Insomnia     Iron deficiency anemia     Localized swelling, mass and lump, right lower limb     Lower extremity edema     Lumbar spondylosis     Prediabetes     Restless leg     Slow transit constipation     Spinal stenosis, lumbar     Vitamin D deficiency         Family History   Problem Relation Age of Onset    Hypertension Mother             Coronary artery disease Father     Diabetes Father             Heart disease Father             Pulmonary embolism Brother     No Known Problems Son         Past Surgical History:   Procedure Laterality Date    CATARACT EXTRACTION Left 09/10/2020    CATARACT EXTRACTION Right 10/01/2020    Dr. Stephen Fox    CHOLECYSTECTOMY      COSMETIC SURGERY      eyelid    EPIDURAL STEROID INJECTION INTO LUMBAR SPINE N/A 2022    Procedure: Injection-steroid-epidural-lumbar  L3-L4  CHRIS  NO SEDATION;  Surgeon: Esther Delgado MD;  Location: [facility];  Service: Pain Management;  Laterality: N/A;  STATES HAD VAC  WILL BRING CARD    EPIDURAL STEROID INJECTION INTO LUMBAR SPINE Bilateral 2023    Procedure: L3-4 CHRIS;  Surgeon: Esther Delgado MD;  Location: [facility];  Service: Pain Management;  Laterality: Bilateral;  NO ANESTHESIA    HIP ARTHROPLASTY Left 10/2015    repair of torn gluteus medius  muscle via hip arthroscopy at [facility], Dr. Kent    HIP ARTHROPLASTY Right 2023    repair of tendon at [facility]    LUMBAR DISCECTOMY  2019    SHOULDER ARTHROSCOPY W/ ROTATOR CUFF REPAIR  2005    SINUS SURGERY  02/2011    TUBAL LIGATION      VASCULAR SURGERY  12/15/2017    R SSV Laser Ablation per Dr. Herman Bullard       Social History     Tobacco Use   Smoking Status Never    Passive exposure: Never   Smokeless Tobacco Never       Social History     Substance and Sexual Activity   Alcohol Use Yes    Alcohol/week: 4.0 standard drinks    Types: 4 Glasses of wine per week         Allergies and Current Medications     Review of patient's allergies indicates:   Allergen Reactions    Ace inhibitors Other (See Comments)       Current Outpatient Medications:     aspirin 81 MG Chew, Take 81 mg by mouth once daily., Disp: , Rfl:     ferrous sulfate (FEOSOL) 325 mg (65 mg iron) Tab tablet, Take 1 tablet (325 mg total) by mouth daily with breakfast., Disp: 90 tablet, Rfl: 1    losartan (COZAAR) 25 MG tablet, Take 2 tablets (50 mg total) by mouth once daily., Disp: 180 tablet, Rfl: 2    metFORMIN (GLUCOPHAGE) 500 MG tablet, Take 1 tablet (500 mg total) by mouth 3 (three) times daily., Disp: 270 tablet, Rfl: 1    multivitamin (THERAGRAN) per tablet, Take 1 tablet by mouth once daily., Disp: , Rfl:     omega-3 fatty acids/fish oil (FISH OIL-OMEGA-3 FATTY ACIDS) 300-1,000 mg capsule, Take 1 capsule by mouth once daily., Disp: , Rfl:     omeprazole (PRILOSEC) 20 MG capsule, Take 1 capsule (20 mg total) by mouth once daily., Disp: 90 capsule, Rfl: 1    pyridoxine, vitamin B6, (B-6) 100 MG Tab, Take 50 mg by mouth once daily., Disp: , Rfl:     zinc gluconate 50 mg tablet, Take 50 mg by mouth once daily., Disp: , Rfl:     methocarbamoL (ROBAXIN) 500 MG Tab, Take 1 tablet (500 mg total) by mouth nightly as needed (muscle cramps). (Patient not taking: Reported on 7/13/2023), Disp: 90 tablet, Rfl: 0     metroNIDAZOLE (METROGEL) 0.75 % (37.5mg/5 gram) vaginal gel, Place 1 applicator vaginally 2 (two) times daily. (Patient not taking: Reported on 7/13/2023), Disp: 70 g, Rfl: 1    pravastatin (PRAVACHOL) 10 MG tablet, Take 1 tablet (10 mg total) by mouth once daily. (Patient not taking: Reported on 7/13/2023), Disp: 90 tablet, Rfl: 0    vitamin D (VITAMIN D3) 1000 units Tab, Take 1,000 Units by mouth once daily., Disp: , Rfl:       Health Risk Assessment   Fall Risk: no   Obesity: BMI Body mass index is 27.11 kg/m².   Advance Directive:  Has an advanced directive.  Copy requested for chart   Depression: PHQ9- 0   HTN: DASH diet, exercise, weight management, med compliance, home BP monitoring, and follow-up discussed.   Prediabetes: diabetic diet, glucose monitoring, activity level, weight management, med compliance, and follow-up discussed.  STI: not at risk   Statin Use: yes      Health Maintenance   Last eye exam: 06/2023 with Dr. oFx, will request report   Last CV screen with lipids: 10/05/2022   Diabetes screening with fasting glucose or A1c: 04/24/2023   Last pap/pelvic: advanced age   Last Mammogram: 11/09/2022  DEXA: 10/19/2021              Colonoscopy: 10/05/2020  Repeat in 3 years   Flu Vaccine: 11/14/2022   Pneumonia vaccines: 13- 01/18/2017 23- 01/11/2018   COVID vaccine: 01/11/2021 02/11/2021 11/02/2021 04/05/2022   Hep B vaccine: NA  AAA screening: NA   HIV Screening: not high risk  Hepatitis C Screen: 10/14/2019  Low Dose CT Scan: NA    Health Maintenance Topics with due status: Not Due       Topic Last Completion Date    TETANUS VACCINE 08/30/2020    DEXA Scan 10/19/2021    Lipid Panel 10/05/2022    Mammogram 11/09/2022    Influenza Vaccine 11/14/2022    Hemoglobin A1c (Prediabetes) 04/24/2023     Health Maintenance Due   Topic Date Due    COVID-19 Vaccine (5 - Moderna series) 05/31/2022    Colorectal Cancer Screening  10/05/2023         Lab results available in Epic or see dates from [facility] above:    Lab Results   Component Value Date    CHOL 206 (H) 10/05/2022    CHOL 185 10/04/2021     Lab Results   Component Value Date    HDL 87 (H) 10/05/2022    HDL 71 (H) 10/04/2021     Lab Results   Component Value Date    LDLCALC 107 10/05/2022    LDLCALC 101 10/04/2021     Lab Results   Component Value Date    TRIG 61 10/05/2022    TRIG 65 10/04/2021         Lab Results   Component Value Date    HGBA1C 5.5 04/24/2023       Sodium   Date Value Ref Range Status   04/24/2023 139 136 - 145 mmol/L Final     Potassium   Date Value Ref Range Status   04/24/2023 4.4 3.5 - 5.1 mmol/L Final     Chloride   Date Value Ref Range Status   04/24/2023 109 (H) 98 - 107 mmol/L Final     CO2   Date Value Ref Range Status   04/24/2023 29 21 - 32 mmol/L Final     Glucose   Date Value Ref Range Status   04/24/2023 102 74 - 106 mg/dL Final     BUN   Date Value Ref Range Status   04/24/2023 21 (H) 7 - 18 mg/dL Final     Creatinine   Date Value Ref Range Status   04/24/2023 0.61 0.55 - 1.02 mg/dL Final     Calcium   Date Value Ref Range Status   04/24/2023 9.3 8.5 - 10.1 mg/dL Final     Total Protein   Date Value Ref Range Status   04/24/2023 7.2 6.4 - 8.2 g/dL Final     Albumin   Date Value Ref Range Status   04/24/2023 3.6 3.5 - 5.0 g/dL Final     Bilirubin, Total   Date Value Ref Range Status   04/24/2023 0.4 >0.0 - 1.2 mg/dL Final     Alk Phos   Date Value Ref Range Status   04/24/2023 62 55 - 142 U/L Final     AST   Date Value Ref Range Status   04/24/2023 22 15 - 37 U/L Final     ALT   Date Value Ref Range Status   04/24/2023 31 13 - 56 U/L Final     Anion Gap   Date Value Ref Range Status   04/24/2023 5 (L) 7 - 16 mmol/L Final     eGFR   Date Value Ref Range Status   05/18/2022 75 >=60 mL/min/1.73m² Final       Incontinence  Bowel: no  Bladder: no      Care Team:  Dr. Carmona   Neurology        Dr. Fox  Ophthalmology          **See Completed Assessments for Annual Wellness visit within the encounter summary    The following  "assessments were completed & reviewed:  Depression Screening  Cognitive function Screening  Timed Get Up Test  Whisper Test  Vision Screen  Health Risk Assessment  Checklist of ADLs and IADLs  Opioid Risk Assessment        Objective  Vitals:    07/13/23 0839   BP: 139/76   Pulse: 69   Resp: 12   SpO2: 99%   Weight: 67.2 kg (148 lb 3.2 oz)   Height: 5' 2" (1.575 m)   PainSc: 0-No pain      Body mass index is 27.11 kg/m².  Ideal body weight: 50.1 kg (110 lb 7.2 oz)       Physical Exam  Constitutional: She is oriented to person, place, and time. No distress.   HENT:   Head: Atraumatic.   Mouth/Throat: Mucous membranes are moist.   Cardiovascular: Normal rate and regular rhythm. Pulmonary:      Effort: Pulmonary effort is normal. No respiratory distress.      Breath sounds: Normal breath sounds. No wheezing, rhonchi or rales.      Abdominal: Soft. Bowel sounds are normal. She exhibits no distension. There is no abdominal tenderness.   Musculoskeletal:         General: Normal range of motion.      Cervical back: Neck supple.      Right lower leg: No edema.      Left lower leg: No edema.   Neurological: She is alert and oriented to person, place, and time. Gait normal.   Skin: Skin is warm and dry.   Psychiatric: Her behavior is normal. Mood normal.     Assessment:     1. Encounter for initial annual wellness visit (AWV) in Medicare patient    2. Benign essential HTN    3. Hypercholesteremia    4. Prediabetes    5. BMI 27.0-27.9,adult    6. Statin intolerance       Problem List Items Addressed This Visit          Cardiac/Vascular    Benign essential HTN    Hypercholesteremia       Endocrine    Prediabetes     Other Visit Diagnoses       Encounter for initial annual wellness visit (AWV) in Medicare patient    -  Primary    BMI 27.0-27.9,adult        Statin intolerance                Plan:    Referrals:   No referrals at this time     Pt reports intolerance to Pravachol and Zetia; she was instructed by Dr Carmona to stop " statin medication, avoid taking statin in the future. She is currently taking fish oil and niacin directed by Dr Carmona, she has follow up with him in a few weeks and he is planning to recheck lipid at that time       Discussed and provided with a screening schedule and personal prevention plan in accordance with USPSTF age appropriate recommendations and Medicare screening guidelines.   Education, counseling, and referrals were provided as needed.  After Visit Summary printed and given to patient which includes written education and a list of any referrals if indicated.     Education including diet, exercise, falls and advanced directives discussed with patient and patient verbalized understanding.      F/u plan for yearly AWV.    Signature: Vicky Peres, FNP-BC

## 2023-07-13 ENCOUNTER — OFFICE VISIT (OUTPATIENT)
Dept: FAMILY MEDICINE | Facility: CLINIC | Age: 73
End: 2023-07-13
Payer: MEDICARE

## 2023-07-13 VITALS
OXYGEN SATURATION: 99 % | HEART RATE: 69 BPM | SYSTOLIC BLOOD PRESSURE: 139 MMHG | WEIGHT: 148.19 LBS | RESPIRATION RATE: 12 BRPM | DIASTOLIC BLOOD PRESSURE: 76 MMHG | HEIGHT: 62 IN | BODY MASS INDEX: 27.27 KG/M2

## 2023-07-13 DIAGNOSIS — Z00.00 ENCOUNTER FOR INITIAL ANNUAL WELLNESS VISIT (AWV) IN MEDICARE PATIENT: Primary | ICD-10-CM

## 2023-07-13 DIAGNOSIS — Z78.9 STATIN INTOLERANCE: ICD-10-CM

## 2023-07-13 DIAGNOSIS — R73.03 PREDIABETES: ICD-10-CM

## 2023-07-13 DIAGNOSIS — E78.00 HYPERCHOLESTEREMIA: ICD-10-CM

## 2023-07-13 DIAGNOSIS — I10 BENIGN ESSENTIAL HTN: ICD-10-CM

## 2023-07-13 PROCEDURE — G0438 PPPS, INITIAL VISIT: HCPCS | Mod: ,,, | Performed by: NURSE PRACTITIONER

## 2023-07-13 PROCEDURE — G0438 PR WELCOME MEDICARE ANNUAL WELLNESS INITIAL VISIT: ICD-10-PCS | Mod: ,,, | Performed by: NURSE PRACTITIONER

## 2023-07-13 RX ORDER — AMOXICILLIN 500 MG
1 CAPSULE ORAL DAILY
COMMUNITY
End: 2023-08-28 | Stop reason: ALTCHOICE

## 2023-07-13 NOTE — PATIENT INSTRUCTIONS
Counseling and Referral of Other Preventative  (Italic type indicates deductible and co-insurance are waived)    Patient Name: Lynda Parr  Today's Date: 7/13/2023    Health Maintenance         Date Due Completion Date    COVID-19 Vaccine (5 - Moderna series) 05/31/2022 4/5/2022    Diabetes Urine Screening 04/05/2023 4/5/2022    Colorectal Cancer Screening 10/05/2023 10/5/2020 (Done)    Override on 10/5/2020: Done (polyps, Dr. Koch, repeat 3 yrs)    Influenza Vaccine (1) 09/01/2023 11/14/2022    Mammogram 11/09/2023 11/9/2022    Hemoglobin A1c (Prediabetes) 04/24/2024 4/24/2023    DEXA Scan 10/19/2024 10/19/2021    Lipid Panel 10/05/2027 10/5/2022    TETANUS VACCINE 08/30/2030 8/30/2020          No orders of the defined types were placed in this encounter.

## 2023-07-17 ENCOUNTER — PATIENT OUTREACH (OUTPATIENT)
Dept: ADMINISTRATIVE | Facility: HOSPITAL | Age: 73
End: 2023-07-17

## 2023-07-17 NOTE — LETTER
AUTHORIZATION FOR RELEASE OF   CONFIDENTIAL INFORMATION    Dear Dr Fox and staff,    We are seeing Lynda Parr, date of birth 1950, in the clinic at [facility]. BERNARD Tai is the patient's PCP. Lynda Parr has an outstanding lab/procedure at the time we reviewed her chart. In order to help keep her health information updated, she has authorized us to request the following medical record(s):        (  )  MAMMOGRAM                                      (  )  COLONOSCOPY      (  )  PAP SMEAR                                          (  )  OUTSIDE LAB RESULTS     (  )  DEXA SCAN                                          ( XX )  EYE EXAM            (  )  FOOT EXAM                                          (  )  ENTIRE RECORD     (  )  OUTSIDE IMMUNIZATIONS                 (  )  _______________         Please fax records to [facility], Sara A Askew, FNP, [phone]     If you have any questions, please contact [facility] at [phone].           Patient Name: Lynda Parr  : 1950  Patient Phone #: [phone]

## 2023-07-21 ENCOUNTER — PATIENT OUTREACH (OUTPATIENT)
Dept: ADMINISTRATIVE | Facility: HOSPITAL | Age: 73
End: 2023-07-21

## 2023-08-10 ENCOUNTER — OFFICE VISIT (OUTPATIENT)
Dept: FAMILY MEDICINE | Facility: CLINIC | Age: 73
End: 2023-08-10
Payer: MEDICARE

## 2023-08-10 VITALS
BODY MASS INDEX: 26.93 KG/M2 | DIASTOLIC BLOOD PRESSURE: 68 MMHG | SYSTOLIC BLOOD PRESSURE: 129 MMHG | TEMPERATURE: 98 F | WEIGHT: 146.31 LBS | HEIGHT: 62 IN

## 2023-08-10 DIAGNOSIS — Z20.822 SUSPECTED 2019 NOVEL CORONAVIRUS INFECTION: ICD-10-CM

## 2023-08-10 DIAGNOSIS — J06.9 UPPER RESPIRATORY TRACT INFECTION, UNSPECIFIED TYPE: Primary | ICD-10-CM

## 2023-08-10 DIAGNOSIS — K21.9 GASTROESOPHAGEAL REFLUX DISEASE, UNSPECIFIED WHETHER ESOPHAGITIS PRESENT: ICD-10-CM

## 2023-08-10 LAB
CTP QC/QA: YES
FLUAV AG NPH QL: NEGATIVE
FLUBV AG NPH QL: NEGATIVE
SARS-COV-2 AG RESP QL IA.RAPID: NEGATIVE

## 2023-08-10 PROCEDURE — 99213 OFFICE O/P EST LOW 20 MIN: CPT | Mod: ,,, | Performed by: NURSE PRACTITIONER

## 2023-08-10 PROCEDURE — 87428 SARSCOV & INF VIR A&B AG IA: CPT | Mod: RHCUB | Performed by: NURSE PRACTITIONER

## 2023-08-10 PROCEDURE — 99213 PR OFFICE/OUTPT VISIT, EST, LEVL III, 20-29 MIN: ICD-10-PCS | Mod: ,,, | Performed by: NURSE PRACTITIONER

## 2023-08-10 RX ORDER — HYDROGEN PEROXIDE 3 %
20 SOLUTION, NON-ORAL MISCELLANEOUS
Qty: 90 CAPSULE | Refills: 0 | Status: SHIPPED | OUTPATIENT
Start: 2023-08-10 | End: 2023-10-12 | Stop reason: SDUPTHER

## 2023-08-10 RX ORDER — HYDROGEN PEROXIDE 3 %
20 SOLUTION, NON-ORAL MISCELLANEOUS
COMMUNITY
End: 2023-08-10 | Stop reason: SDUPTHER

## 2023-08-10 RX ORDER — AMOXICILLIN 875 MG/1
875 TABLET, FILM COATED ORAL 2 TIMES DAILY
Qty: 20 TABLET | Refills: 0 | Status: SHIPPED | OUTPATIENT
Start: 2023-08-10 | End: 2023-08-20

## 2023-08-10 RX ORDER — FLUTICASONE PROPIONATE 50 MCG
2 SPRAY, SUSPENSION (ML) NASAL DAILY
Qty: 16 G | Refills: 1 | Status: SHIPPED | OUTPATIENT
Start: 2023-08-10 | End: 2023-10-12 | Stop reason: SDUPTHER

## 2023-08-10 RX ORDER — MELOXICAM 7.5 MG/1
7.5 TABLET ORAL DAILY
Qty: 90 TABLET | Refills: 0 | Status: SHIPPED | OUTPATIENT
Start: 2023-08-10 | End: 2023-10-12 | Stop reason: SDUPTHER

## 2023-08-10 RX ORDER — MULTIVIT WITH IRON,MINERALS
100 TABLET ORAL NIGHTLY
COMMUNITY
End: 2023-12-07

## 2023-08-10 NOTE — PROGRESS NOTES
"Clinic note     Patient name: Lynda Parr is a 72 y.o. female   Chief compliant   Chief Complaint   Patient presents with    Sinus Problem     Started Sunday night. Sinus drainage, some body aches, no fever. Golf group has had "sinus"  Would like a rx a mobic 15mg  and a rx for nexium        Subjective     History of present illness   In clinic for evaluation of sinus congestion and body aches which started four days ago  States she played golf with some friends who had similar symptoms last week     Past Medical History: HTN, HLD, RLS, insomnia iron def anemia GERD, prediabetes, Vit d def, chronic low back pain     Neurology: Dr Carmona  Pain management Dr Delgado     Hx of FESS by Dr Larose 2011    Request refills on mobic and Nexium while in clinic         Social History     Tobacco Use    Smoking status: Never     Passive exposure: Never    Smokeless tobacco: Never   Substance Use Topics    Alcohol use: Yes     Alcohol/week: 4.0 standard drinks of alcohol     Types: 4 Glasses of wine per week    Drug use: Never       Review of patient's allergies indicates:   Allergen Reactions    Ace inhibitors Other (See Comments)       Past Medical History:   Diagnosis Date    Benign essential HTN     Carpal tunnel syndrome, bilateral upper limbs     Chronic low back pain     Degeneration, intervertebral disc, lumbosacral     GERD (gastroesophageal reflux disease)     Hypercholesterolemia     Insomnia     Iron deficiency anemia     Localized swelling, mass and lump, right lower limb     Lower extremity edema     Lumbar spondylosis     Prediabetes     Restless leg     Slow transit constipation     Spinal stenosis, lumbar     Vitamin D deficiency        Past Surgical History:   Procedure Laterality Date    CATARACT EXTRACTION Left 09/10/2020    CATARACT EXTRACTION Right 10/01/2020    Dr. Stephen Fox    CHOLECYSTECTOMY  1990    COSMETIC SURGERY      eyelid    EPIDURAL STEROID INJECTION INTO LUMBAR SPINE N/A 07/21/2022    " Procedure: Injection-steroid-epidural-lumbar  L3-L4  CHRIS  NO SEDATION;  Surgeon: Esther Delgado MD;  Location: [facility];  Service: Pain Management;  Laterality: N/A;  STATES HAD VAC  WILL BRING CARD    EPIDURAL STEROID INJECTION INTO LUMBAR SPINE Bilateral 2023    Procedure: L3-4 CHRIS;  Surgeon: Esther Delgado MD;  Location: [facility];  Service: Pain Management;  Laterality: Bilateral;  NO ANESTHESIA    HIP ARTHROPLASTY Left 10/2015    repair of torn gluteus medius muscle via hip arthroscopy at [facility], Dr. Kent    HIP ARTHROPLASTY Right     repair of tendon at [facility]    LUMBAR DISCECTOMY  2019    SHOULDER ARTHROSCOPY W/ ROTATOR CUFF REPAIR  2005    SINUS SURGERY  2011    TUBAL LIGATION      VASCULAR SURGERY  12/15/2017    R SSV Laser Ablation per Dr. Herman Bullard        Family History   Problem Relation Age of Onset    Hypertension Mother             Coronary artery disease Father     Diabetes Father             Heart disease Father             Pulmonary embolism Brother     No Known Problems Son          Current Outpatient Medications:     aspirin 81 MG Chew, Take 81 mg by mouth once daily., Disp: , Rfl:     ferrous sulfate (FEOSOL) 325 mg (65 mg iron) Tab tablet, Take 1 tablet (325 mg total) by mouth daily with breakfast., Disp: 90 tablet, Rfl: 1    metFORMIN (GLUCOPHAGE) 500 MG tablet, Take 1 tablet (500 mg total) by mouth 3 (three) times daily., Disp: 270 tablet, Rfl: 1    multivitamin (THERAGRAN) per tablet, Take 1 tablet by mouth once daily., Disp: , Rfl:     niacin 100 MG Tab, Take 100 mg by mouth every evening., Disp: , Rfl:     omega-3 fatty acids/fish oil (FISH OIL-OMEGA-3 FATTY ACIDS) 300-1,000 mg capsule, Take 1 capsule by mouth once daily. 6,000mg, Disp: , Rfl:     pyridoxine, vitamin B6, (B-6) 100 MG Tab, Take 50 mg by mouth once daily., Disp: , Rfl:     vitamin D (VITAMIN D3) 1000 units Tab, Take 1,000 Units  "by mouth once daily., Disp: , Rfl:     zinc gluconate 50 mg tablet, Take 50 mg by mouth once daily., Disp: , Rfl:     amoxicillin (AMOXIL) 875 MG tablet, Take 1 tablet (875 mg total) by mouth 2 (two) times daily. for 10 days, Disp: 20 tablet, Rfl: 0    esomeprazole (NEXIUM) 20 MG capsule, Take 1 capsule (20 mg total) by mouth before breakfast., Disp: 90 capsule, Rfl: 0    fluticasone propionate (FLONASE) 50 mcg/actuation nasal spray, 2 sprays (100 mcg total) by Each Nostril route once daily., Disp: 16 g, Rfl: 1    meloxicam (MOBIC) 7.5 MG tablet, Take 1 tablet (7.5 mg total) by mouth once daily. Take with food, Disp: 90 tablet, Rfl: 0    Review of Systems   Constitutional:  Negative for chills and fever.   HENT:  Positive for nasal congestion and sinus pressure/congestion. Negative for sore throat.    Respiratory:  Negative for cough and shortness of breath.    Cardiovascular:  Negative for chest pain.   Gastrointestinal:  Negative for diarrhea, nausea and vomiting.   Musculoskeletal:  Positive for arthralgias and myalgias.   Neurological:  Negative for headaches.       Objective     /68   Temp 97.8 °F (36.6 °C)   Ht 5' 2" (1.575 m)   Wt 66.4 kg (146 lb 4.8 oz)   BMI 26.76 kg/m²     Physical Exam   Constitutional: She is oriented to person, place, and time. No distress.   HENT:   Head: Atraumatic.   Right Ear: Tympanic membrane and ear canal normal.   Left Ear: Tympanic membrane and ear canal normal.   Nose: Mucosal edema present. Right sinus exhibits maxillary sinus tenderness. Left sinus exhibits maxillary sinus tenderness.   Mouth/Throat: Mucous membranes are moist. Oropharyngeal erythema present. No oropharyngeal exudate.   Eyes: Conjunctivae are normal.   Cardiovascular: Normal rate and regular rhythm. Pulmonary:      Effort: No respiratory distress.      Breath sounds: Normal breath sounds. No wheezing, rhonchi or rales.     Abdominal: Soft. There is no abdominal tenderness.   Musculoskeletal:      " Cervical back: Neck supple.   Neurological: She is alert and oriented to person, place, and time. Gait normal.   Skin: Skin is warm and dry.   Psychiatric: Her behavior is normal. Mood normal.     Component Ref Range & Units 09:51 1 yr ago   SARS Coronavirus 2 Antigen Negative Negative     Rapid Influenza A Ag Negative Negative     Rapid Influenza B Ag Negative Negative      Acceptable  Yes  Yes               Specimen Collected: 08/10/23 09:51           Lab Results   Component Value Date    WBC 8.29 04/24/2023    HGB 11.7 (L) 04/24/2023    HCT 36.5 (L) 04/24/2023    MCV 96.1 (H) 04/24/2023     04/24/2023       CMP  Sodium   Date Value Ref Range Status   04/24/2023 139 136 - 145 mmol/L Final     Potassium   Date Value Ref Range Status   04/24/2023 4.4 3.5 - 5.1 mmol/L Final     Chloride   Date Value Ref Range Status   04/24/2023 109 (H) 98 - 107 mmol/L Final     CO2   Date Value Ref Range Status   04/24/2023 29 21 - 32 mmol/L Final     Glucose   Date Value Ref Range Status   04/24/2023 102 74 - 106 mg/dL Final     BUN   Date Value Ref Range Status   04/24/2023 21 (H) 7 - 18 mg/dL Final     Creatinine   Date Value Ref Range Status   04/24/2023 0.61 0.55 - 1.02 mg/dL Final     Calcium   Date Value Ref Range Status   04/24/2023 9.3 8.5 - 10.1 mg/dL Final     Total Protein   Date Value Ref Range Status   04/24/2023 7.2 6.4 - 8.2 g/dL Final     Albumin   Date Value Ref Range Status   04/24/2023 3.6 3.5 - 5.0 g/dL Final     Bilirubin, Total   Date Value Ref Range Status   04/24/2023 0.4 >0.0 - 1.2 mg/dL Final     Alk Phos   Date Value Ref Range Status   04/24/2023 62 55 - 142 U/L Final     AST   Date Value Ref Range Status   04/24/2023 22 15 - 37 U/L Final     ALT   Date Value Ref Range Status   04/24/2023 31 13 - 56 U/L Final     Anion Gap   Date Value Ref Range Status   04/24/2023 5 (L) 7 - 16 mmol/L Final     eGFR   Date Value Ref Range Status   05/18/2022 75 >=60 mL/min/1.73m² Final     Lab  Results   Component Value Date    TSH 2.210 10/04/2021     Lab Results   Component Value Date    CHOL 206 (H) 10/05/2022    CHOL 185 10/04/2021     Lab Results   Component Value Date    HDL 87 (H) 10/05/2022    HDL 71 (H) 10/04/2021     Lab Results   Component Value Date    LDLCALC 107 10/05/2022    LDLCALC 101 10/04/2021     Lab Results   Component Value Date    TRIG 61 10/05/2022    TRIG 65 10/04/2021     Lab Results   Component Value Date    CHOLHDL 2.4 10/05/2022    CHOLHDL 2.6 10/04/2021     Lab Results   Component Value Date    HGBA1C 5.5 04/24/2023         Assessment and Plan   Upper respiratory tract infection, unspecified type  -     amoxicillin (AMOXIL) 875 MG tablet; Take 1 tablet (875 mg total) by mouth 2 (two) times daily. for 10 days  Dispense: 20 tablet; Refill: 0  -     fluticasone propionate (FLONASE) 50 mcg/actuation nasal spray; 2 sprays (100 mcg total) by Each Nostril route once daily.  Dispense: 16 g; Refill: 1    Suspected 2019 novel coronavirus infection  -     POCT SARS-COV2 (COVID) with Flu Antigen    Gastroesophageal reflux disease, unspecified whether esophagitis present  -     esomeprazole (NEXIUM) 20 MG capsule; Take 1 capsule (20 mg total) by mouth before breakfast.  Dispense: 90 capsule; Refill: 0    Other orders  -     meloxicam (MOBIC) 7.5 MG tablet; Take 1 tablet (7.5 mg total) by mouth once daily. Take with food  Dispense: 90 tablet; Refill: 0          Patient Instructions  Patient Instructions   Rx for mobic and Nexium     Amoxicillin and Flonase as prescribed  Increase water intake   Mucinex OTC as needed     Follow up in one week if symptoms persist sooner if symptoms worsen

## 2023-08-10 NOTE — PATIENT INSTRUCTIONS
Rx for mobic and Nexium     Amoxicillin and Flonase as prescribed  Increase water intake   Mucinex OTC as needed     Follow up in one week if symptoms persist sooner if symptoms worsen

## 2023-08-24 PROBLEM — M70.61 TROCHANTERIC BURSITIS OF RIGHT HIP: Status: RESOLVED | Noted: 2022-09-12 | Resolved: 2023-08-24

## 2023-08-24 NOTE — PLAN OF CARE
"[facility]   Physical Therapy Treatment Note/Discharge Summary     Name: Francis Parr  Clinic Number: [id number]     Visit Date: 9/20/2022  Therapy Diagnosis: Right gluteus medius repair  Physician: Elton Kent MD     Physician Orders: PT Eval and Treat right hip  Medical Diagnosis from Referral:  Right gluteus medius repair  Evaluation Date: 9/12/2022  Updated Plan of Care Due : 12/14/2022  Authorization Period Expiration: 9/8/2023  Plan of Care Expiration: 11/10/2022     Visit # / Visits authorized: 18 / 20  PTA Visit #: 1/5      Time In: 10:46 am  Time Out: 11:03 am  Time In/Out (ULTRASOUND):  Total Billable Time:  15 minutes     SUBJECTIVE      Pt reports: continues to have "catching" in right hip that is very painful but improving. Worst after sitting/laying for long time and standing up afterwards. She reports the ultrasound helped last time and she wants to try it again today.  She was compliant with home exercise program.  Response to previous treatment: sore  Functional change: None     Pain: 0/10 up to 9/10 at times  Location: right groin       Prior Level of Function: Patient was enjoying care home and fell going down stairs  Current Level of Function: Patient wearing hip brace and using bilateral crutches    Precautions : Standard        OBJECTIVE      Objective Measures updated at progress report unless specified.      Treatment      Francis received the treatments listed below:  Whitney De Paz, PT, DPT    Patient received Ultrasound x 15 Minutes to Right Hip at 1MHZ x 20% Duty Factor x .5 with/cm2      MHP x 0 minutes               Patient Education and Home Exercises      Home Exercises Provided and Patient Education Provided      Education provided:   - AVS     Written Home Exercises Provided: Patient instructed to cont prior HEP. Exercises were reviewed and Francis was able to demonstrate them prior to the end of the session.  Francis demonstrated good  " "understanding of the education provided. See EMR under Patient Instructions for exercises provided during therapy sessions     ASSESSMENT      Patient continues to have "catching" in right hip that is very painful but improving. Worst after sitting/laying for long time and standing up afterwards. She reports the ultrasound helped last time and she wants to try it again today. Patient was treated by HARRIS Castro for her regular treatment today. Whitney De Paz, PT, DPT performed the Ultrasound treatment today.  Ultrasound was performed x 15 Minutes to Right Hip at 1MHZ x 20% Duty Factor x .5 with/cm2. Time was increased due the large area of the hip that was treated. Will continue with the current Plan of Care.                PMH from evaluation:   Lynda is a 71 y.o. female referred to outpatient Physical Therapy with a medical diagnosis of Trochanteric bursitis of right hip. Patient presents with right hip pain due to right endoscopic gluteus medius repair 9/9/2022. Patient wearing hip brace to limit abduction/adduction. No active hip abduction for 8 weeks and no passive hip adduction. Patient is 50% Wb'ing using bilateral crutches for 4-6 weeks. Patient's strength and RANGE OF MOTION is limited. Patient expressed good understanding of precautions.        Lnyda Is progressing towards her goals.   Pt prognosis is Good.      Pt will continue to benefit from skilled outpatient physical therapy to address the deficits listed in the problem list box on initial evaluation, provide pt/family education and to maximize pt's level of independence in the home and community environment.      Pt's spiritual, cultural and educational needs considered and pt agreeable to plan of care and goals.     Anticipated barriers to physical therapy: None     Goals:  Short Term Goals: 4 weeks   1. Independent with Home Exercise Program : Met   2. Increase Right Hip Range of Motion to Within Functional Limits : Met  3. Increase Right " Hip Strength to grossly 4/5 : Met  4. Patient will ambulate 500 feet with bilateral crutches with complaints of pain no grater than 2-3/10 : Not met currently due to recent flare up     Long Term Goals: 10 weeks   1. Patient will increase Right Hip Strength to grossly 4+/5 :Ongoing  2. Patient will ambulate 1000+ feet with no complaints of pain or weakness in Right Hip : Ongoing     PLAN      Updated Certification Period: 11/15/2022 to 12/14/2022  Recommended Treatment Plan: 2 times per week for 4 weeks: Electrical Stimulation Pre-Mod, Gait Training, Manual Therapy, Moist Heat/ Ice, Neuromuscular Re-ed, Patient Education, Therapeutic Activities, Therapeutic Exercise, and Ultrasound     Whitney Berg, PT, DPT     11/22/2022    - Patient did not return for further treatment after this visit on 11/22/2022 so therefore discharge is being completed.   REBEKA BERG, PT, MLT

## 2023-08-28 ENCOUNTER — OFFICE VISIT (OUTPATIENT)
Dept: FAMILY MEDICINE | Facility: CLINIC | Age: 73
End: 2023-08-28
Payer: MEDICARE

## 2023-08-28 VITALS
HEIGHT: 62 IN | HEART RATE: 90 BPM | RESPIRATION RATE: 14 BRPM | DIASTOLIC BLOOD PRESSURE: 68 MMHG | BODY MASS INDEX: 26.87 KG/M2 | WEIGHT: 146 LBS | SYSTOLIC BLOOD PRESSURE: 135 MMHG

## 2023-08-28 DIAGNOSIS — Z20.822 EXPOSURE TO COVID-19 VIRUS: ICD-10-CM

## 2023-08-28 DIAGNOSIS — R05.9 COUGH, UNSPECIFIED TYPE: ICD-10-CM

## 2023-08-28 DIAGNOSIS — J01.00 ACUTE MAXILLARY SINUSITIS, RECURRENCE NOT SPECIFIED: Primary | ICD-10-CM

## 2023-08-28 DIAGNOSIS — J34.89 SINUS DRAINAGE: ICD-10-CM

## 2023-08-28 PROCEDURE — 99213 PR OFFICE/OUTPT VISIT, EST, LEVL III, 20-29 MIN: ICD-10-PCS | Mod: ,,, | Performed by: NURSE PRACTITIONER

## 2023-08-28 PROCEDURE — 99213 OFFICE O/P EST LOW 20 MIN: CPT | Mod: ,,, | Performed by: NURSE PRACTITIONER

## 2023-08-28 PROCEDURE — 87428 SARSCOV & INF VIR A&B AG IA: CPT | Mod: RHCUB | Performed by: NURSE PRACTITIONER

## 2023-08-28 RX ORDER — DOXYCYCLINE 100 MG/1
100 CAPSULE ORAL EVERY 12 HOURS
Qty: 20 CAPSULE | Refills: 0 | Status: SHIPPED | OUTPATIENT
Start: 2023-08-28 | End: 2023-09-07 | Stop reason: ALTCHOICE

## 2023-08-28 NOTE — PATIENT INSTRUCTIONS
Doxycycline, if no improvement in symptoms in a few days or if symptoms worsen, start antibiotics   Increase water/fluid intake   Continue Flonase daily

## 2023-08-28 NOTE — PROGRESS NOTES
Clinic note     Patient name: Lynda Parr is a 72 y.o. female   Chief compliant   Chief Complaint   Patient presents with    Cough    sinus congestion     Pt c/o sinus congestion and clear sinus drainage, pt states she was exposed to covid 6 days ago.       Subjective     History of present illness   In clinic for evaluation of sinus drainage and congestion, reports drainage is clear; and nonproductive cough  She states she continues to have sinus pressure and tenderness to maxillary sinus   Known exposure to COVID positive 6 days ago       Past Medical History: HTN, HLD, RLS, insomnia iron def anemia GERD, prediabetes, Vit d def, chronic low back pain      Treated on 8/10/23 with amoxicillin and Flonase for URI, Hx of FESS by Dr Larose 2011      Neurology: Dr Carmona  Pain management Dr Delgado       Social History     Tobacco Use    Smoking status: Never     Passive exposure: Never    Smokeless tobacco: Never   Substance Use Topics    Alcohol use: Yes     Alcohol/week: 4.0 standard drinks of alcohol     Types: 4 Glasses of wine per week    Drug use: Never       Review of patient's allergies indicates:   Allergen Reactions    Ace inhibitors Other (See Comments)    Ezetimibe Other (See Comments)       Past Medical History:   Diagnosis Date    Benign essential HTN     Carpal tunnel syndrome, bilateral upper limbs     Chronic low back pain     Degeneration, intervertebral disc, lumbosacral     GERD (gastroesophageal reflux disease)     Hypercholesterolemia     Insomnia     Iron deficiency anemia     Localized swelling, mass and lump, right lower limb     Lower extremity edema     Lumbar spondylosis     Prediabetes     Restless leg     Slow transit constipation     Spinal stenosis, lumbar     Vitamin D deficiency        Past Surgical History:   Procedure Laterality Date    CATARACT EXTRACTION Left 09/10/2020    CATARACT EXTRACTION Right 10/01/2020    Dr. Stephen Fox    CHOLECYSTECTOMY  1990    COSMETIC SURGERY       eyelid    EPIDURAL STEROID INJECTION INTO LUMBAR SPINE N/A 2022    Procedure: Injection-steroid-epidural-lumbar  L3-L4  CHRIS  NO SEDATION;  Surgeon: Esther Delgado MD;  Location: [facility];  Service: Pain Management;  Laterality: N/A;  STATES HAD VAC  WILL BRING CARD    EPIDURAL STEROID INJECTION INTO LUMBAR SPINE Bilateral 2023    Procedure: L3-4 CHRIS;  Surgeon: Esther Delgado MD;  Location: [facility];  Service: Pain Management;  Laterality: Bilateral;  NO ANESTHESIA    HIP ARTHROPLASTY Left 10/2015    repair of torn gluteus medius muscle via hip arthroscopy at [facility], Dr. Kent    HIP ARTHROPLASTY Right     repair of tendon at [facility]    LUMBAR DISCECTOMY  2019    SHOULDER ARTHROSCOPY W/ ROTATOR CUFF REPAIR  2005    SINUS SURGERY  2011    TUBAL LIGATION      VASCULAR SURGERY  12/15/2017    R [facility] Laser Ablation per Dr. Herman Bullard        Family History   Problem Relation Age of Onset    Hypertension Mother             Coronary artery disease Father     Diabetes Father             Heart disease Father             Pulmonary embolism Brother     No Known Problems Son          Current Outpatient Medications:     aspirin 81 MG Chew, Take 81 mg by mouth once daily., Disp: , Rfl:     doxycycline (MONODOX) 100 MG capsule, Take 1 capsule (100 mg total) by mouth every 12 (twelve) hours. for 10 days, Disp: 20 capsule, Rfl: 0    esomeprazole (NEXIUM) 20 MG capsule, Take 1 capsule (20 mg total) by mouth before breakfast., Disp: 90 capsule, Rfl: 0    ferrous sulfate (FEOSOL) 325 mg (65 mg iron) Tab tablet, Take 1 tablet (325 mg total) by mouth daily with breakfast., Disp: 90 tablet, Rfl: 1    fluticasone propionate (FLONASE) 50 mcg/actuation nasal spray, 2 sprays (100 mcg total) by Each Nostril route once daily., Disp: 16 g, Rfl: 1    meloxicam (MOBIC) 7.5 MG tablet, Take 1 tablet (7.5 mg total) by mouth once daily. Take with food,  "Disp: 90 tablet, Rfl: 0    metFORMIN (GLUCOPHAGE) 500 MG tablet, Take 1 tablet (500 mg total) by mouth 3 (three) times daily., Disp: 270 tablet, Rfl: 1    multivitamin (THERAGRAN) per tablet, Take 1 tablet by mouth once daily., Disp: , Rfl:     niacin 100 MG Tab, Take 100 mg by mouth every evening., Disp: , Rfl:     pyridoxine, vitamin B6, (B-6) 100 MG Tab, Take 50 mg by mouth once daily., Disp: , Rfl:     vitamin D (VITAMIN D3) 1000 units Tab, Take 1,000 Units by mouth once daily., Disp: , Rfl:     zinc gluconate 50 mg tablet, Take 50 mg by mouth once daily., Disp: , Rfl:     Review of Systems   Constitutional:  Negative for activity change, chills, fever and unexpected weight change.   HENT:  Positive for rhinorrhea. Negative for nasal congestion, hearing loss, sore throat and trouble swallowing.    Eyes:  Negative for discharge and visual disturbance.   Respiratory:  Positive for cough. Negative for chest tightness, shortness of breath and wheezing.    Cardiovascular:  Negative for chest pain and palpitations.   Gastrointestinal:  Negative for blood in stool, constipation, diarrhea, nausea and vomiting.   Endocrine: Negative for polydipsia and polyuria.   Genitourinary:  Negative for difficulty urinating, dysuria, hematuria and menstrual problem.   Musculoskeletal:  Negative for arthralgias, joint swelling, myalgias and neck pain.   Neurological:  Positive for headaches. Negative for weakness.   Psychiatric/Behavioral:  Negative for confusion and dysphoric mood.        Objective     /68   Pulse 90   Resp 14   Ht 5' 2" (1.575 m)   Wt 66.2 kg (146 lb)   BMI 26.70 kg/m²     Physical Exam   Constitutional: She is oriented to person, place, and time. No distress.   HENT:   Head: Atraumatic.   Nose: Mucosal edema present. Right sinus exhibits maxillary sinus tenderness. Left sinus exhibits maxillary sinus tenderness.   Mouth/Throat: Mucous membranes are moist.   Eyes: Conjunctivae are normal. "   Cardiovascular: Normal rate and regular rhythm. Pulmonary:      Effort: No respiratory distress.      Breath sounds: Normal breath sounds. No wheezing, rhonchi or rales.     Abdominal: Soft. There is no abdominal tenderness.   Musculoskeletal:      Cervical back: Neck supple.   Neurological: She is alert and oriented to person, place, and time. Gait normal.   Skin: Skin is warm and dry.   Psychiatric: Her behavior is normal. Mood normal.       Lab Results   Component Value Date    WBC 8.29 04/24/2023    HGB 11.7 (L) 04/24/2023    HCT 36.5 (L) 04/24/2023    MCV 96.1 (H) 04/24/2023     04/24/2023       CMP  Sodium   Date Value Ref Range Status   04/24/2023 139 136 - 145 mmol/L Final     Potassium   Date Value Ref Range Status   04/24/2023 4.4 3.5 - 5.1 mmol/L Final     Chloride   Date Value Ref Range Status   04/24/2023 109 (H) 98 - 107 mmol/L Final     CO2   Date Value Ref Range Status   04/24/2023 29 21 - 32 mmol/L Final     Glucose   Date Value Ref Range Status   04/24/2023 102 74 - 106 mg/dL Final     BUN   Date Value Ref Range Status   04/24/2023 21 (H) 7 - 18 mg/dL Final     Creatinine   Date Value Ref Range Status   04/24/2023 0.61 0.55 - 1.02 mg/dL Final     Calcium   Date Value Ref Range Status   04/24/2023 9.3 8.5 - 10.1 mg/dL Final     Total Protein   Date Value Ref Range Status   04/24/2023 7.2 6.4 - 8.2 g/dL Final     Albumin   Date Value Ref Range Status   04/24/2023 3.6 3.5 - 5.0 g/dL Final     Bilirubin, Total   Date Value Ref Range Status   04/24/2023 0.4 >0.0 - 1.2 mg/dL Final     Alk Phos   Date Value Ref Range Status   04/24/2023 62 55 - 142 U/L Final     AST   Date Value Ref Range Status   04/24/2023 22 15 - 37 U/L Final     ALT   Date Value Ref Range Status   04/24/2023 31 13 - 56 U/L Final     Anion Gap   Date Value Ref Range Status   04/24/2023 5 (L) 7 - 16 mmol/L Final     eGFR   Date Value Ref Range Status   05/18/2022 75 >=60 mL/min/1.73m² Final     Lab Results   Component Value  Date    TSH 2.210 10/04/2021     Lab Results   Component Value Date    CHOL 206 (H) 10/05/2022    CHOL 185 10/04/2021     Lab Results   Component Value Date    HDL 87 (H) 10/05/2022    HDL 71 (H) 10/04/2021     Lab Results   Component Value Date    LDLCALC 107 10/05/2022    LDLCALC 101 10/04/2021     Lab Results   Component Value Date    TRIG 61 10/05/2022    TRIG 65 10/04/2021     Lab Results   Component Value Date    CHOLHDL 2.4 10/05/2022    CHOLHDL 2.6 10/04/2021     Lab Results   Component Value Date    HGBA1C 5.5 04/24/2023     Component Ref Range & Units 15:31 2 wk ago 1 yr ago   SARS Coronavirus 2 Antigen Negative Negative  Negative     Rapid Influenza A Ag Negative Negative  Negative     Rapid Influenza B Ag Negative Negative  Negative      Acceptable  Yes  Yes  Yes               Specimen Collected: 08/28/23 15:31             Assessment and Plan   Acute maxillary sinusitis, recurrence not specified  -     doxycycline (MONODOX) 100 MG capsule; Take 1 capsule (100 mg total) by mouth every 12 (twelve) hours. for 10 days  Dispense: 20 capsule; Refill: 0    Exposure to COVID-19 virus  -     POCT SARS-COV2 (COVID) with Flu Antigen    Sinus drainage  -     POCT SARS-COV2 (COVID) with Flu Antigen    Cough, unspecified type  -     POCT SARS-COV2 (COVID) with Flu Antigen          Patient Instructions  Patient Instructions   Doxycycline, if no improvement in symptoms in a few days or if symptoms worsen, start antibiotics   Increase water/fluid intake   Continue Flonase daily

## 2023-09-07 ENCOUNTER — HOSPITAL ENCOUNTER (OUTPATIENT)
Dept: RADIOLOGY | Facility: HOSPITAL | Age: 73
Discharge: HOME | End: 2023-09-07
Attending: NURSE PRACTITIONER
Payer: MEDICARE

## 2023-09-07 ENCOUNTER — OFFICE VISIT (OUTPATIENT)
Dept: FAMILY MEDICINE | Facility: CLINIC | Age: 73
End: 2023-09-07
Payer: MEDICARE

## 2023-09-07 VITALS
OXYGEN SATURATION: 96 % | HEART RATE: 77 BPM | HEIGHT: 62 IN | DIASTOLIC BLOOD PRESSURE: 70 MMHG | WEIGHT: 144.38 LBS | BODY MASS INDEX: 26.57 KG/M2 | SYSTOLIC BLOOD PRESSURE: 153 MMHG

## 2023-09-07 DIAGNOSIS — R91.8 LUNG MASS: ICD-10-CM

## 2023-09-07 DIAGNOSIS — Z79.899 HIGH RISK MEDICATION USE: ICD-10-CM

## 2023-09-07 DIAGNOSIS — R73.03 PREDIABETES: ICD-10-CM

## 2023-09-07 DIAGNOSIS — R91.1 SOLITARY PULMONARY NODULE: ICD-10-CM

## 2023-09-07 DIAGNOSIS — D50.9 IRON DEFICIENCY ANEMIA, UNSPECIFIED IRON DEFICIENCY ANEMIA TYPE: ICD-10-CM

## 2023-09-07 DIAGNOSIS — R06.02 SHORTNESS OF BREATH: Primary | ICD-10-CM
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
## 2023-09-07 DIAGNOSIS — J18.9 PNEUMONIA DUE TO INFECTIOUS ORGANISM, UNSPECIFIED LATERALITY, UNSPECIFIED PART OF LUNG: ICD-10-CM

## 2023-09-07 DIAGNOSIS — I10 BENIGN ESSENTIAL HTN: ICD-10-CM

## 2023-09-07 DIAGNOSIS — R06.02 SHORTNESS OF BREATH: ICD-10-CM

## 2023-09-07 LAB
ALBUMIN SERPL BCP-MCNC: 2.8 G/DL (ref 3.5–5)
ALBUMIN/GLOB SERPL: 0.6 {RATIO}
ALP SERPL-CCNC: 79 U/L (ref 55–142)
ALT SERPL W P-5'-P-CCNC: 25 U/L (ref 13–56)
ANION GAP SERPL CALCULATED.3IONS-SCNC: 14 MMOL/L (ref 7–16)
AST SERPL W P-5'-P-CCNC: 20 U/L (ref 15–37)
BASOPHILS # BLD AUTO: 0.06 K/UL (ref 0–0.2)
BASOPHILS NFR BLD AUTO: 0.6 % (ref 0–1)
BILIRUB SERPL-MCNC: 0.3 MG/DL (ref ?–1.2)
BUN SERPL-MCNC: 13 MG/DL (ref 7–18)
BUN SERPL-MCNC: 14 MG/DL (ref 7–18)
BUN/CREAT SERPL: 19 (ref 6–20)
BUN/CREAT SERPL: 20 (ref 6–20)
CALCIUM SERPL-MCNC: 9.1 MG/DL (ref 8.5–10.1)
CHLORIDE SERPL-SCNC: 106 MMOL/L (ref 98–107)
CO2 SERPL-SCNC: 24 MMOL/L (ref 21–32)
CREAT SERPL-MCNC: 0.7 MG/DL (ref 0.55–1.02)
CREAT SERPL-MCNC: 0.7 MG/DL (ref 0.55–1.02)
DIFFERENTIAL METHOD BLD: ABNORMAL
EGFR (NO RACE VARIABLE) (RUSH/TITUS): 93 ML/MIN/1.73M2
EGFR (NO RACE VARIABLE) (RUSH/TITUS): 93 ML/MIN/1.73M2
EOSINOPHIL # BLD AUTO: 0.64 K/UL (ref 0–0.5)
EOSINOPHIL NFR BLD AUTO: 5.9 % (ref 1–4)
ERYTHROCYTE [DISTWIDTH] IN BLOOD BY AUTOMATED COUNT: 13.3 % (ref 11.5–14.5)
FERRITIN SERPL-MCNC: 134 NG/ML (ref 8–252)
FOLATE SERPL-MCNC: >20 NG/ML (ref 3.1–17.5)
GLOBULIN SER-MCNC: 4.8 G/DL (ref 2–4)
GLUCOSE SERPL-MCNC: 95 MG/DL (ref 74–106)
HCT VFR BLD AUTO: 33.8 % (ref 38–47)
HGB BLD-MCNC: 10.7 G/DL (ref 12–16)
IMM GRANULOCYTES # BLD AUTO: 0.04 K/UL (ref 0–0.04)
IMM GRANULOCYTES NFR BLD: 0.4 % (ref 0–0.4)
IRON SATN MFR SERPL: 11 % (ref 14–50)
IRON SERPL-MCNC: 25 ΜG/DL (ref 50–170)
LYMPHOCYTES # BLD AUTO: 2.4 K/UL (ref 1–4.8)
LYMPHOCYTES NFR BLD AUTO: 22.1 % (ref 27–41)
MAGNESIUM SERPL-MCNC: 1.8 MG/DL (ref 1.7–2.3)
MCH RBC QN AUTO: 30.4 PG (ref 27–31)
MCHC RBC AUTO-ENTMCNC: 31.7 G/DL (ref 32–36)
MCV RBC AUTO: 96 FL (ref 80–96)
MONOCYTES # BLD AUTO: 0.74 K/UL (ref 0–0.8)
MONOCYTES NFR BLD AUTO: 6.8 % (ref 2–6)
MPC BLD CALC-MCNC: 9.9 FL (ref 9.4–12.4)
NEUTROPHILS # BLD AUTO: 6.98 K/UL (ref 1.8–7.7)
NEUTROPHILS NFR BLD AUTO: 64.2 % (ref 53–65)
NRBC # BLD AUTO: 0 X10E3/UL
NRBC, AUTO (.00): 0 %
PLATELET # BLD AUTO: 500 K/UL (ref 150–400)
POTASSIUM SERPL-SCNC: 4.1 MMOL/L (ref 3.5–5.1)
PROT SERPL-MCNC: 7.6 G/DL (ref 6.4–8.2)
RBC # BLD AUTO: 3.52 M/UL (ref 4.2–5.4)
SODIUM SERPL-SCNC: 140 MMOL/L (ref 136–145)
TIBC SERPL-MCNC: 228 ΜG/DL (ref 250–450)
VIT B12 SERPL-MCNC: 1058 PG/ML (ref 193–986)
WBC # BLD AUTO: 10.86 K/UL (ref 4.5–11)

## 2023-09-07 PROCEDURE — 83540 ASSAY OF IRON: CPT | Mod: ,,, | Performed by: CLINICAL MEDICAL LABORATORY

## 2023-09-07 PROCEDURE — 86618 LYME DISEASE ANTIBODY: CPT | Mod: ,,, | Performed by: CLINICAL MEDICAL LABORATORY

## 2023-09-07 PROCEDURE — 36415 COLL VENOUS BLD VENIPUNCTURE: CPT

## 2023-09-07 PROCEDURE — 85025 CBC WITH DIFFERENTIAL: ICD-10-PCS | Mod: ,,, | Performed by: CLINICAL MEDICAL LABORATORY

## 2023-09-07 PROCEDURE — 85025 COMPLETE CBC W/AUTO DIFF WBC: CPT | Mod: ,,, | Performed by: CLINICAL MEDICAL LABORATORY

## 2023-09-07 PROCEDURE — 25500020 PHARM REV CODE 255: Performed by: NURSE PRACTITIONER

## 2023-09-07 PROCEDURE — 82746 ASSAY OF FOLIC ACID SERUM: CPT | Mod: ,,, | Performed by: CLINICAL MEDICAL LABORATORY

## 2023-09-07 PROCEDURE — 86618 LYME ANTIBODY W/ IMMUNOBLOT REFLEX: ICD-10-PCS | Mod: ,,, | Performed by: CLINICAL MEDICAL LABORATORY

## 2023-09-07 PROCEDURE — 83735 ASSAY OF MAGNESIUM: CPT | Mod: ,,, | Performed by: CLINICAL MEDICAL LABORATORY

## 2023-09-07 PROCEDURE — 99214 OFFICE O/P EST MOD 30 MIN: CPT | Mod: ,,, | Performed by: NURSE PRACTITIONER

## 2023-09-07 PROCEDURE — 83550 IRON AND TIBC: ICD-10-PCS | Mod: ,,, | Performed by: CLINICAL MEDICAL LABORATORY

## 2023-09-07 PROCEDURE — 86757 SPOTTED FEVER GROUP ANTIBODIES: ICD-10-PCS | Mod: 90,,, | Performed by: CLINICAL MEDICAL LABORATORY

## 2023-09-07 PROCEDURE — 71260 CT THORAX DX C+: CPT | Mod: TC

## 2023-09-07 PROCEDURE — 83540 IRON AND TIBC: ICD-10-PCS | Mod: ,,, | Performed by: CLINICAL MEDICAL LABORATORY

## 2023-09-07 PROCEDURE — 80053 COMPREHEN METABOLIC PANEL: CPT | Mod: ,,, | Performed by: CLINICAL MEDICAL LABORATORY

## 2023-09-07 PROCEDURE — 86757 RICKETTSIA ANTIBODY: CPT | Mod: 90,,, | Performed by: CLINICAL MEDICAL LABORATORY

## 2023-09-07 PROCEDURE — 80053 COMPREHENSIVE METABOLIC PANEL: ICD-10-PCS | Mod: ,,, | Performed by: CLINICAL MEDICAL LABORATORY

## 2023-09-07 PROCEDURE — 83735 MAGNESIUM: ICD-10-PCS | Mod: ,,, | Performed by: CLINICAL MEDICAL LABORATORY

## 2023-09-07 PROCEDURE — 82728 FERRITIN: ICD-10-PCS | Mod: ,,, | Performed by: CLINICAL MEDICAL LABORATORY

## 2023-09-07 PROCEDURE — 82607 VITAMIN B-12: CPT | Mod: ,,, | Performed by: CLINICAL MEDICAL LABORATORY

## 2023-09-07 PROCEDURE — 83550 IRON BINDING TEST: CPT | Mod: ,,, | Performed by: CLINICAL MEDICAL LABORATORY

## 2023-09-07 PROCEDURE — 82746 FOLATE: ICD-10-PCS | Mod: ,,, | Performed by: CLINICAL MEDICAL LABORATORY

## 2023-09-07 PROCEDURE — 82728 ASSAY OF FERRITIN: CPT | Mod: ,,, | Performed by: CLINICAL MEDICAL LABORATORY

## 2023-09-07 PROCEDURE — 99214 PR OFFICE/OUTPT VISIT, EST, LEVL IV, 30-39 MIN: ICD-10-PCS | Mod: ,,, | Performed by: NURSE PRACTITIONER

## 2023-09-07 PROCEDURE — 82607 VITAMIN B12: ICD-10-PCS | Mod: ,,, | Performed by: CLINICAL MEDICAL LABORATORY

## 2023-09-07 RX ORDER — AZITHROMYCIN 250 MG/1
TABLET, FILM COATED ORAL
Qty: 6 TABLET | Refills: 0 | Status: SHIPPED | OUTPATIENT
Start: 2023-09-07 | End: 2023-09-12

## 2023-09-07 RX ADMIN — IOPAMIDOL 100 ML: 755 INJECTION, SOLUTION INTRAVENOUS at 11:09

## 2023-09-07 NOTE — PATIENT INSTRUCTIONS
Lab obtained in clinic today, we will notify you of results and any necessary changes to plan of care   Chest xray obtained will notify of results when available   EKG obtained will notify of results when available     ED for worsening of symptoms

## 2023-09-07 NOTE — PROGRESS NOTES
"Clinic note     Patient name: Lynda Parr is a 72 y.o. female   Chief compliant   Chief Complaint   Patient presents with    Shortness of Breath     Over the last few weeks. Last night was a "bad night" states kids noticed while she was talking on phone. O2 sat when arrived to pt room 93%-95%. States she feels like it is anemia.        Subjective     History of present illness   In clinic for evaluation of shortness of breath x 2-3 weeks, worse with activity, improves with rest; states she gets short of breath with talking, going up stairs/steps and with walking; she believes symptoms are likely r/t anemia  She denies any cough or fever   She was treated for sinusitis on 8/28/2023, tested negative for COVID and influenza in clinic that day   Denies any chest pain, dizziness, palpitations  She does reports several tick bites in the last two months, will obtain lab for [place] spotted fever and lyme     Past Medical History: HTN, HLD, RLS, insomnia, iron def anemia GERD, prediabetes, Vit d def, chronic low back pain      Neurology: Dr Carmona  Pain management Dr Delgado          Shortness of Breath  This is a new problem. The current episode started in the past 7 days. The problem occurs every few minutes. The problem has been unchanged. Pertinent negatives include no abdominal pain, chest pain, claudication, coryza, ear pain, fever, headaches, hemoptysis, leg pain, leg swelling, neck pain, orthopnea, PND, rash, rhinorrhea, sore throat, sputum production, swollen glands, syncope, vomiting or wheezing. The symptoms are aggravated by any activity. The patient has no known risk factors for DVT/PE. The treatment provided no relief. Her past medical history is significant for allergies and pneumonia. There is no history of aspirin allergies, asthma, bronchiolitis, CAD, chronic lung disease, COPD, DVT, a heart failure, PE or a recent surgery.     Social History     Tobacco Use    Smoking status: Never     Passive " exposure: Never    Smokeless tobacco: Never   Substance Use Topics    Alcohol use: Yes     Alcohol/week: 4.0 standard drinks of alcohol     Types: 4 Glasses of wine per week    Drug use: Never       Review of patient's allergies indicates:   Allergen Reactions    Ace inhibitors Other (See Comments)    Ezetimibe Other (See Comments)       Past Medical History:   Diagnosis Date    Benign essential HTN     Carpal tunnel syndrome, bilateral upper limbs     Chronic low back pain     Degeneration, intervertebral disc, lumbosacral     GERD (gastroesophageal reflux disease)     Hypercholesterolemia     Insomnia     Iron deficiency anemia     Localized swelling, mass and lump, right lower limb     Lower extremity edema     Lumbar spondylosis     Prediabetes     Restless leg     Slow transit constipation     Spinal stenosis, lumbar     Vitamin D deficiency        Past Surgical History:   Procedure Laterality Date    CATARACT EXTRACTION Left 09/10/2020    CATARACT EXTRACTION Right 10/01/2020    Dr. Stephne Fox    CHOLECYSTECTOMY  1990    COSMETIC SURGERY      eyelid    EPIDURAL STEROID INJECTION INTO LUMBAR SPINE N/A 07/21/2022    Procedure: Injection-steroid-epidural-lumbar  L3-L4  CHRIS  NO SEDATION;  Surgeon: Esther Delgado MD;  Location: [facility];  Service: Pain Management;  Laterality: N/A;  STATES HAD VAC  WILL BRING CARD    EPIDURAL STEROID INJECTION INTO LUMBAR SPINE Bilateral 03/16/2023    Procedure: L3-4 CHRIS;  Surgeon: Esther Delgado MD;  Location: [facility];  Service: Pain Management;  Laterality: Bilateral;  NO ANESTHESIA    HIP ARTHROPLASTY Left 10/2015    [facility] gl[facility] medius [facility]    HIP ARTHROPLASTY Right 2023    repair of tendon at [facility]    LUMBAR DISCECTOMY  2019    SHOULDER ARTHROSCOPY W/ ROTATOR CUFF REPAIR  2005    SINUS SURGERY  02/2011    TUBAL LIGATION      VASCULAR SURGERY  12/15/2017    R [facility]  Laser Ablation per Dr. Herman Bullard        Family History   Problem Relation Age of Onset    Hypertension Mother             Coronary artery disease Father     Diabetes Father             Heart disease Father             Pulmonary embolism Brother     No Known Problems Son          Current Outpatient Medications:     esomeprazole (NEXIUM) 20 MG capsule, Take 1 capsule (20 mg total) by mouth before breakfast., Disp: 90 capsule, Rfl: 0    ferrous sulfate (FEOSOL) 325 mg (65 mg iron) Tab tablet, Take 1 tablet (325 mg total) by mouth daily with breakfast., Disp: 90 tablet, Rfl: 1    fluticasone propionate (FLONASE) 50 mcg/actuation nasal spray, 2 sprays (100 mcg total) by Each Nostril route once daily., Disp: 16 g, Rfl: 1    meloxicam (MOBIC) 7.5 MG tablet, Take 1 tablet (7.5 mg total) by mouth once daily. Take with food, Disp: 90 tablet, Rfl: 0    metFORMIN (GLUCOPHAGE) 500 MG tablet, Take 1 tablet (500 mg total) by mouth 3 (three) times daily., Disp: 270 tablet, Rfl: 1    multivitamin (THERAGRAN) per tablet, Take 1 tablet by mouth once daily., Disp: , Rfl:     niacin 100 MG Tab, Take 100 mg by mouth every evening., Disp: , Rfl:     pyridoxine, vitamin B6, (B-6) 100 MG Tab, Take 50 mg by mouth once daily., Disp: , Rfl:     vitamin D (VITAMIN D3) 1000 units Tab, Take 1,000 Units by mouth once daily., Disp: , Rfl:     zinc gluconate 50 mg tablet, Take 50 mg by mouth once daily., Disp: , Rfl:     aspirin 81 MG Chew, Take 81 mg by mouth once daily., Disp: , Rfl:     azithromycin (Z-JOSE F) 250 MG tablet, Take 2 tablets by mouth on day 1; Take 1 tablet by mouth on days 2-5, Disp: 6 tablet, Rfl: 0  No current facility-administered medications for this visit.    Review of Systems   Constitutional:  Negative for appetite change, chills, fatigue, fever and unexpected weight change.   HENT:  Negative for ear pain, rhinorrhea and sore throat.    Respiratory:  Positive for shortness of breath. Negative for  "cough, hemoptysis, sputum production and wheezing.    Cardiovascular:  Negative for chest pain, palpitations, orthopnea, claudication, leg swelling, syncope and PND.   Gastrointestinal:  Negative for abdominal pain, change in bowel habit, constipation, diarrhea, nausea, vomiting and change in bowel habit.   Genitourinary:  Negative for dysuria and frequency.   Musculoskeletal:  Negative for arthralgias, gait problem, leg pain, myalgias and neck pain.   Integumentary:  Negative for rash.   Neurological:  Negative for dizziness, syncope, light-headedness and headaches.   Psychiatric/Behavioral:  Negative for dysphoric mood and sleep disturbance. The patient is not nervous/anxious.        Objective     BP (!) 153/70 (BP Location: Right arm, Patient Position: Sitting)   Pulse 77   Ht 5' 2" (1.575 m)   Wt 65.5 kg (144 lb 6.4 oz)   SpO2 96%   BMI 26.41 kg/m²     Physical Exam   Constitutional: She is oriented to person, place, and time. No distress.   HENT:   Head: Atraumatic.   Mouth/Throat: Mucous membranes are moist.   Cardiovascular: Normal rate and regular rhythm. Pulmonary:      Effort: Pulmonary effort is normal. No respiratory distress.      Breath sounds: Wheezing present. No rhonchi or rales.      Comments: Expiratory wheeze worse on right side     Abdominal: Soft. Bowel sounds are normal. She exhibits no distension. There is no abdominal tenderness.   Musculoskeletal:         General: Normal range of motion.      Cervical back: Neck supple.      Right lower leg: No edema.      Left lower leg: No edema.   Neurological: She is alert and oriented to person, place, and time. Gait normal.   Skin: Skin is warm and dry.   Psychiatric: Her behavior is normal. Mood normal.       Lab Results   Component Value Date    WBC 8.29 04/24/2023    HGB 11.7 (L) 04/24/2023    HCT 36.5 (L) 04/24/2023    MCV 96.1 (H) 04/24/2023     04/24/2023       CMP  Sodium   Date Value Ref Range Status   04/24/2023 139 136 - 145 " mmol/L Final     Potassium   Date Value Ref Range Status   04/24/2023 4.4 3.5 - 5.1 mmol/L Final     Chloride   Date Value Ref Range Status   04/24/2023 109 (H) 98 - 107 mmol/L Final     CO2   Date Value Ref Range Status   04/24/2023 29 21 - 32 mmol/L Final     Glucose   Date Value Ref Range Status   04/24/2023 102 74 - 106 mg/dL Final     BUN   Date Value Ref Range Status   09/07/2023 13 7 - 18 mg/dL Final     Creatinine   Date Value Ref Range Status   09/07/2023 0.70 0.55 - 1.02 mg/dL Final     Calcium   Date Value Ref Range Status   04/24/2023 9.3 8.5 - 10.1 mg/dL Final     Total Protein   Date Value Ref Range Status   04/24/2023 7.2 6.4 - 8.2 g/dL Final     Albumin   Date Value Ref Range Status   04/24/2023 3.6 3.5 - 5.0 g/dL Final     Bilirubin, Total   Date Value Ref Range Status   04/24/2023 0.4 >0.0 - 1.2 mg/dL Final     Alk Phos   Date Value Ref Range Status   04/24/2023 62 55 - 142 U/L Final     AST   Date Value Ref Range Status   04/24/2023 22 15 - 37 U/L Final     ALT   Date Value Ref Range Status   04/24/2023 31 13 - 56 U/L Final     Anion Gap   Date Value Ref Range Status   04/24/2023 5 (L) 7 - 16 mmol/L Final     eGFR   Date Value Ref Range Status   05/18/2022 75 >=60 mL/min/1.73m² Final     Lab Results   Component Value Date    TSH 2.210 10/04/2021     Lab Results   Component Value Date    CHOL 206 (H) 10/05/2022    CHOL 185 10/04/2021     Lab Results   Component Value Date    HDL 87 (H) 10/05/2022    HDL 71 (H) 10/04/2021     Lab Results   Component Value Date    LDLCALC 107 10/05/2022    LDLCALC 101 10/04/2021     Lab Results   Component Value Date    TRIG 61 10/05/2022    TRIG 65 10/04/2021     Lab Results   Component Value Date    CHOLHDL 2.4 10/05/2022    CHOLHDL 2.6 10/04/2021     Lab Results   Component Value Date    HGBA1C 5.5 04/24/2023         Assessment and Plan   Shortness of breath  -     CBC Auto Differential; Future; Expected date: 09/07/2023  -     Comprehensive Metabolic Panel;  Future; Expected date: 09/07/2023  -     Magnesium; Future; Expected date: 09/07/2023  -     X-Ray Chest PA And Lateral; Future; Expected date: 09/07/2023  -     Cancel: EKG 12-lead; Future  -     Iron and TIBC; Future; Expected date: 09/07/2023  -     Ferritin; Future; Expected date: 09/07/2023  -     Folate; Future; Expected date: 09/07/2023  -     Vitamin B12; Future; Expected date: 09/07/2023  -     Spotted Fever Group Antibodies; Future; Expected date: 09/07/2023  -     Lyme Antibody w/ Immunoblot Reflex; Future; Expected date: 09/07/2023  -     EKG 12-lead; Future  -     BUN & Creatinine; Future; Expected date: 09/07/2023    Lung mass  -     Cancel: CT Chest W Wo Contrast; Future; Expected date: 09/07/2023  -     CT Chest With Contrast; Future; Expected date: 09/07/2023  -     BUN & Creatinine; Future; Expected date: 09/07/2023    Benign essential HTN  -     CBC Auto Differential; Future; Expected date: 09/07/2023  -     Comprehensive Metabolic Panel; Future; Expected date: 09/07/2023  -     X-Ray Chest PA And Lateral; Future; Expected date: 09/07/2023  -     Cancel: EKG 12-lead; Future    Iron deficiency anemia, unspecified iron deficiency anemia type  -     CBC Auto Differential; Future; Expected date: 09/07/2023  -     Iron and TIBC; Future; Expected date: 09/07/2023  -     Ferritin; Future; Expected date: 09/07/2023  -     Folate; Future; Expected date: 09/07/2023  -     Vitamin B12; Future; Expected date: 09/07/2023    Prediabetes    High risk medication use  -     Folate; Future; Expected date: 09/07/2023  -     Vitamin B12; Future; Expected date: 09/07/2023    Tick bite, unspecified site, sequela  -     Spotted Fever Group Antibodies; Future; Expected date: 09/07/2023  -     Lyme Antibody w/ Immunoblot Reflex; Future; Expected date: 09/07/2023    Pneumonia due to infectious organism, unspecified laterality, unspecified part of lung  -     azithromycin (Z-JOSE F) 250 MG tablet; Take 2 tablets by mouth on  day 1; Take 1 tablet by mouth on days 2-5  Dispense: 6 tablet; Refill: 0    Solitary pulmonary nodule  -     Cancel: CT Chest W Wo Contrast; Future; Expected date: 09/07/2023  -     CT Chest With Contrast; Future; Expected date: 09/07/2023          Patient Instructions  Patient Instructions   Lab obtained in clinic today, we will notify you of results and any necessary changes to plan of care   Chest xray obtained will notify of results when available   EKG obtained will notify of results when available     ED for worsening of symptoms     After review of chest xray images by myself and Dr Perez, zithromax was sent to pharmacy. After radiology report of chest xray results was available, CT chest with contrast was ordered. CXR results was reviewed with pt in clinic      EXAMINATION:  XR CHEST PA AND LATERAL     CLINICAL HISTORY:  Essential (primary) hypertension     TECHNIQUE:  XR CHEST PA AND LATERAL     COMPARISON:  2015     FINDINGS:  No lines or tubes.     There is an opacity overlying the midthoracic spine measuring 7.4 x 6.8 cm, CT chest recommended.     Normal pleura.     Cardiac silhouette is similar to comparison exam.     No obvious acute bone findings.     Impression:     There is an opacity overlying the midthoracic spine measuring 7.4 x 6.8 cm, CT chest recommended.        Electronically signed by: Kodak Liu  Date:                                            09/07/2023  Time:                                           09:50           Exam Ended: 09/07/23 09:42             EXAMINATION:  CT CHEST WITH CONTRAST     CLINICAL HISTORY:  Solitary pulmonary noduleAbnormal xray - lung nodule, >= 1 cm;     TECHNIQUE:  Multiplanar CT of the chest with intravenous contrast.  100 cc of Isovue 370.     COMPARISON:  9/7/23     FINDINGS:  Lower neck: Normal     Lungs/airway: Multifocal patchy airspace opacities and ground-glass opacities located within the right middle lobe and the lower lobes of the lungs,  probably infectious/inflammatory in etiology.     Mediastinum: Normal     Upper abdomen: Mildly complex cyst within the left hepatic lobe measuring up to 3.1 cm.     Chest wall: Normal     Bones: Multilevel degenerative change     Impression:     No solid mass or concerning pulmonary nodules.     Multifocal patchy airspace opacities and ground-glass opacities located within the right middle lobe and the lower lobes of the lungs, probably infectious/inflammatory in etiology.        Electronically signed by: Kodak Liu  Date:                                            09/07/2023  Time:                                           12:05           Exam Ended: 09/07/23 11:37                 Pt notified of CT chest results, continue with Zithromax as prescribed and will plan to repeat cxr about one week after completion of antibiotics, instructed to RTC or ED for any worsening symptoms.

## 2023-09-11 ENCOUNTER — PATIENT MESSAGE (OUTPATIENT)
Dept: FAMILY MEDICINE | Facility: CLINIC | Age: 73
End: 2023-09-11
Payer: MEDICARE

## 2023-09-11 DIAGNOSIS — D64.9 LOW HEMOGLOBIN AND LOW HEMATOCRIT: Primary | ICD-10-CM

## 2023-09-11 DIAGNOSIS — R71.0 DECREASED HEMOGLOBIN: ICD-10-CM

## 2023-09-11 DIAGNOSIS — R71.0 HEMOGLOBIN DECREASED: ICD-10-CM

## 2023-09-12 DIAGNOSIS — Z12.11 SCREENING FOR MALIGNANT NEOPLASM OF COLON: Primary | ICD-10-CM

## 2023-09-12 LAB
RICK SF IGG TITR SER IF: NORMAL {TITER}
RICK SF IGM TITR SER IF: NORMAL {TITER}

## 2023-09-13 LAB — B BURGDOR IGG SER QL IB: NORMAL

## 2023-09-18 ENCOUNTER — PATIENT MESSAGE (OUTPATIENT)
Dept: FAMILY MEDICINE | Facility: CLINIC | Age: 73
End: 2023-09-18
Payer: MEDICARE

## 2023-09-25 ENCOUNTER — HOSPITAL ENCOUNTER (OUTPATIENT)
Dept: RADIOLOGY | Facility: HOSPITAL | Age: 73
Discharge: HOME | End: 2023-09-25
Attending: NURSE PRACTITIONER
Payer: MEDICARE

## 2023-09-25 DIAGNOSIS — J18.9 PNEUMONIA DUE TO INFECTIOUS ORGANISM, UNSPECIFIED LATERALITY, UNSPECIFIED PART OF LUNG: Primary | ICD-10-CM

## 2023-09-25 DIAGNOSIS — R06.02 SHORTNESS OF BREATH: ICD-10-CM

## 2023-09-25 DIAGNOSIS — J18.9 PNEUMONIA DUE TO INFECTIOUS ORGANISM, UNSPECIFIED LATERALITY, UNSPECIFIED PART OF LUNG: ICD-10-CM

## 2023-09-25 PROCEDURE — 71046 X-RAY EXAM CHEST 2 VIEWS: CPT | Mod: TC

## 2023-09-26 ENCOUNTER — PATIENT MESSAGE (OUTPATIENT)
Dept: FAMILY MEDICINE | Facility: CLINIC | Age: 73
End: 2023-09-26
Payer: MEDICARE

## 2023-09-26 DIAGNOSIS — J18.9 PNEUMONIA DUE TO INFECTIOUS ORGANISM, UNSPECIFIED LATERALITY, UNSPECIFIED PART OF LUNG: Primary | ICD-10-CM

## 2023-09-26 RX ORDER — PREDNISONE 20 MG/1
40 TABLET ORAL DAILY
Qty: 10 TABLET | Refills: 0 | Status: SHIPPED | OUTPATIENT
Start: 2023-09-26 | End: 2023-10-01

## 2023-09-28 ENCOUNTER — PATIENT MESSAGE (OUTPATIENT)
Dept: FAMILY MEDICINE | Facility: CLINIC | Age: 73
End: 2023-09-28
Payer: MEDICARE

## 2023-10-02 DIAGNOSIS — R13.10 DYSPHAGIA, UNSPECIFIED TYPE: Primary | ICD-10-CM

## 2023-10-02 DIAGNOSIS — K21.9 GASTROESOPHAGEAL REFLUX DISEASE, UNSPECIFIED WHETHER ESOPHAGITIS PRESENT: ICD-10-CM

## 2023-10-10 ENCOUNTER — PATIENT MESSAGE (OUTPATIENT)
Dept: PAIN MEDICINE | Facility: CLINIC | Age: 73
End: 2023-10-10
Payer: MEDICARE

## 2023-10-11 NOTE — PROGRESS NOTES
Clinic note     Patient name: Lynda Parr is a 72 y.o. female   Chief compliant   Chief Complaint   Patient presents with    Follow-up     States she is feeling better. Would like to talk about lipid meds. Has been taking Pravachol for about three weeks.        Subjective     History of present illness   In clinic for routine follow up and lab  States she has been taking pravachol for about three weeks now; so far she is not having any muscle cramping or pain   She reports cough and shortness of breath have improved, o2 sat at home 99% on room air, denies any fever, will plan to repeat chest xray on or about 10/25/2023    Past Medical History: HTN, HLD, ID anemia, GERD, prediabetes, RLS, insomnia       Neurology: Dr Carmona  Pain management Dr Delgado   Referral placed to LISA Delvalle NP on 10/2/2023          Social History     Tobacco Use    Smoking status: Never     Passive exposure: Never    Smokeless tobacco: Never   Substance Use Topics    Alcohol use: Yes     Alcohol/week: 4.0 standard drinks of alcohol     Types: 4 Glasses of wine per week    Drug use: Never       Review of patient's allergies indicates:   Allergen Reactions    Ace inhibitors Other (See Comments)    Ezetimibe Other (See Comments)       Past Medical History:   Diagnosis Date    Benign essential HTN     Carpal tunnel syndrome, bilateral upper limbs     Chronic low back pain     Degeneration, intervertebral disc, lumbosacral     GERD (gastroesophageal reflux disease)     Hypercholesterolemia     Insomnia     Iron deficiency anemia     Localized swelling, mass and lump, right lower limb     Lower extremity edema     Lumbar spondylosis     Prediabetes     Restless leg     Slow transit constipation     Spinal stenosis, lumbar     Vitamin D deficiency        Past Surgical History:   Procedure Laterality Date    CATARACT EXTRACTION Left 09/10/2020    CATARACT EXTRACTION Right 10/01/2020    Dr. Stephen Fox    CHOLECYSTECTOMY  1990    COSMETIC SURGERY       eyelid    EPIDURAL STEROID INJECTION INTO LUMBAR SPINE N/A 2022    Procedure: Injection-steroid-epidural-lumbar  L3-L4  CHRIS  NO SEDATION;  Surgeon: Esther Delgado MD;  Location: [facility];  Service: Pain Management;  Laterality: N/A;  STATES HAD VAC  WILL BRING CARD    EPIDURAL STEROID INJECTION INTO LUMBAR SPINE Bilateral 2023    Procedure: L3-4 CHRIS;  Surgeon: Esther Delgado MD;  Location: [facility];  Service: Pain Management;  Laterality: Bilateral;  NO ANESTHESIA    HIP ARTHROPLASTY Left 10/2015    repair of torn gluteus medius muscle via hip arthroscopy at [facility], Dr. Kent    HIP ARTHROPLASTY Right     repair of tendon at [facility]    LUMBAR DISCECTOMY  2019    SHOULDER ARTHROSCOPY W/ ROTATOR CUFF REPAIR  2005    SINUS SURGERY  2011    TUBAL LIGATION      VASCULAR SURGERY  12/15/2017    R [facility] Laser Ablation per Dr. Herman Bullard        Family History   Problem Relation Age of Onset    Hypertension Mother             Coronary artery disease Father     Diabetes Father             Heart disease Father             Pulmonary embolism Brother     No Known Problems Son          Current Outpatient Medications:     aspirin 81 MG Chew, Take 81 mg by mouth once daily., Disp: , Rfl:     multivitamin (THERAGRAN) per tablet, Take 1 tablet by mouth once daily., Disp: , Rfl:     niacin 100 MG Tab, Take 100 mg by mouth every evening., Disp: , Rfl:     pyridoxine, vitamin B6, (B-6) 100 MG Tab, Take 50 mg by mouth once daily., Disp: , Rfl:     vitamin D (VITAMIN D3) 1000 units Tab, Take 1,000 Units by mouth once daily., Disp: , Rfl:     zinc gluconate 50 mg tablet, Take 50 mg by mouth once daily., Disp: , Rfl:     esomeprazole (NEXIUM) 20 MG capsule, Take 1 capsule (20 mg total) by mouth before breakfast., Disp: 90 capsule, Rfl: 0    ferrous sulfate (FEOSOL) 325 mg (65 mg iron) Tab tablet, Take 1 tablet (325 mg total) by mouth daily  "with breakfast., Disp: 90 tablet, Rfl: 1    fluticasone propionate (FLONASE) 50 mcg/actuation nasal spray, 2 sprays (100 mcg total) by Each Nostril route once daily., Disp: 16 g, Rfl: 1    meloxicam (MOBIC) 7.5 MG tablet, Take 1 tablet (7.5 mg total) by mouth once daily. Take with food, Disp: 90 tablet, Rfl: 1    metFORMIN (GLUCOPHAGE) 500 MG tablet, Take 1 tablet (500 mg total) by mouth 3 (three) times daily., Disp: 270 tablet, Rfl: 1    pravastatin (PRAVACHOL) 10 MG tablet, Take 1 tablet (10 mg total) by mouth every evening., Disp: 90 tablet, Rfl: 1    Review of Systems   Constitutional:  Negative for activity change, appetite change, chills, fatigue, fever and unexpected weight change.   HENT:  Negative for hearing loss, rhinorrhea and trouble swallowing.    Eyes:  Negative for discharge and visual disturbance.   Respiratory:  Negative for cough, chest tightness, shortness of breath and wheezing.    Cardiovascular:  Negative for chest pain, palpitations and leg swelling.   Gastrointestinal:  Positive for constipation. Negative for abdominal pain, blood in stool, change in bowel habit, diarrhea, nausea and vomiting.   Endocrine: Negative for polydipsia and polyuria.   Genitourinary:  Negative for difficulty urinating, dysuria, frequency, hematuria and menstrual problem.   Musculoskeletal:  Negative for arthralgias, gait problem, joint swelling, myalgias and neck pain.   Neurological:  Negative for dizziness, syncope, weakness, light-headedness and headaches.   Psychiatric/Behavioral:  Negative for confusion, dysphoric mood and sleep disturbance. The patient is not nervous/anxious.        Objective     BP (!) 156/71   Pulse 70   Ht 5' 2" (1.575 m)   Wt 67.5 kg (148 lb 12.8 oz)   SpO2 97%   BMI 27.22 kg/m²     Physical Exam   Constitutional: She is oriented to person, place, and time. No distress.   HENT:   Head: Atraumatic.   Mouth/Throat: Mucous membranes are moist.   Cardiovascular: Normal rate and regular " rhythm. Pulmonary:      Effort: Pulmonary effort is normal. No respiratory distress.      Breath sounds: Normal breath sounds. No wheezing, rhonchi or rales.     Abdominal: Soft. Bowel sounds are normal. She exhibits no distension. There is no abdominal tenderness.   Musculoskeletal:         General: Normal range of motion.      Cervical back: Neck supple.      Right lower leg: No edema.      Left lower leg: No edema.   Neurological: She is alert and oriented to person, place, and time. Gait normal.   Skin: Skin is warm and dry.   Psychiatric: Her behavior is normal. Mood normal.       Lab Results   Component Value Date    WBC 10.86 09/07/2023    HGB 10.7 (L) 09/07/2023    HCT 33.8 (L) 09/07/2023    MCV 96.0 09/07/2023     (H) 09/07/2023       CMP  Sodium   Date Value Ref Range Status   09/07/2023 140 136 - 145 mmol/L Final     Potassium   Date Value Ref Range Status   09/07/2023 4.1 3.5 - 5.1 mmol/L Final     Chloride   Date Value Ref Range Status   09/07/2023 106 98 - 107 mmol/L Final     CO2   Date Value Ref Range Status   09/07/2023 24 21 - 32 mmol/L Final     Glucose   Date Value Ref Range Status   09/07/2023 95 74 - 106 mg/dL Final     BUN   Date Value Ref Range Status   09/07/2023 13 7 - 18 mg/dL Final     Creatinine   Date Value Ref Range Status   09/07/2023 0.70 0.55 - 1.02 mg/dL Final     Calcium   Date Value Ref Range Status   09/07/2023 9.1 8.5 - 10.1 mg/dL Final     Total Protein   Date Value Ref Range Status   09/07/2023 7.6 6.4 - 8.2 g/dL Final     Albumin   Date Value Ref Range Status   09/07/2023 2.8 (L) 3.5 - 5.0 g/dL Final     Bilirubin, Total   Date Value Ref Range Status   09/07/2023 0.3 >0.0 - 1.2 mg/dL Final     Alk Phos   Date Value Ref Range Status   09/07/2023 79 55 - 142 U/L Final     AST   Date Value Ref Range Status   09/07/2023 20 15 - 37 U/L Final     ALT   Date Value Ref Range Status   09/07/2023 25 13 - 56 U/L Final     Anion Gap   Date Value Ref Range Status   09/07/2023 14 7  - 16 mmol/L Final     eGFR   Date Value Ref Range Status   05/18/2022 75 >=60 mL/min/1.73m² Final     Lab Results   Component Value Date    TSH 2.210 10/04/2021     Lab Results   Component Value Date    CHOL 206 (H) 10/05/2022    CHOL 185 10/04/2021     Lab Results   Component Value Date    HDL 87 (H) 10/05/2022    HDL 71 (H) 10/04/2021     Lab Results   Component Value Date    LDLCALC 107 10/05/2022    LDLCALC 101 10/04/2021     Lab Results   Component Value Date    TRIG 61 10/05/2022    TRIG 65 10/04/2021     Lab Results   Component Value Date    CHOLHDL 2.4 10/05/2022    CHOLHDL 2.6 10/04/2021     Lab Results   Component Value Date    HGBA1C 5.5 04/24/2023         Assessment and Plan   Prediabetes  -     Hemoglobin A1C; Future; Expected date: 10/12/2023  -     metFORMIN (GLUCOPHAGE) 500 MG tablet; Take 1 tablet (500 mg total) by mouth 3 (three) times daily.  Dispense: 270 tablet; Refill: 1    Hypercholesteremia  -     Lipid Panel; Future; Expected date: 10/12/2023  -     pravastatin (PRAVACHOL) 10 MG tablet; Take 1 tablet (10 mg total) by mouth every evening.  Dispense: 90 tablet; Refill: 1    Benign essential HTN    Iron deficiency anemia, unspecified iron deficiency anemia type  -     ferrous sulfate (FEOSOL) 325 mg (65 mg iron) Tab tablet; Take 1 tablet (325 mg total) by mouth daily with breakfast.  Dispense: 90 tablet; Refill: 1  -     CBC Auto Differential; Future; Expected date: 10/12/2023    Statin intolerance    Gastroesophageal reflux disease, unspecified whether esophagitis present  -     esomeprazole (NEXIUM) 20 MG capsule; Take 1 capsule (20 mg total) by mouth before breakfast.  Dispense: 90 capsule; Refill: 0    Pneumonia due to infectious organism, unspecified laterality, unspecified part of lung  -     X-Ray Chest PA And Lateral; Future; Expected date: 10/25/2023    History of lumbar spinal fusion  -     meloxicam (MOBIC) 7.5 MG tablet; Take 1 tablet (7.5 mg total) by mouth once daily. Take with  food  Dispense: 90 tablet; Refill: 1    Flu vaccine need    Other orders  -     fluticasone propionate (FLONASE) 50 mcg/actuation nasal spray; 2 sprays (100 mcg total) by Each Nostril route once daily.  Dispense: 16 g; Refill: 1          Patient Instructions  Patient Instructions   Lab obtained in clinic today, we will notify you of results and any necessary changes to plan of care   Refills on routine medications   Follow up on 04/03/2024 and as needed; routine medication will be due 04/10/2024     Plan to repeat chest xray on or about 10/25/2024

## 2023-10-12 ENCOUNTER — OFFICE VISIT (OUTPATIENT)
Dept: FAMILY MEDICINE | Facility: CLINIC | Age: 73
End: 2023-10-12
Payer: MEDICARE

## 2023-10-12 VITALS
HEART RATE: 70 BPM | SYSTOLIC BLOOD PRESSURE: 155 MMHG | HEIGHT: 62 IN | BODY MASS INDEX: 27.38 KG/M2 | DIASTOLIC BLOOD PRESSURE: 73 MMHG | OXYGEN SATURATION: 97 % | WEIGHT: 148.81 LBS

## 2023-10-12 DIAGNOSIS — I10 BENIGN ESSENTIAL HTN: ICD-10-CM

## 2023-10-12 DIAGNOSIS — R73.03 PREDIABETES: ICD-10-CM

## 2023-10-12 DIAGNOSIS — K21.9 GASTROESOPHAGEAL REFLUX DISEASE, UNSPECIFIED WHETHER ESOPHAGITIS PRESENT: ICD-10-CM

## 2023-10-12 DIAGNOSIS — Z98.1 HISTORY OF LUMBAR SPINAL FUSION: ICD-10-CM

## 2023-10-12 DIAGNOSIS — Z23 FLU VACCINE NEED: Primary | ICD-10-CM

## 2023-10-12 DIAGNOSIS — D50.9 IRON DEFICIENCY ANEMIA, UNSPECIFIED IRON DEFICIENCY ANEMIA TYPE: ICD-10-CM

## 2023-10-12 DIAGNOSIS — E78.00 HYPERCHOLESTEREMIA: ICD-10-CM

## 2023-10-12 DIAGNOSIS — Z78.9 STATIN INTOLERANCE: ICD-10-CM

## 2023-10-12 DIAGNOSIS — J18.9 PNEUMONIA DUE TO INFECTIOUS ORGANISM, UNSPECIFIED LATERALITY, UNSPECIFIED PART OF LUNG: ICD-10-CM

## 2023-10-12 LAB
BASOPHILS # BLD AUTO: 0.07 K/UL (ref 0–0.2)
BASOPHILS NFR BLD AUTO: 0.9 % (ref 0–1)
CHOLEST SERPL-MCNC: 250 MG/DL (ref 0–200)
CHOLEST/HDLC SERPL: 3.3 {RATIO}
DIFFERENTIAL METHOD BLD: ABNORMAL
EOSINOPHIL # BLD AUTO: 0.23 K/UL (ref 0–0.5)
EOSINOPHIL NFR BLD AUTO: 3 % (ref 1–4)
ERYTHROCYTE [DISTWIDTH] IN BLOOD BY AUTOMATED COUNT: 15.8 % (ref 11.5–14.5)
EST. AVERAGE GLUCOSE BLD GHB EST-MCNC: 100 MG/DL
HBA1C MFR BLD HPLC: 5.6 % (ref 4.5–6.6)
HCT VFR BLD AUTO: 36.2 % (ref 38–47)
HDLC SERPL-MCNC: 76 MG/DL (ref 40–60)
HGB BLD-MCNC: 11.4 G/DL (ref 12–16)
IMM GRANULOCYTES # BLD AUTO: 0.01 K/UL (ref 0–0.04)
IMM GRANULOCYTES NFR BLD: 0.1 % (ref 0–0.4)
LDLC SERPL CALC-MCNC: 157 MG/DL
LDLC/HDLC SERPL: 2.1 {RATIO}
LYMPHOCYTES # BLD AUTO: 2.24 K/UL (ref 1–4.8)
LYMPHOCYTES NFR BLD AUTO: 29.1 % (ref 27–41)
MCH RBC QN AUTO: 29.9 PG (ref 27–31)
MCHC RBC AUTO-ENTMCNC: 31.5 G/DL (ref 32–36)
MCV RBC AUTO: 95 FL (ref 80–96)
MONOCYTES # BLD AUTO: 0.56 K/UL (ref 0–0.8)
MONOCYTES NFR BLD AUTO: 7.3 % (ref 2–6)
MPC BLD CALC-MCNC: 11.1 FL (ref 9.4–12.4)
NEUTROPHILS # BLD AUTO: 4.6 K/UL (ref 1.8–7.7)
NEUTROPHILS NFR BLD AUTO: 59.6 % (ref 53–65)
NONHDLC SERPL-MCNC: 174 MG/DL
NRBC # BLD AUTO: 0 X10E3/UL
NRBC, AUTO (.00): 0 %
PLATELET # BLD AUTO: 242 K/UL (ref 150–400)
RBC # BLD AUTO: 3.81 M/UL (ref 4.2–5.4)
TRIGL SERPL-MCNC: 83 MG/DL (ref 35–150)
VLDLC SERPL-MCNC: 17 MG/DL
WBC # BLD AUTO: 7.71 K/UL (ref 4.5–11)

## 2023-10-12 PROCEDURE — 85025 COMPLETE CBC W/AUTO DIFF WBC: CPT | Mod: ,,, | Performed by: CLINICAL MEDICAL LABORATORY

## 2023-10-12 PROCEDURE — 99214 OFFICE O/P EST MOD 30 MIN: CPT | Mod: ,,, | Performed by: NURSE PRACTITIONER

## 2023-10-12 PROCEDURE — 90694 VACC AIIV4 NO PRSRV 0.5ML IM: CPT | Mod: ,,, | Performed by: NURSE PRACTITIONER

## 2023-10-12 PROCEDURE — 80061 LIPID PANEL: CPT | Mod: ,,, | Performed by: CLINICAL MEDICAL LABORATORY

## 2023-10-12 PROCEDURE — 83036 HEMOGLOBIN A1C: ICD-10-PCS | Mod: ,,, | Performed by: CLINICAL MEDICAL LABORATORY

## 2023-10-12 PROCEDURE — 99214 PR OFFICE/OUTPT VISIT, EST, LEVL IV, 30-39 MIN: ICD-10-PCS | Mod: ,,, | Performed by: NURSE PRACTITIONER

## 2023-10-12 PROCEDURE — 90694 FLU VACCINE - QUADRIVALENT - ADJUVANTED: ICD-10-PCS | Mod: ,,, | Performed by: NURSE PRACTITIONER

## 2023-10-12 PROCEDURE — G0008 FLU VACCINE - QUADRIVALENT - ADJUVANTED: ICD-10-PCS | Mod: ,,, | Performed by: NURSE PRACTITIONER

## 2023-10-12 PROCEDURE — 83036 HEMOGLOBIN GLYCOSYLATED A1C: CPT | Mod: ,,, | Performed by: CLINICAL MEDICAL LABORATORY

## 2023-10-12 PROCEDURE — 80061 LIPID PANEL: ICD-10-PCS | Mod: ,,, | Performed by: CLINICAL MEDICAL LABORATORY

## 2023-10-12 PROCEDURE — 85025 CBC WITH DIFFERENTIAL: ICD-10-PCS | Mod: ,,, | Performed by: CLINICAL MEDICAL LABORATORY

## 2023-10-12 PROCEDURE — G0008 ADMIN INFLUENZA VIRUS VAC: HCPCS | Mod: ,,, | Performed by: NURSE PRACTITIONER

## 2023-10-12 RX ORDER — FERROUS SULFATE 325(65) MG
325 TABLET ORAL
Qty: 90 TABLET | Refills: 1 | Status: SHIPPED | OUTPATIENT
Start: 2023-10-12

## 2023-10-12 RX ORDER — FLUTICASONE PROPIONATE 50 MCG
2 SPRAY, SUSPENSION (ML) NASAL DAILY
Qty: 16 G | Refills: 1 | Status: SHIPPED | OUTPATIENT
Start: 2023-10-12

## 2023-10-12 RX ORDER — MELOXICAM 7.5 MG/1
7.5 TABLET ORAL DAILY
Qty: 90 TABLET | Refills: 1 | Status: SHIPPED | OUTPATIENT
Start: 2023-10-12

## 2023-10-12 RX ORDER — METFORMIN HYDROCHLORIDE 500 MG/1
500 TABLET ORAL 3 TIMES DAILY
Qty: 270 TABLET | Refills: 1 | Status: SHIPPED | OUTPATIENT
Start: 2023-10-12

## 2023-10-12 RX ORDER — PRAVASTATIN SODIUM 10 MG/1
10 TABLET ORAL NIGHTLY
Qty: 90 TABLET | Refills: 1 | Status: SHIPPED | OUTPATIENT
Start: 2023-10-12

## 2023-10-12 RX ORDER — HYDROGEN PEROXIDE 3 %
20 SOLUTION, NON-ORAL MISCELLANEOUS
Qty: 90 CAPSULE | Refills: 0 | Status: SHIPPED | OUTPATIENT
Start: 2023-10-12 | End: 2024-02-14 | Stop reason: SDUPTHER

## 2023-10-12 NOTE — PATIENT INSTRUCTIONS
Lab obtained in clinic today, we will notify you of results and any necessary changes to plan of care   Refills on routine medications   Follow up on 04/03/2024 and as needed; routine medication will be due 04/10/2024     Plan to repeat chest xray on or about 10/25/2024

## 2023-11-06 ENCOUNTER — OFFICE VISIT (OUTPATIENT)
Dept: PAIN MEDICINE | Facility: CLINIC | Age: 73
End: 2023-11-06
Payer: MEDICARE

## 2023-11-06 VITALS
DIASTOLIC BLOOD PRESSURE: 65 MMHG | WEIGHT: 149 LBS | SYSTOLIC BLOOD PRESSURE: 162 MMHG | RESPIRATION RATE: 18 BRPM | HEIGHT: 61 IN | HEART RATE: 72 BPM | BODY MASS INDEX: 28.13 KG/M2

## 2023-11-06 DIAGNOSIS — M96.1 POSTLAMINECTOMY SYNDROME OF LUMBAR REGION: Chronic | ICD-10-CM

## 2023-11-06 DIAGNOSIS — M54.50 LOW BACK PAIN, UNSPECIFIED BACK PAIN LATERALITY, UNSPECIFIED CHRONICITY, UNSPECIFIED WHETHER SCIATICA PRESENT: Chronic | ICD-10-CM

## 2023-11-06 DIAGNOSIS — M54.16 LUMBAR RADICULOPATHY: Primary | Chronic | ICD-10-CM

## 2023-11-06 PROCEDURE — 99212 OFFICE O/P EST SF 10 MIN: CPT | Mod: S$PBB,,, | Performed by: PAIN MEDICINE

## 2023-11-06 PROCEDURE — 99212 PR OFFICE/OUTPT VISIT, EST, LEVL II, 10-19 MIN: ICD-10-PCS | Mod: S$PBB,,, | Performed by: PAIN MEDICINE

## 2023-11-06 PROCEDURE — 99215 OFFICE O/P EST HI 40 MIN: CPT | Mod: PBBFAC | Performed by: PAIN MEDICINE

## 2023-11-06 NOTE — PATIENT INSTRUCTIONS
YOU ARE SCHEDULED ON 11/21/2023 AT 2:10 PM FOR YOUR PROCEDURE- L3-4 CHRIS WITH .    Procedure Instructions:    Nothing to eat or drink for 8 hours or after midnight including gum, candy, mints, or tobacco products.  If you are scheduled for 1:30 or later nothing to eat or drink after 5 a.m. the morning of the procedure, including gum, candy, mints, or tobacco products.  Must have a  at least 18 yrs of age to stay present at all times  No Diabetic medications the morning of procedure, check blood sugar the morning of procedure, if it is greater than 200 call the office at [phone]  If you are started on antibiotics or have been prescribed antibiotics, have a fever, or have any other type of infection call to reschedule procedure.  If you take blood pressure medications you can take it at your regular scheduled time with a small sip of WATER!  Dentures are to be removed prior to procedure or we can provide you with a denture cup to remove them prior to being taken back for procedure.   False eyelashes are to be removed before the morning of procedure.    Contacts will have to be removed prior to procedure.  No jewelry is to be worn to the procedure.     HOLD ASPIRIN AND ASPIRIN PRODUCTS  (ASPIRIN, BC POWDER ETC. ) FOR 7 DAYS  PRIOR TO PROCEDURE  HOLD NSAIDS( ibuprofen, mobic, meloxicam, advil, diclofenac, naproxen, relafen, celebrex,  methotrexate, aleve etc....)  FOR 3 DAYS   PRIOR TO PROCEDURE

## 2023-11-06 NOTE — H&P (VIEW-ONLY)
She Disclaimer: This note has been generated using voice-recognition software. There may be typographical errors that have been missed during proof-reading        Patient ID: Lynda Parr is a 73 y.o. female.      Chief Complaint: Low-back Pain      73-year-old female returns for re-evaluation of intractable lower back pain with worsening  over the past 1 month.  She was doing well following an epidural steroid injection,  March 16, 2023. She experienced 95% pain relief until this most recent recurrence.  She denies any precipitating events but has been golfing 3 times a week.  The pain involves the lower back and  bilateral buttocks.  She denies lower extremity involvement. Her hip pain significantly improved after aspiration of a seroma.  She returns today requesting an epidural steroid injection for intractable lower back pain and radiculopathy.                 Pain Assessment  Pain Assessment: 0-10  Pain Score:   8  Pain Location: Back  Pain Descriptors: Aching, Sharp  Pain Frequency: Intermittent  Pain Onset: Awakened from sleep  Clinical Progression: Gradually worsening  Aggravating Factors: Standing, Walking  Pain Intervention(s): Medication (See eMAR), Home medication      A's of Opioid Risk Assessment  Activity:Patient has difficulty performing ADL.   Analgesia: None   Adverse Effects: Patient denies constipation or sedation.  Aberrant Behavior:  reviewed with no aberrant drug seeking/taking behavior.      Patient denies any suicidal or homicidal ideations    Physical Therapy/Home Exercise: yes          Review of Systems   Constitutional: Negative.    HENT: Negative.     Eyes: Negative.    Respiratory: Negative.     Cardiovascular: Negative.    Gastrointestinal: Negative.    Endocrine: Negative.    Genitourinary: Negative.    Musculoskeletal:  Positive for arthralgias (Right hip) and gait problem.   Integumentary:  Negative.   Hematological: Negative.    Psychiatric/Behavioral: Negative.                Past Medical History:   Diagnosis Date    Benign essential HTN     Carpal tunnel syndrome, bilateral upper limbs     Chronic low back pain     Degeneration, intervertebral disc, lumbosacral     GERD (gastroesophageal reflux disease)     Hypercholesterolemia     Insomnia     Iron deficiency anemia     Localized swelling, mass and lump, right lower limb     Lower extremity edema     Lumbar spondylosis     Prediabetes     Restless leg     Slow transit constipation     Spinal stenosis, lumbar     Vitamin D deficiency      Past Surgical History:   Procedure Laterality Date    CATARACT EXTRACTION Left 09/10/2020    CATARACT EXTRACTION Right 10/01/2020    Dr. Stephen Fox    CHOLECYSTECTOMY  1990    COSMETIC SURGERY      eyelid    EPIDURAL STEROID INJECTION INTO LUMBAR SPINE N/A 07/21/2022    Procedure: Injection-steroid-epidural-lumbar  L3-L4  CHRIS  NO SEDATION;  Surgeon: Esther Delgado MD;  Location: [facility];  Service: Pain Management;  Laterality: N/A;  STATES HAD VAC  WILL BRING CARD    EPIDURAL STEROID INJECTION INTO LUMBAR SPINE Bilateral 03/16/2023    Procedure: L3-4 CHRIS;  Surgeon: Esther Delgado MD;  Location: [facility];  Service: Pain Management;  Laterality: Bilateral;  NO ANESTHESIA    HIP ARTHROPLASTY Left 10/2015    repair of torn gluteus medius muscle via hip arthroscopy at [facility], Dr. Kent    HIP ARTHROPLASTY Right 2023    repair of tendon at [facility]    LUMBAR DISCECTOMY  2019    SHOULDER ARTHROSCOPY W/ ROTATOR CUFF REPAIR  2005    SINUS SURGERY  02/2011    TUBAL LIGATION      VASCULAR SURGERY  12/15/2017    R [facility] Laser Ablation per Dr. Herman Bullard     Social History     Socioeconomic History    Marital status:     Number of children: 1   Tobacco Use    Smoking status: Never     Passive exposure: Never    Smokeless tobacco: Never   Substance and Sexual Activity    Alcohol use: Yes     Alcohol/week: 4.0 standard drinks of alcohol     Types:  4 Glasses of wine per week    Drug use: Never    Sexual activity: Yes     Partners: Male     Birth control/protection: Post-menopausal     Family History   Problem Relation Age of Onset    Hypertension Mother             Coronary artery disease Father     Diabetes Father             Heart disease Father             Pulmonary embolism Brother     No Known Problems Son      Review of patient's allergies indicates:   Allergen Reactions    Ace inhibitors Other (See Comments)    Ezetimibe Other (See Comments)     has a current medication list which includes the following prescription(s): aspirin, ferrous sulfate, fluticasone propionate, meloxicam, metformin, multivitamin, niacin, pravastatin, pyridoxine (vitamin b6), vitamin d, zinc gluconate, and esomeprazole.      Objective:  Vitals:    23 1306   BP: (!) 162/65   Pulse: 72   Resp: 18        Physical Exam  Nursing note reviewed.   Constitutional:       General: She is not in acute distress.     Appearance: Normal appearance. She is not ill-appearing, toxic-appearing or diaphoretic.   HENT:      Head: Normocephalic and atraumatic.      Nose: Nose normal.      Mouth/Throat:      Mouth: Mucous membranes are moist.   Eyes:      Extraocular Movements: Extraocular movements intact.      Pupils: Pupils are equal, round, and reactive to light.   Cardiovascular:      Rate and Rhythm: Normal rate and regular rhythm.      Heart sounds: Normal heart sounds.   Pulmonary:      Effort: Pulmonary effort is normal. No respiratory distress.      Breath sounds: Normal breath sounds. No stridor. No wheezing or rhonchi.   Abdominal:      General: Bowel sounds are normal.      Palpations: Abdomen is soft.   Musculoskeletal:         General: No swelling or deformity.      Cervical back: Normal and normal range of motion. No spasms or tenderness. No pain with movement. Normal range of motion.      Thoracic back: Normal.      Lumbar back: No spasms. Negative right  straight leg raise test and negative left straight leg raise test. No scoliosis.      Right hip: Tenderness and bony tenderness present. Decreased range of motion. Decreased strength.      Right lower leg: No edema.      Left lower leg: No edema.   Skin:     General: Skin is warm.   Neurological:      General: No focal deficit present.      Mental Status: She is alert and oriented to person, place, and time. Mental status is at baseline.      Cranial Nerves: No cranial nerve deficit.      Sensory: Sensation is intact. No sensory deficit.      Coordination: Coordination normal.      Gait: Gait normal.      Deep Tendon Reflexes: Reflexes are normal and symmetric.   Psychiatric:         Mood and Affect: Mood normal.         Behavior: Behavior normal.           Assessment:      1. Lumbar radiculopathy    2. Postlaminectomy syndrome of lumbar region    3. Low back pain, unspecified back pain laterality, unspecified chronicity, unspecified whether sciatica present          Plan:  Schedule L3-4 epidural steroid injection for lower back pain and radiculopathy       - Pt has been in pain for at least 6 weeks and has failed conservative care (e.g. Exercise, physical methods, NSAID/ and or muscle relaxants)  - Pt has no major risk factors for spinal cancer or contraindicated condition   - Radicular pain is interfering with functional activity  - Pain is associated with symptoms of nerve root irritation   - Any numbness documented is accompanied with paresthesia   - No evidence of systemic or local infection, bleeding tendency or unstable medical condition   - Epidural provided as part of a comprehensive pain management program  - All repeat injections have at least 80% pain relief and increase functional gain and physical activity, and reduction in reliance on the use of medication and or physical therapy  - Pt has significant functional limitation resulting in diminished quality of life and impaired age appropriate ADL's.   -  Diagnostic evaluation has ruled out other causes of pain  - Pt participating in an active rehabilitation program or home exercise program which has been discussed with the patient including heat ice and rest  - No more than 3 epidurals will be done in a 6 month period at the same level with at least 7 days between injections  - MAC is only offered in cases of needle phobia   - Injection done at L3-4 level  which is consistent with patient's dermatomal pain complaint      The planned medically necessary  surgical procedure is performed in a hospital outpatient department and not in an ambulatory surgical center due to:     -there is no geographically assessable ambulatory surgery center that has the  necessary equipment and fluoroscopy needed for the procedure     -there is no geographically assessable ambulatory surgical center available at which the physician has privileges     -an ASC's  specific  guideline regarding the individuals weight or health conditions that prevent the use of an ASC       report:  Reviewed and consistent with medication use as prescribed.      The total time spent for evaluation and management on 11/07/2023 including reviewing separately obtained history, performing a medically appropriate exam and evaluation, documenting clinical information in the health record, independently interpreting results and communicating them to the patient/family/caregiver, and ordering medications/tests/procedures was between 15-29 minutes.    The above plan and management options were discussed at length with patient. Patient is in agreement with the above and verbalized understanding. It will be communicated with the referring physician via electronic record, fax, or mail.

## 2023-11-06 NOTE — PROGRESS NOTES
She Disclaimer: This note has been generated using voice-recognition software. There may be typographical errors that have been missed during proof-reading        Patient ID: Lynda Parr is a 73 y.o. female.      Chief Complaint: Low-back Pain      73-year-old female returns for re-evaluation of intractable lower back pain with worsening  over the past 1 month.  She was doing well following an epidural steroid injection,  March 16, 2023. She experienced 95% pain relief until this most recent recurrence.  She denies any precipitating events but has been golfing 3 times a week.  The pain involves the lower back and  bilateral buttocks.  She denies lower extremity involvement. Her hip pain significantly improved after aspiration of a seroma.  She returns today requesting an epidural steroid injection for intractable lower back pain and radiculopathy.                 Pain Assessment  Pain Assessment: 0-10  Pain Score:   8  Pain Location: Back  Pain Descriptors: Aching, Sharp  Pain Frequency: Intermittent  Pain Onset: Awakened from sleep  Clinical Progression: Gradually worsening  Aggravating Factors: Standing, Walking  Pain Intervention(s): Medication (See eMAR), Home medication      A's of Opioid Risk Assessment  Activity:Patient has difficulty performing ADL.   Analgesia: None   Adverse Effects: Patient denies constipation or sedation.  Aberrant Behavior:  reviewed with no aberrant drug seeking/taking behavior.      Patient denies any suicidal or homicidal ideations    Physical Therapy/Home Exercise: yes          Review of Systems   Constitutional: Negative.    HENT: Negative.     Eyes: Negative.    Respiratory: Negative.     Cardiovascular: Negative.    Gastrointestinal: Negative.    Endocrine: Negative.    Genitourinary: Negative.    Musculoskeletal:  Positive for arthralgias (Right hip) and gait problem.   Integumentary:  Negative.   Hematological: Negative.    Psychiatric/Behavioral: Negative.                Past Medical History:   Diagnosis Date    Benign essential HTN     Carpal tunnel syndrome, bilateral upper limbs     Chronic low back pain     Degeneration, intervertebral disc, lumbosacral     GERD (gastroesophageal reflux disease)     Hypercholesterolemia     Insomnia     Iron deficiency anemia     Localized swelling, mass and lump, right lower limb     Lower extremity edema     Lumbar spondylosis     Prediabetes     Restless leg     Slow transit constipation     Spinal stenosis, lumbar     Vitamin D deficiency      Past Surgical History:   Procedure Laterality Date    CATARACT EXTRACTION Left 09/10/2020    CATARACT EXTRACTION Right 10/01/2020    Dr. Stephen Fox    CHOLECYSTECTOMY  1990    COSMETIC SURGERY      eyelid    EPIDURAL STEROID INJECTION INTO LUMBAR SPINE N/A 07/21/2022    Procedure: Injection-steroid-epidural-lumbar  L3-L4  CHRIS  NO SEDATION;  Surgeon: Esther Delgado MD;  Location: [facility];  Service: Pain Management;  Laterality: N/A;  STATES HAD VAC  WILL BRING CARD    EPIDURAL STEROID INJECTION INTO LUMBAR SPINE Bilateral 03/16/2023    Procedure: L3-4 CHRIS;  Surgeon: Esther Delgado MD;  Location: formerly [facility];  Service: Pain Management;  Laterality: Bilateral;  NO ANESTHESIA    HIP ARTHROPLASTY Left 10/2015    repair of torn gluteus medius muscle via hip arthroscopy at [facility], Dr. Kent    HIP ARTHROPLASTY Right 2023    repair of tendon at [facility]    LUMBAR DISCECTOMY  2019    SHOULDER ARTHROSCOPY W/ ROTATOR CUFF REPAIR  2005    SINUS SURGERY  02/2011    TUBAL LIGATION      VASCULAR SURGERY  12/15/2017    R [facility] Laser Ablation per Dr. Herman Bullard     Social History     Socioeconomic History    Marital status:     Number of children: 1   Tobacco Use    Smoking status: Never     Passive exposure: Never    Smokeless tobacco: Never   Substance and Sexual Activity    Alcohol use: Yes     Alcohol/week: 4.0 standard drinks of alcohol     Types:  4 Glasses of wine per week    Drug use: Never    Sexual activity: Yes     Partners: Male     Birth control/protection: Post-menopausal     Family History   Problem Relation Age of Onset    Hypertension Mother             Coronary artery disease Father     Diabetes Father             Heart disease Father             Pulmonary embolism Brother     No Known Problems Son      Review of patient's allergies indicates:   Allergen Reactions    Ace inhibitors Other (See Comments)    Ezetimibe Other (See Comments)     has a current medication list which includes the following prescription(s): aspirin, ferrous sulfate, fluticasone propionate, meloxicam, metformin, multivitamin, niacin, pravastatin, pyridoxine (vitamin b6), vitamin d, zinc gluconate, and esomeprazole.      Objective:  Vitals:    23 1306   BP: (!) 162/65   Pulse: 72   Resp: 18        Physical Exam  Nursing note reviewed.   Constitutional:       General: She is not in acute distress.     Appearance: Normal appearance. She is not ill-appearing, toxic-appearing or diaphoretic.   HENT:      Head: Normocephalic and atraumatic.      Nose: Nose normal.      Mouth/Throat:      Mouth: Mucous membranes are moist.   Eyes:      Extraocular Movements: Extraocular movements intact.      Pupils: Pupils are equal, round, and reactive to light.   Cardiovascular:      Rate and Rhythm: Normal rate and regular rhythm.      Heart sounds: Normal heart sounds.   Pulmonary:      Effort: Pulmonary effort is normal. No respiratory distress.      Breath sounds: Normal breath sounds. No stridor. No wheezing or rhonchi.   Abdominal:      General: Bowel sounds are normal.      Palpations: Abdomen is soft.   Musculoskeletal:         General: No swelling or deformity.      Cervical back: Normal and normal range of motion. No spasms or tenderness. No pain with movement. Normal range of motion.      Thoracic back: Normal.      Lumbar back: No spasms. Negative right  straight leg raise test and negative left straight leg raise test. No scoliosis.      Right hip: Tenderness and bony tenderness present. Decreased range of motion. Decreased strength.      Right lower leg: No edema.      Left lower leg: No edema.   Skin:     General: Skin is warm.   Neurological:      General: No focal deficit present.      Mental Status: She is alert and oriented to person, place, and time. Mental status is at baseline.      Cranial Nerves: No cranial nerve deficit.      Sensory: Sensation is intact. No sensory deficit.      Coordination: Coordination normal.      Gait: Gait normal.      Deep Tendon Reflexes: Reflexes are normal and symmetric.   Psychiatric:         Mood and Affect: Mood normal.         Behavior: Behavior normal.           Assessment:      1. Lumbar radiculopathy    2. Postlaminectomy syndrome of lumbar region    3. Low back pain, unspecified back pain laterality, unspecified chronicity, unspecified whether sciatica present          Plan:  Schedule L3-4 epidural steroid injection for lower back pain and radiculopathy       - Pt has been in pain for at least 6 weeks and has failed conservative care (e.g. Exercise, physical methods, NSAID/ and or muscle relaxants)  - Pt has no major risk factors for spinal cancer or contraindicated condition   - Radicular pain is interfering with functional activity  - Pain is associated with symptoms of nerve root irritation   - Any numbness documented is accompanied with paresthesia   - No evidence of systemic or local infection, bleeding tendency or unstable medical condition   - Epidural provided as part of a comprehensive pain management program  - All repeat injections have at least 80% pain relief and increase functional gain and physical activity, and reduction in reliance on the use of medication and or physical therapy  - Pt has significant functional limitation resulting in diminished quality of life and impaired age appropriate ADL's.   -  Diagnostic evaluation has ruled out other causes of pain  - Pt participating in an active rehabilitation program or home exercise program which has been discussed with the patient including heat ice and rest  - No more than 3 epidurals will be done in a 6 month period at the same level with at least 7 days between injections  - MAC is only offered in cases of needle phobia   - Injection done at L3-4 level  which is consistent with patient's dermatomal pain complaint      The planned medically necessary  surgical procedure is performed in a hospital outpatient department and not in an ambulatory surgical center due to:     -there is no geographically assessable ambulatory surgery center that has the  necessary equipment and fluoroscopy needed for the procedure     -there is no geographically assessable ambulatory surgical center available at which the physician has privileges     -an ASC's  specific  guideline regarding the individuals weight or health conditions that prevent the use of an ASC       report:  Reviewed and consistent with medication use as prescribed.      The total time spent for evaluation and management on 11/07/2023 including reviewing separately obtained history, performing a medically appropriate exam and evaluation, documenting clinical information in the health record, independently interpreting results and communicating them to the patient/family/caregiver, and ordering medications/tests/procedures was between 15-29 minutes.    The above plan and management options were discussed at length with patient. Patient is in agreement with the above and verbalized understanding. It will be communicated with the referring physician via electronic record, fax, or mail.

## 2023-11-21 ENCOUNTER — HOSPITAL ENCOUNTER (OUTPATIENT)
Facility: HOSPITAL | Age: 73
Discharge: HOME | End: 2023-11-21
Attending: PAIN MEDICINE | Admitting: PAIN MEDICINE
Payer: MEDICARE

## 2023-11-21 VITALS
DIASTOLIC BLOOD PRESSURE: 54 MMHG | HEIGHT: 61 IN | WEIGHT: 145 LBS | OXYGEN SATURATION: 99 % | TEMPERATURE: 99 F | HEART RATE: 78 BPM | BODY MASS INDEX: 27.38 KG/M2 | RESPIRATION RATE: 10 BRPM | SYSTOLIC BLOOD PRESSURE: 147 MMHG

## 2023-11-21 DIAGNOSIS — M54.16 LUMBAR RADICULOPATHY: ICD-10-CM

## 2023-11-21 PROCEDURE — 62323 NJX INTERLAMINAR LMBR/SAC: CPT | Mod: ,,, | Performed by: PAIN MEDICINE

## 2023-11-21 PROCEDURE — 25500020 PHARM REV CODE 255: Performed by: PAIN MEDICINE

## 2023-11-21 PROCEDURE — 63600175 PHARM REV CODE 636 W HCPCS: Performed by: PAIN MEDICINE

## 2023-11-21 PROCEDURE — 62323 NJX INTERLAMINAR LMBR/SAC: CPT | Performed by: PAIN MEDICINE

## 2023-11-21 PROCEDURE — 62323 PR INJ LUMBAR/SACRAL, W/IMAGING GUIDANCE: ICD-10-PCS | Mod: ,,, | Performed by: PAIN MEDICINE

## 2023-11-21 RX ORDER — IOPAMIDOL 612 MG/ML
INJECTION, SOLUTION INTRATHECAL CODE/TRAUMA/SEDATION MEDICATION
Status: DISCONTINUED | OUTPATIENT
Start: 2023-11-21 | End: 2023-11-21 | Stop reason: HOSPADM

## 2023-11-21 RX ORDER — TRIAMCINOLONE ACETONIDE 40 MG/ML
INJECTION, SUSPENSION INTRA-ARTICULAR; INTRAMUSCULAR CODE/TRAUMA/SEDATION MEDICATION
Status: DISCONTINUED | OUTPATIENT
Start: 2023-11-21 | End: 2023-11-21 | Stop reason: HOSPADM

## 2023-11-21 RX ORDER — SODIUM CHLORIDE 9 MG/ML
INJECTION, SOLUTION INTRAVENOUS CONTINUOUS
Status: DISCONTINUED | OUTPATIENT
Start: 2023-11-21 | End: 2023-11-21 | Stop reason: HOSPADM

## 2023-11-21 NOTE — DISCHARGE SUMMARY
[facility] - Pain Management  Discharge Note  Short Stay    Procedure(s) (LRB):  L3-4 CHRIS (Bilateral)      OUTCOME: Patient tolerated treatment/procedure well without complication and is now ready for discharge.    DISPOSITION: Home or Self Care    FINAL DIAGNOSIS:  Lumbar radiculopathy    FOLLOWUP: In clinic    DISCHARGE INSTRUCTIONS:  See nurse's notes     TIME SPENT ON DISCHARGE: 5 minutes

## 2023-11-21 NOTE — OP NOTE
"Procedure Note    Procedure Date: 11/21/2023    Procedure Performed:  Lumbar interlaminar epidural steroid injection under fluoroscopy at L3-4    Indications: Patient failed conservative therapy.      Pre-op diagnosis: Lumbar Radiculopathy    Post-op diagnosis: same    Physician: Esther Delgado MD    Anesthesia: Local    Medications injected: Kenalog 40mg,  2 mL sterile preservative-free normal saline.    Local anesthetic used: 1% Lidocaine, 3 ml    Estimated Blood Loss: None    Complications:  None    Technique:  The patient was interviewed in the holding area and Risks/Benefits were discussed, including, but not limited to, the possibility of new or different pain, bleeding or infection.   All questions were answered.  The patient understood and accepted risks.  Consent was verified and signed.   A time-out was taken to identify patient and procedure prior to starting the procedure. The patient was placed in the prone position on the fluoroscopy table. The area of the lumbar spine was prepped with Chloraprep and draped in a sterile manner. The L3-4  interspace was identified and marked under AP fluoroscopy. The skin and subcutaneous tissues overlying the targeted interspace were anesthetized with 3-5 mL of 1% lidocaine using a 25G 1.5" needle.  A 20G  3.5" Tuohy epidural needle was directed toward the interspace under fluoroscopic guidance until the ligamentum flavum was engaged. From this point, a loss of resistance technique with a pulsator syringe a was used to identify entrance of the needle into the epidural space. Once loss of resistance was observed 3mL of Isovue contrast solution was injected. An appropriate epidurogram was noted.  There was good posterior epidural spread from L 2-L 4 without intravascular, intrathecal or subarachnoid spread.  There were no filling defects  or obstruction to dye flow noted.  A 3mL mixture consisting of saline and 40 mg of kenalog was injected slowly and without resistance.  " The needle was  removed and a sterile Band-Aid dressing was applied to the puncture site.  The patient tolerated the procedure well and was transferred to the .A.C.. in stable condition.  The patient was monitored after the procedure and was given post-procedure and discharge instructions to follow at home. The patient was discharged in a stable condition and accompanied by an adult .

## 2023-11-21 NOTE — PLAN OF CARE
Plan:  D/c pt at 1445  Informed pt if does not void in 8 hours to go to ER. Notify if redness, drainage, from injection site or fever over next 3-4 days. Rest and drink plenty of fluids for the remainder of the day. No lifting over 5 lbs. For the remainder of the day. Continue regular medications as prescribed. May take pain medications as prescribed.     Pain improved 100%  Pre-procedure pain:5  Post-procedure pain:0

## 2023-11-21 NOTE — BRIEF OP NOTE
The  Discharge Note  Short Stay    Admit Date: 11/21/2023    Discharge Date: 11/21/2023    Attending Physician: Esther Delgado     Discharge Provider: Esther Delgado    Diagnosis: Lumbar radiculopathy    Procedure performed: L3-4 epidural steroid injection and epiduralgram    Findings: Procedure tolerated well and without complications. Consistent with diagnosis.    EBL: 0cc    Specimens: None    Discharged Condition: Good    Final Diagnoses: Lumbar radiculopathy [M54.16]    Disposition: Home or Self Care    Hospital Course: No complications, uneventful    Outcome of Hospitalization, Treatment, Procedure, or Surgery:  Patient was admitted for outpatient interventional pain management procedure. The patient tolerated the procedure well with no complications.    Follow up/Patient Instructions:  Follow up as scheduled in Pain Management office in 3-4 weeks.  Patient has received instructions and follow up date and time.    Medications:  Continue previous medications

## 2023-11-22 ENCOUNTER — ANESTHESIA EVENT (OUTPATIENT)
Dept: GASTROENTEROLOGY | Facility: HOSPITAL | Age: 73
End: 2023-11-22
Payer: MEDICARE

## 2023-11-22 ENCOUNTER — ANESTHESIA (OUTPATIENT)
Dept: GASTROENTEROLOGY | Facility: HOSPITAL | Age: 73
End: 2023-11-22
Payer: MEDICARE

## 2023-11-22 ENCOUNTER — HOSPITAL ENCOUNTER (OUTPATIENT)
Dept: GASTROENTEROLOGY | Facility: HOSPITAL | Age: 73
Discharge: HOME | End: 2023-11-22
Attending: NURSE PRACTITIONER
Payer: MEDICARE

## 2023-11-22 VITALS
TEMPERATURE: 98 F | SYSTOLIC BLOOD PRESSURE: 154 MMHG | DIASTOLIC BLOOD PRESSURE: 58 MMHG | OXYGEN SATURATION: 100 % | HEART RATE: 83 BPM | RESPIRATION RATE: 16 BRPM

## 2023-11-22 DIAGNOSIS — Z12.11 SCREENING FOR MALIGNANT NEOPLASM OF COLON: ICD-10-CM

## 2023-11-22 DIAGNOSIS — Z86.010 HISTORY OF COLON POLYPS: Primary | ICD-10-CM

## 2023-11-22 DIAGNOSIS — K63.5 POLYP OF SIGMOID COLON, UNSPECIFIED TYPE: ICD-10-CM

## 2023-11-22 PROBLEM — Z86.0100 HISTORY OF COLON POLYPS: Status: ACTIVE | Noted: 2023-11-22

## 2023-11-22 LAB — GLUCOSE SERPL-MCNC: 134 MG/DL (ref 70–105)

## 2023-11-22 PROCEDURE — 82962 GLUCOSE BLOOD TEST: CPT

## 2023-11-22 PROCEDURE — 45380 PR COLONOSCOPY,BIOPSY: ICD-10-PCS | Mod: PT,,, | Performed by: INTERNAL MEDICINE

## 2023-11-22 PROCEDURE — 27000284 HC CANNULA NASAL: Performed by: NURSE ANESTHETIST, CERTIFIED REGISTERED

## 2023-11-22 PROCEDURE — D9220A PRA ANESTHESIA: Mod: PT,,, | Performed by: NURSE ANESTHETIST, CERTIFIED REGISTERED

## 2023-11-22 PROCEDURE — 25000003 PHARM REV CODE 250: Performed by: NURSE ANESTHETIST, CERTIFIED REGISTERED

## 2023-11-22 PROCEDURE — D9220A PRA ANESTHESIA: ICD-10-PCS | Mod: PT,,, | Performed by: NURSE ANESTHETIST, CERTIFIED REGISTERED

## 2023-11-22 PROCEDURE — 45380 COLONOSCOPY AND BIOPSY: CPT | Mod: PT,,, | Performed by: INTERNAL MEDICINE

## 2023-11-22 PROCEDURE — 88305 TISSUE EXAM BY PATHOLOGIST: CPT | Mod: TC,SUR | Performed by: INTERNAL MEDICINE

## 2023-11-22 PROCEDURE — 45380 COLONOSCOPY AND BIOPSY: CPT | Mod: PT | Performed by: INTERNAL MEDICINE

## 2023-11-22 PROCEDURE — 88305 TISSUE EXAM BY PATHOLOGIST: CPT | Mod: 26,,, | Performed by: PATHOLOGY

## 2023-11-22 PROCEDURE — 37000009 HC ANESTHESIA EA ADD 15 MINS

## 2023-11-22 PROCEDURE — 63600175 PHARM REV CODE 636 W HCPCS: Performed by: NURSE ANESTHETIST, CERTIFIED REGISTERED

## 2023-11-22 PROCEDURE — 27201423 OPTIME MED/SURG SUP & DEVICES STERILE SUPPLY

## 2023-11-22 PROCEDURE — 88305 SURGICAL PATHOLOGY: ICD-10-PCS | Mod: 26,,, | Performed by: PATHOLOGY

## 2023-11-22 PROCEDURE — 37000008 HC ANESTHESIA 1ST 15 MINUTES

## 2023-11-22 RX ORDER — SODIUM CHLORIDE 0.9 % (FLUSH) 0.9 %
10 SYRINGE (ML) INJECTION
Status: DISCONTINUED | OUTPATIENT
Start: 2023-11-22 | End: 2023-11-23 | Stop reason: HOSPADM

## 2023-11-22 RX ORDER — PROPOFOL 10 MG/ML
VIAL (ML) INTRAVENOUS CONTINUOUS PRN
Status: DISCONTINUED | OUTPATIENT
Start: 2023-11-22 | End: 2023-11-22

## 2023-11-22 RX ORDER — LIDOCAINE HYDROCHLORIDE 20 MG/ML
INJECTION INTRAVENOUS
Status: DISCONTINUED | OUTPATIENT
Start: 2023-11-22 | End: 2023-11-22

## 2023-11-22 RX ADMIN — PROPOFOL 150 MCG/KG/MIN: 10 INJECTION, EMULSION INTRAVENOUS at 03:11

## 2023-11-22 RX ADMIN — SODIUM CHLORIDE: 9 INJECTION, SOLUTION INTRAVENOUS at 03:11

## 2023-11-22 RX ADMIN — LIDOCAINE HYDROCHLORIDE 50 MG: 20 INJECTION, SOLUTION INTRAVENOUS at 03:11

## 2023-11-22 NOTE — TRANSFER OF CARE
Anesthesia Transfer of Care Note    Patient: Lynda Parr    Procedure(s) Performed: * Colonoscopy with biopsy*    Patient location: GI    Anesthesia Type: general    Transport from OR: Transported from OR on room air with adequate spontaneous ventilation. Continuous ECG monitoring in transport. Continuous SpO2 monitoring in transport    Post pain: adequate analgesia    Post assessment: no apparent anesthetic complications    Post vital signs: stable    Level of consciousness: sedated and responds to stimulation    Nausea/Vomiting: no nausea/vomiting    Complications: none    Transfer of care protocol was followedComments: Good SV continue, NAD, VSS, RTRN      Last vitals: Visit Vitals  /68   Pulse 82   Temp 36.8 °C (98.2 °F)   Resp 14   SpO2 98%   Breastfeeding No

## 2023-11-22 NOTE — ANESTHESIA PREPROCEDURE EVALUATION
11/22/2023  Lynda Parr is a 73 y.o., female.      Pre-op Assessment    I have reviewed the Patient Summary Reports.     I have reviewed the Nursing Notes. I have reviewed the NPO Status.   I have reviewed the Medications.     Review of Systems  Anesthesia Hx:  No problems with previous Anesthesia                Cardiovascular:     Hypertension                                        Hepatic/GI:     GERD             Musculoskeletal:  Arthritis               Neurological:    Neuromuscular Disease,                                       Physical Exam  General: Well nourished, Alert, Oriented and Cooperative    Airway:  Mallampati: I   Mouth Opening: Normal  Neck ROM: Normal ROM    Dental:  Intact    Chest/Lungs:  Normal Respiratory Rate    Heart:  Rate: Normal      Past Medical History:   Diagnosis Date    Benign essential HTN     Carpal tunnel syndrome, bilateral upper limbs     Chronic low back pain     Degeneration, intervertebral disc, lumbosacral     GERD (gastroesophageal reflux disease)     Hypercholesterolemia     Insomnia     Iron deficiency anemia     Localized swelling, mass and lump, right lower limb     Lower extremity edema     Lumbar spondylosis     Prediabetes     Restless leg     Slow transit constipation     Spinal stenosis, lumbar     Vitamin D deficiency        Past Surgical History:   Procedure Laterality Date    CATARACT EXTRACTION Left 09/10/2020    CATARACT EXTRACTION Right 10/01/2020    Dr. Stephen Fox    CHOLECYSTECTOMY  1990    COSMETIC SURGERY      eyelid    EPIDURAL STEROID INJECTION INTO LUMBAR SPINE N/A 07/21/2022    Procedure: Injection-steroid-epidural-lumbar  L3-L4  CHRIS  NO SEDATION;  Surgeon: Esther Delgado MD;  Location: [facility];  Service: Pain Management;  Laterality: N/A;  STATES HAD VAC  WILL BRING CARD    EPIDURAL STEROID INJECTION INTO LUMBAR SPINE  Bilateral 2023    Procedure: L3-4 CHRIS;  Surgeon: Esther Delgado MD;  Location: [facility];  Service: Pain Management;  Laterality: Bilateral;  NO ANESTHESIA    EPIDURAL STEROID INJECTION INTO LUMBAR SPINE Bilateral 2023    Procedure: L3-4 CHRIS;  Surgeon: Esther Delgado MD;  Location: [facility];  Service: Pain Management;  Laterality: Bilateral;  no sedation    HIP ARTHROPLASTY Left 10/2015    repair of torn gluteus medius muscle via hip arthroscopy at [facility], Dr. Kent    HIP ARTHROPLASTY Right     repair of tendon at [facility]    LUMBAR DISCECTOMY  2019    SHOULDER ARTHROSCOPY W/ ROTATOR CUFF REPAIR  2005    SINUS SURGERY  2011    TUBAL LIGATION      VASCULAR SURGERY  12/15/2017    R [facility]SV Laser Ablation per Dr. Herman Bullard       Family History   Problem Relation Age of Onset    Hypertension Mother             Coronary artery disease Father     Diabetes Father             Heart disease Father             Pulmonary embolism Brother     No Known Problems Son        Social History     Socioeconomic History    Marital status:     Number of children: 1   Tobacco Use    Smoking status: Never     Passive exposure: Never    Smokeless tobacco: Never   Substance and Sexual Activity    Alcohol use: Yes     Alcohol/week: 4.0 standard drinks of alcohol     Types: 4 Glasses of wine per week     Comment: per week    Drug use: Never    Sexual activity: Yes     Partners: Male     Birth control/protection: Post-menopausal       Current Outpatient Medications   Medication Sig Dispense Refill    aspirin 81 MG Chew Take 81 mg by mouth once daily.      esomeprazole (NEXIUM) 20 MG capsule Take 1 capsule (20 mg total) by mouth before breakfast. 90 capsule 0    ferrous sulfate (FEOSOL) 325 mg (65 mg iron) Tab tablet Take 1 tablet (325 mg total) by mouth daily with breakfast. 90 tablet 1    fluticasone propionate (FLONASE) 50 mcg/actuation  nasal spray 2 sprays (100 mcg total) by Each Nostril route once daily. 16 g 1    meloxicam (MOBIC) 7.5 MG tablet Take 1 tablet (7.5 mg total) by mouth once daily. Take with food 90 tablet 1    metFORMIN (GLUCOPHAGE) 500 MG tablet Take 1 tablet (500 mg total) by mouth 3 (three) times daily. 270 tablet 1    multivitamin (THERAGRAN) per tablet Take 1 tablet by mouth once daily.      niacin 100 MG Tab Take 100 mg by mouth every evening.      pravastatin (PRAVACHOL) 10 MG tablet Take 1 tablet (10 mg total) by mouth every evening. 90 tablet 1    pyridoxine, vitamin B6, (B-6) 100 MG Tab Take 50 mg by mouth once daily.      vitamin D (VITAMIN D3) 1000 units Tab Take 1,000 Units by mouth once daily.      zinc gluconate 50 mg tablet Take 50 mg by mouth once daily.       No current facility-administered medications for this encounter.       Review of patient's allergies indicates:   Allergen Reactions    Ace inhibitors Other (See Comments)    Ezetimibe Other (See Comments)     Patient Active Problem List   Diagnosis    Benign essential HTN    Prediabetes    Iron deficiency anemia    Insomnia    GERD (gastroesophageal reflux disease)    Hypercholesteremia    History of lumbar spinal fusion    Statin intolerance         Anesthesia Plan  Type of Anesthesia, risks & benefits discussed:    Anesthesia Type: Gen Natural Airway, MAC  Intra-op Monitoring Plan: Standard ASA Monitors  Post Op Pain Control Plan: multimodal analgesia and IV/PO Opioids PRN  Induction:  IV  Informed Consent: Informed consent signed with the Patient and all parties understand the risks and agree with anesthesia plan.  All questions answered. Patient consented to blood products? Yes  ASA Score: 3  Day of Surgery Review of History & Physical: I have interviewed and examined the patient. I have reviewed the patient's H&P dated: There are no significant changes.     Ready For Surgery From Anesthesia Perspective.     .

## 2023-11-22 NOTE — H&P
[facility] - Endoscopy  Gastroenterology  H&P    Patient Name: Lynda Parr  MRN: [id number]  Admission Date: 11/22/2023  Code Status: No Order    Attending Provider: Vicky Peres FNP   Primary Care Physician: Vicky Peres FNP  Principal Problem:<principal problem not specified>    Subjective:     History of Present Illness: Pt has histor yof colon polyps. Her last colonoscopy was 2020.    Past Medical History:   Diagnosis Date    Benign essential HTN     Carpal tunnel syndrome, bilateral upper limbs     Chronic low back pain     Degeneration, intervertebral disc, lumbosacral     GERD (gastroesophageal reflux disease)     Hypercholesterolemia     Insomnia     Iron deficiency anemia     Localized swelling, mass and lump, right lower limb     Lower extremity edema     Lumbar spondylosis     Prediabetes     Restless leg     Slow transit constipation     Spinal stenosis, lumbar     Vitamin D deficiency        Past Surgical History:   Procedure Laterality Date    CATARACT EXTRACTION Left 09/10/2020    CATARACT EXTRACTION Right 10/01/2020    Dr. Stephen Fox    CHOLECYSTECTOMY  1990    COSMETIC SURGERY      eyelid    EPIDURAL STEROID INJECTION INTO LUMBAR SPINE N/A 07/21/2022    Procedure: Injection-steroid-epidural-lumbar  L3-L4  CHRIS  NO SEDATION;  Surgeon: Esther Delgado MD;  Location: [facility];  Service: Pain Management;  Laterality: N/A;  STATES HAD VAC  WILL BRING CARD    EPIDURAL STEROID INJECTION INTO LUMBAR SPINE Bilateral 03/16/2023    Procedure: L3-4 CHRIS;  Surgeon: Esther Delgado MD;  Location: [facility];  Service: Pain Management;  Laterality: Bilateral;  NO ANESTHESIA    EPIDURAL STEROID INJECTION INTO LUMBAR SPINE Bilateral 11/21/2023    Procedure: L3-4 CHRIS;  Surgeon: Esther Delgado MD;  Location: [facility];  Service: Pain Management;  Laterality: Bilateral;  no sedation    HIP ARTHROPLASTY Left 10/2015    repair of torn gluteus medius muscle via hip arthroscopy at [facility], Dr. Kent    HIP ARTHROPLASTY Right 2023    repair of tendon at [facility]    LUMBAR DISCECTOMY  2019    SHOULDER ARTHROSCOPY W/ ROTATOR CUFF REPAIR  2005    SINUS SURGERY  02/2011    TUBAL LIGATION      VASCULAR SURGERY  12/15/2017    R [facility] Laser Ablation per Dr. Herman Bullard       Review of patient's allergies indicates:   Allergen Reactions    Ace inhibitors Other (See Comments)    Ezetimibe Other (See Comments)     Family History       Problem Relation (Age of Onset)    Coronary artery disease Father    Diabetes Father    Heart disease Father    Hypertension Mother    No Known Problems Son    Pulmonary embolism Brother          Tobacco Use    Smoking status: Never     Passive exposure: Never    Smokeless tobacco: Never   Substance and Sexual Activity    Alcohol use: Yes     Alcohol/week: 4.0 standard drinks of alcohol     Types: 4 Glasses of wine per week     Comment: per week    Drug use: Never    Sexual activity: Yes     Partners: Male     Birth control/protection: Post-menopausal     Review of Systems   Respiratory: Negative.     Cardiovascular: Negative.    Gastrointestinal: Negative.    Musculoskeletal:  Positive for back pain. Negative for arthralgias.     Objective:     Vital Signs (Most Recent):  Pulse: 88 (11/22/23 1404)  Resp: (!) 22 (11/22/23 1404)  BP: (!) 140/62 (11/22/23 1404)  SpO2: 100 % (11/22/23 1404) Vital Signs (24h Range):  Pulse:  [88] 88  Resp:  [22] 22  SpO2:  [100 %] 100 %  BP: (140)/(62) 140/62        There is no height or weight on file to calculate BMI.    No intake or output data in the 24 hours ending 11/22/23 1539    Lines/Drains/Airways       Peripheral Intravenous Line  Duration                  Peripheral IV - Single Lumen 11/22/23 1403 22 G Anterior;Right Forearm <1 day                    Physical Exam  Vitals reviewed.   Constitutional:       General: She is not in acute distress.     Appearance: Normal appearance. She is well-developed. She is not  "ill-appearing.   HENT:      Head: Normocephalic and atraumatic.      Nose: Nose normal.   Eyes:      Pupils: Pupils are equal, round, and reactive to light.   Cardiovascular:      Rate and Rhythm: Normal rate and regular rhythm.   Pulmonary:      Effort: Pulmonary effort is normal.      Breath sounds: Normal breath sounds. No wheezing.   Abdominal:      General: Abdomen is flat. Bowel sounds are normal. There is no distension.      Palpations: Abdomen is soft.      Tenderness: There is no abdominal tenderness. There is no guarding.   Skin:     General: Skin is warm and dry.      Coloration: Skin is not jaundiced.   Neurological:      Mental Status: She is alert.   Psychiatric:         Attention and Perception: Attention normal.         Mood and Affect: Affect normal.         Speech: Speech normal.         Behavior: Behavior is cooperative.      Comments: Pt was calm while speaking.         Significant Labs:  CBC: No results for input(s): "WBC", "HGB", "HCT", "PLT" in the last 48 hours.  CMP: No results for input(s): "GLU", "CALCIUM", "ALBUMIN", "PROT", "NA", "K", "CO2", "CL", "BUN", "CREATININE", "ALKPHOS", "ALT", "AST", "BILITOT" in the last 48 hours.    Significant Imaging:  Imaging results within the past 24 hours have been reviewed.    Assessment/Plan:     There are no hospital problems to display for this patient.        Imp: history of colon polyps  Plan: colonoscopy    Placido Koch MD  Gastroenterology  [facility] - Endoscopy  "

## 2023-11-22 NOTE — DISCHARGE INSTRUCTIONS
Procedure Date  11/22/23     Impression  Overall Impression:   Polyp in the sigmoid colon was removed with cold forceps biopsy  One diminutive polyp was removed with biopsy from the sigmoid colon. Pre-diverticular spasm was noted in the sigmoid colon.     Recommendation    Await pathology results     Repeat colonoscopy in 5 years      Discharge: disp; DC to home. Resume diet. No driving x 24 hours. F/U with PCP as scheduled.  Dx: history of colon polyps; one polyp was removed during this exam.    No driving today, no operating heavy machinery, no signing any legal documents until tomorrow.    Drink lots of fluids, resume regular diet.  Take your normal medications.

## 2023-11-24 NOTE — ANESTHESIA POSTPROCEDURE EVALUATION
Anesthesia Post Evaluation    Patient: Lynda Parr    Procedure(s) Performed: * Colonoscopy with biopsy     Final Anesthesia Type: general      Patient location during evaluation: GI PACU  Patient participation: Yes- Able to Participate  Level of consciousness: awake and alert  Post-procedure vital signs: reviewed and stable  Pain management: adequate  Airway patency: patent    PONV status at discharge: No PONV  Anesthetic complications: no      Cardiovascular status: blood pressure returned to baseline and hemodynamically stable  Respiratory status: spontaneous ventilation  Hydration status: euvolemic  Follow-up not needed.  Comments: Refer to nursing notes for pain/gary score upon discharge from recovery.          Vitals Value Taken Time   /58 11/22/23 1628   Temp 36.8 °C (98.2 °F) 11/22/23 1608   Pulse 79 11/22/23 1630   Resp 13 11/22/23 1630   SpO2 100 % 11/22/23 1630   Vitals shown include unvalidated device data.      Event Time   Out of Recovery 16:41:38         Pain/Gary Score: No data recorded

## 2023-11-27 LAB
ESTROGEN SERPL-MCNC: NORMAL PG/ML
INSULIN SERPL-ACNC: NORMAL U[IU]/ML
LAB AP GROSS DESCRIPTION: NORMAL
LAB AP LABORATORY NOTES: NORMAL
T3RU NFR SERPL: NORMAL %

## 2023-11-29 ENCOUNTER — PATIENT MESSAGE (OUTPATIENT)
Dept: FAMILY MEDICINE | Facility: CLINIC | Age: 73
End: 2023-11-29
Payer: MEDICARE

## 2023-12-01 NOTE — PROGRESS NOTES
She Disclaimer: This note has been generated using voice-recognition software. There may be typographical errors that have been missed during proof-reading        Patient ID: Lynda Parr is a 73 y.o. female.      Chief Complaint: Follow-up      73-year-old female returns for re-evaluation following L3-4 epidural steroid injection November 21, 2023.  The lower back is currently pain-free but she notes some occasional right hip soreness from a previous injury.  She does not require any opiates and does not request any additional treatments or medication refills.            Pain Assessment  Pain Score: 0-No pain      A's of Opioid Risk Assessment  Activity:Patient can perform ADL.   Analgesia: None  Adverse Effects: Patient denies constipation or sedation.  Aberrant Behavior:  reviewed with no aberrant drug seeking/taking behavior.      Patient denies any suicidal or homicidal ideations    Physical Therapy/Home Exercise: no          Review of Systems   Constitutional: Negative.    HENT: Negative.     Eyes: Negative.    Respiratory: Negative.     Cardiovascular: Negative.    Gastrointestinal: Negative.    Endocrine: Negative.    Genitourinary: Negative.    Musculoskeletal: Negative.    Integumentary:  Negative.   Neurological: Negative.    Hematological: Negative.    Psychiatric/Behavioral: Negative.               Past Medical History:   Diagnosis Date    Benign essential HTN     Carpal tunnel syndrome, bilateral upper limbs     Chronic low back pain     Degeneration, intervertebral disc, lumbosacral     GERD (gastroesophageal reflux disease)     Hypercholesterolemia     Insomnia     Iron deficiency anemia     Localized swelling, mass and lump, right lower limb     Lower extremity edema     Lumbar spondylosis     Prediabetes     Restless leg     Slow transit constipation     Spinal stenosis, lumbar     Vitamin D deficiency      Past Surgical History:   Procedure Laterality Date    CATARACT EXTRACTION Left 09/10/2020     CATARACT EXTRACTION Right 10/01/2020    Dr. Stephen Fox    CHOLECYSTECTOMY  1990    COSMETIC SURGERY      eyelid    EPIDURAL STEROID INJECTION INTO LUMBAR SPINE N/A 2022    Procedure: Injection-steroid-epidural-lumbar  L3-L4  CHRIS  NO SEDATION;  Surgeon: Esther Delgado MD;  Location: [facility];  Service: Pain Management;  Laterality: N/A;  STATES HAD VAC  WILL BRING CARD    EPIDURAL STEROID INJECTION INTO LUMBAR SPINE Bilateral 2023    Procedure: L3-4 CHRIS;  Surgeon: Esther Delgado MD;  Location: [facility];  Service: Pain Management;  Laterality: Bilateral;  NO ANESTHESIA    EPIDURAL STEROID INJECTION INTO LUMBAR SPINE Bilateral 2023    Procedure: L3-4 CHRIS;  Surgeon: Esther Delgado MD;  Location: [facility];  Service: Pain Management;  Laterality: Bilateral;  no sedation    HIP ARTHROPLASTY Left 10/2015    [facility]    HIP ARTHROPLASTY Right     repair of tendon at [facility]    LUMBAR DISCECTOMY  2019    SHOULDER ARTHROSCOPY W/ ROTATOR CUFF REPAIR  2005    SINUS SURGERY  2011    TUBAL LIGATION      VASCULAR SURGERY  12/15/2017    R [facility] Laser Ablation per Dr. Herman Bullard     Social History     Socioeconomic History    Marital status:     Number of children: 1   Tobacco Use    Smoking status: Never     Passive exposure: Never    Smokeless tobacco: Never   Substance and Sexual Activity    Alcohol use: Yes     Alcohol/week: 4.0 standard drinks of alcohol     Types: 4 Glasses of wine per week     Comment: per week    Drug use: Never    Sexual activity: Yes     Partners: Male     Birth control/protection: Post-menopausal     Family History   Problem Relation Age of Onset    Hypertension Mother             Coronary artery disease Father     Diabetes Father             Heart disease Father             Pulmonary embolism Brother     No  Known Problems Son      Review of patient's allergies indicates:   Allergen Reactions    Ace inhibitors Other (See Comments)     has a current medication list which includes the following prescription(s): aspirin, esomeprazole, ferrous sulfate, fluticasone propionate, meloxicam, metformin, multivitamin, pravastatin, pyridoxine (vitamin b6), vitamin d, zinc gluconate, and niacin.      Objective:  Vitals:    12/06/23 1052   BP: (!) 156/67   Pulse: 68   Resp: 18        Physical Exam  Vitals and nursing note reviewed.   Constitutional:       General: She is not in acute distress.     Appearance: Normal appearance. She is not ill-appearing, toxic-appearing or diaphoretic.   HENT:      Head: Normocephalic and atraumatic.      Nose: Nose normal.      Mouth/Throat:      Mouth: Mucous membranes are moist.   Eyes:      Extraocular Movements: Extraocular movements intact.      Pupils: Pupils are equal, round, and reactive to light.   Cardiovascular:      Rate and Rhythm: Normal rate and regular rhythm.      Heart sounds: Normal heart sounds.   Pulmonary:      Effort: Pulmonary effort is normal. No respiratory distress.      Breath sounds: Normal breath sounds. No stridor. No wheezing or rhonchi.   Abdominal:      General: Bowel sounds are normal.      Palpations: Abdomen is soft.   Musculoskeletal:         General: No swelling or deformity. Normal range of motion.      Cervical back: Normal and normal range of motion. No spasms or tenderness. No pain with movement. Normal range of motion.      Thoracic back: Normal.      Lumbar back: No spasms, tenderness or bony tenderness. Normal range of motion. Negative right straight leg raise test and negative left straight leg raise test. No scoliosis.      Right lower leg: No edema.      Left lower leg: No edema.   Skin:     General: Skin is warm.   Neurological:      General: No focal deficit present.      Mental Status: She is alert and oriented to person, place, and time. Mental  status is at baseline.      Cranial Nerves: No cranial nerve deficit.      Sensory: Sensation is intact. No sensory deficit.      Motor: No weakness.      Coordination: Coordination normal.      Gait: Gait normal.      Deep Tendon Reflexes: Reflexes are normal and symmetric.   Psychiatric:         Mood and Affect: Mood normal.         Behavior: Behavior normal.           Assessment:      1. Postlaminectomy syndrome of lumbar region    2. Lumbar radiculopathy, chronic          Plan:  1. Patient to follow up as needed for re-evaluation     report:  Reviewed and consistent with medication use as prescribed.      The total time spent for evaluation and management on 12/07/2023 including reviewing separately obtained history, performing a medically appropriate exam and evaluation, documenting clinical information in the health record, independently interpreting results and communicating them to the patient/family/caregiver, and ordering medications/tests/procedures was between 15-29 minutes.    The above plan and management options were discussed at length with patient. Patient is in agreement with the above and verbalized understanding. It will be communicated with the referring physician via electronic record, fax, or mail.

## 2023-12-06 ENCOUNTER — OFFICE VISIT (OUTPATIENT)
Dept: PAIN MEDICINE | Facility: CLINIC | Age: 73
End: 2023-12-06
Payer: MEDICARE

## 2023-12-06 VITALS
SYSTOLIC BLOOD PRESSURE: 156 MMHG | WEIGHT: 152 LBS | BODY MASS INDEX: 28.7 KG/M2 | HEART RATE: 68 BPM | RESPIRATION RATE: 18 BRPM | DIASTOLIC BLOOD PRESSURE: 67 MMHG | HEIGHT: 61 IN

## 2023-12-06 DIAGNOSIS — M54.16 LUMBAR RADICULOPATHY, CHRONIC: Chronic | ICD-10-CM

## 2023-12-06 DIAGNOSIS — M96.1 POSTLAMINECTOMY SYNDROME OF LUMBAR REGION: Primary | Chronic | ICD-10-CM

## 2023-12-06 PROCEDURE — 99214 OFFICE O/P EST MOD 30 MIN: CPT | Mod: PBBFAC | Performed by: PAIN MEDICINE

## 2023-12-06 PROCEDURE — 99212 PR OFFICE/OUTPT VISIT, EST, LEVL II, 10-19 MIN: ICD-10-PCS | Mod: S$PBB,,, | Performed by: PAIN MEDICINE

## 2023-12-06 PROCEDURE — 99212 OFFICE O/P EST SF 10 MIN: CPT | Mod: S$PBB,,, | Performed by: PAIN MEDICINE

## 2023-12-09 DIAGNOSIS — Z71.89 COMPLEX CARE COORDINATION: ICD-10-CM

## 2024-01-08 ENCOUNTER — TELEPHONE (OUTPATIENT)
Dept: OBSTETRICS AND GYNECOLOGY | Facility: CLINIC | Age: 74
End: 2024-01-08
Payer: MEDICARE

## 2024-01-08 NOTE — TELEPHONE ENCOUNTER
----- Message from Dayana Sloan sent at 1/8/2024  2:40 PM CST -----  Patient have a question a prescription that no long in stock call back [phone]      Left message for pt to return call to clinic.

## 2024-01-09 ENCOUNTER — PATIENT MESSAGE (OUTPATIENT)
Dept: OBSTETRICS AND GYNECOLOGY | Facility: CLINIC | Age: 74
End: 2024-01-09
Payer: MEDICARE

## 2024-01-10 ENCOUNTER — OFFICE VISIT (OUTPATIENT)
Dept: FAMILY MEDICINE | Facility: CLINIC | Age: 74
End: 2024-01-10
Payer: MEDICARE

## 2024-01-10 VITALS
HEIGHT: 61 IN | OXYGEN SATURATION: 99 % | BODY MASS INDEX: 29.17 KG/M2 | HEART RATE: 71 BPM | DIASTOLIC BLOOD PRESSURE: 69 MMHG | WEIGHT: 154.5 LBS | SYSTOLIC BLOOD PRESSURE: 154 MMHG

## 2024-01-10 DIAGNOSIS — I10 BENIGN ESSENTIAL HTN: Chronic | ICD-10-CM

## 2024-01-10 DIAGNOSIS — M54.16 LUMBAR RADICULOPATHY, CHRONIC: Primary | Chronic | ICD-10-CM

## 2024-01-10 DIAGNOSIS — M48.062 SPINAL STENOSIS OF LUMBAR REGION WITH NEUROGENIC CLAUDICATION: Chronic | ICD-10-CM

## 2024-01-10 PROCEDURE — 99212 OFFICE O/P EST SF 10 MIN: CPT | Mod: ,,, | Performed by: NURSE PRACTITIONER

## 2024-01-10 NOTE — PROGRESS NOTES
Clinic note     Patient name: Lynda Parr is a 73 y.o. female   Chief compliant   Chief Complaint   Patient presents with    Back Pain     States she had third back injection 11/2023. Would like to discuss repeating MRI of back. Back pain started to get worse around erinn. No fall or injuries that she is aware.        Subjective     History of present illness   In clinic for evaluation  chronic lower back pain that radiates into lower extremities worsening over the last month; she reports pain in lower back and significant fatigue in BLE with standing and walking  She has had series of three lumbar CHRIS with little improvement that only lasted about one month; last injection by Dr Delgado on 11/21/2023  She has been seen by Dr JUNI Clark, Spine clinic, last visit 9/12/2022  History of  gluteus minimus tear repaired at [facility] in [place] 09/09/2022.  Status post L4-5 TLIF October 2019.    Past Medical History: HTN, HLD, ID anemia, GERD, prediabetes, RLS, insomnia         Neurology: Dr Carmona  Pain management Dr Delgado   Referral placed to LISA Delvalle NP on 10/2/2023     I          Social History     Tobacco Use    Smoking status: Never     Passive exposure: Never    Smokeless tobacco: Never   Substance Use Topics    Alcohol use: Yes     Alcohol/week: 4.0 standard drinks of alcohol     Types: 4 Glasses of wine per week     Comment: per week    Drug use: Never       Review of patient's allergies indicates:   Allergen Reactions    Ace inhibitors Other (See Comments)       Past Medical History:   Diagnosis Date    Benign essential HTN     Carpal tunnel syndrome, bilateral upper limbs     Chronic low back pain     Degeneration, intervertebral disc, lumbosacral     GERD (gastroesophageal reflux disease)     Hypercholesterolemia     Insomnia     Iron deficiency anemia     Localized swelling, mass and lump, right lower limb     Lower extremity edema     Lumbar spondylosis     Prediabetes     Restless leg     Slow transit  constipation     Spinal stenosis, lumbar     Vitamin D deficiency        Past Surgical History:   Procedure Laterality Date    CATARACT EXTRACTION Left 09/10/2020    CATARACT EXTRACTION Right 10/01/2020    Dr. Stephen Fox    CHOLECYSTECTOMY      COSMETIC SURGERY      eyelid    EPIDURAL STEROID INJECTION INTO LUMBAR SPINE N/A 2022    Procedure: Injection-steroid-epidural-lumbar  L3-L4  CHRIS  NO SEDATION;  Surgeon: Esther Delgado MD;  Location: [facility];  Service: Pain Management;  Laterality: N/A;  STATES HAD VAC  WILL BRING CARD    EPIDURAL STEROID INJECTION INTO LUMBAR SPINE Bilateral 2023    Procedure: L3-4 CHRIS;  Surgeon: Esther Delgado MD;  Location: [facility];  Service: Pain Management;  Laterality: Bilateral;  NO ANESTHESIA    EPIDURAL STEROID INJECTION INTO LUMBAR SPINE Bilateral 2023    Procedure: L3-4 CHRIS;  Surgeon: Esther Delgado MD;  Location: [facility];  Service: Pain Management;  Laterality: Bilateral;  no sedation    HIP ARTHROPLASTY Left 10/2015    repair of torn gluteus medius muscle via hip arthroscopy at [facility] Dr. Yoli Lui    HIP ARTHROPLASTY Right     repair of tendon at [facility]    LUMBAR DISCECTOMY  2019    SHOULDER ARTHROSCOPY W/ ROTATOR CUFF REPAIR  2005    SINUS SURGERY  2011    TUBAL LIGATION      VASCULAR SURGERY  12/15/2017    R SSV Laser Ablation per Dr. Herman Bullard        Family History   Problem Relation Age of Onset    Hypertension Mother             Coronary artery disease Father     Diabetes Father             Heart disease Father             Pulmonary embolism Brother     No Known Problems Son          Current Outpatient Medications:     aspirin 81 MG Chew, Take 81 mg by mouth once daily., Disp: , Rfl:     esomeprazole (NEXIUM) 20 MG capsule, Take 1 capsule (20 mg total) by mouth before breakfast., Disp: 90 capsule, Rfl: 0    ferrous sulfate (FEOSOL) 325 mg (65 mg  "iron) Tab tablet, Take 1 tablet (325 mg total) by mouth daily with breakfast., Disp: 90 tablet, Rfl: 1    fluticasone propionate (FLONASE) 50 mcg/actuation nasal spray, 2 sprays (100 mcg total) by Each Nostril route once daily., Disp: 16 g, Rfl: 1    meloxicam (MOBIC) 7.5 MG tablet, Take 1 tablet (7.5 mg total) by mouth once daily. Take with food, Disp: 90 tablet, Rfl: 1    metFORMIN (GLUCOPHAGE) 500 MG tablet, Take 1 tablet (500 mg total) by mouth 3 (three) times daily., Disp: 270 tablet, Rfl: 1    multivitamin (THERAGRAN) per tablet, Take 1 tablet by mouth once daily., Disp: , Rfl:     pravastatin (PRAVACHOL) 10 MG tablet, Take 1 tablet (10 mg total) by mouth every evening., Disp: 90 tablet, Rfl: 1    pyridoxine, vitamin B6, (B-6) 100 MG Tab, Take 50 mg by mouth once daily., Disp: , Rfl:     vitamin D (VITAMIN D3) 1000 units Tab, Take 1,000 Units by mouth once daily., Disp: , Rfl:     zinc gluconate 50 mg tablet, Take 50 mg by mouth once daily., Disp: , Rfl:     Review of Systems   Constitutional:  Negative for appetite change, chills, fatigue, fever and unexpected weight change.   Respiratory:  Negative for cough and shortness of breath.    Cardiovascular:  Negative for chest pain, palpitations and leg swelling.   Gastrointestinal:  Negative for abdominal pain, change in bowel habit, constipation, diarrhea, nausea and vomiting.   Genitourinary:  Negative for dysuria and frequency.   Musculoskeletal:  Positive for back pain, leg pain and myalgias (fatigue in BLE). Negative for arthralgias and gait problem.   Neurological:  Negative for dizziness, syncope, light-headedness and headaches.   Psychiatric/Behavioral:  Negative for dysphoric mood and sleep disturbance. The patient is not nervous/anxious.        Objective     BP (!) 154/69   Pulse 71   Ht 5' 1" (1.549 m)   Wt 70.1 kg (154 lb 8 oz)   SpO2 99%   BMI 29.19 kg/m²     Physical Exam   Constitutional: She is oriented to person, place, and time. No " distress.   HENT:   Head: Atraumatic.   Mouth/Throat: Mucous membranes are moist.   Cardiovascular: Normal rate and regular rhythm. Pulmonary:      Effort: Pulmonary effort is normal. No respiratory distress.      Breath sounds: Normal breath sounds. No wheezing, rhonchi or rales.     Musculoskeletal:         General: Normal range of motion.      Cervical back: Neck supple.      Right lower leg: No edema.      Left lower leg: No edema.   Neurological: She is alert and oriented to person, place, and time. Gait normal.   Skin: Skin is warm and dry.   Psychiatric: Her behavior is normal. Mood normal.     EXAMINATION:  MRI LUMBAR SPINE WITHOUT CONTRAST     CLINICAL HISTORY:  Low back pain, prior surgery, new symptoms; Dorsalgia, unspecified     TECHNIQUE:  Multiplanar, multisequence MR images were acquired from the thoracolumbar junction to the sacrum without the administration of contrast.     COMPARISON:  2019     FINDINGS:  Alignment: Normal.     Vertebrae: Endplate change.  Posterior fusion hardware     Discs: Degenerative disc disease     Cord: Normal.     Degenerative findings:     L3-L4: Severe spinal canal narrowing.  Moderate to severe bilateral neural foramen narrowing.  Circumferential disc bulge, facet change and ligamentum thickening     L5-S1: Moderate bilateral neural foramen narrowing.  Circumferential disc bulge and facet change.     Paraspinal muscles & soft tissues: Unremarkable.     Impression:     As above        Electronically signed by: Kodak Liu  Date:                                            05/19/2022  Time:                                           13:50           Exam Ended: 05/19/22 13:46 CDT           EXAMINATION:  MRI HIP WITHOUT CONTRAST RIGHT     CLINICAL HISTORY:  right hip pain;Pain in right hip     TECHNIQUE:  MRI HIP WITHOUT CONTRAST RIGHT     COMPARISON:  2019     FINDINGS:  There is poor visibility of the right-sided gluteus minimus tendon, series 401, image 9.  Fluid  adjacent to the gluteus minimus muscle.     The other flexor and extensor muscles are normal.  Mild osteoarthrosis of the hips.     No labral tear.     Impression:     Findings suggesting injury to the musculotendinous junction of the right-sided gluteus minimus muscle.     Mild osteoarthrosis of the hips.     No labral tear.        Electronically signed by: Kodak Liu  Date:                                            08/07/2022  Time:                                           10:06           Exam Ended: 08/05/22 15:12 CDT Last Resulted: 08/07/22 10:06 CDT               Lab Results   Component Value Date    WBC 7.71 10/12/2023    HGB 11.4 (L) 10/12/2023    HCT 36.2 (L) 10/12/2023    MCV 95.0 10/12/2023     10/12/2023       CMP  Sodium   Date Value Ref Range Status   09/07/2023 140 136 - 145 mmol/L Final     Potassium   Date Value Ref Range Status   09/07/2023 4.1 3.5 - 5.1 mmol/L Final     Chloride   Date Value Ref Range Status   09/07/2023 106 98 - 107 mmol/L Final     CO2   Date Value Ref Range Status   09/07/2023 24 21 - 32 mmol/L Final     Glucose   Date Value Ref Range Status   09/07/2023 95 74 - 106 mg/dL Final     BUN   Date Value Ref Range Status   09/07/2023 13 7 - 18 mg/dL Final     Creatinine   Date Value Ref Range Status   09/07/2023 0.70 0.55 - 1.02 mg/dL Final     Calcium   Date Value Ref Range Status   09/07/2023 9.1 8.5 - 10.1 mg/dL Final     Total Protein   Date Value Ref Range Status   09/07/2023 7.6 6.4 - 8.2 g/dL Final     Albumin   Date Value Ref Range Status   09/07/2023 2.8 (L) 3.5 - 5.0 g/dL Final     Bilirubin, Total   Date Value Ref Range Status   09/07/2023 0.3 >0.0 - 1.2 mg/dL Final     Alk Phos   Date Value Ref Range Status   09/07/2023 79 55 - 142 U/L Final     AST   Date Value Ref Range Status   09/07/2023 20 15 - 37 U/L Final     ALT   Date Value Ref Range Status   09/07/2023 25 13 - 56 U/L Final     Anion Gap   Date Value Ref Range Status   09/07/2023 14 7 - 16 mmol/L  Final     eGFR   Date Value Ref Range Status   05/18/2022 75 >=60 mL/min/1.73m² Final     Lab Results   Component Value Date    TSH 2.210 10/04/2021     Lab Results   Component Value Date    CHOL 250 (H) 10/12/2023    CHOL 206 (H) 10/05/2022    CHOL 185 10/04/2021     Lab Results   Component Value Date    HDL 76 (H) 10/12/2023    HDL 87 (H) 10/05/2022    HDL 71 (H) 10/04/2021     Lab Results   Component Value Date    LDLCALC 157 10/12/2023    LDLCALC 107 10/05/2022    LDLCALC 101 10/04/2021     Lab Results   Component Value Date    TRIG 83 10/12/2023    TRIG 61 10/05/2022    TRIG 65 10/04/2021     Lab Results   Component Value Date    CHOLHDL 3.3 10/12/2023    CHOLHDL 2.4 10/05/2022    CHOLHDL 2.6 10/04/2021     Lab Results   Component Value Date    HGBA1C 5.6 10/12/2023         Assessment and Plan   Lumbar radiculopathy, chronic  -     MRI Lumbar Spine Without Contrast; Future; Expected date: 01/10/2024    Spinal stenosis of lumbar region with neurogenic claudication    Benign essential HTN          Patient Instructions  Patient Instructions   MRI lumbar spine without contrast ordered, will notify of results when available

## 2024-01-11 ENCOUNTER — OFFICE VISIT (OUTPATIENT)
Dept: OBSTETRICS AND GYNECOLOGY | Facility: CLINIC | Age: 74
End: 2024-01-11
Payer: MEDICARE

## 2024-01-11 VITALS
HEIGHT: 61 IN | WEIGHT: 152.81 LBS | SYSTOLIC BLOOD PRESSURE: 155 MMHG | BODY MASS INDEX: 28.85 KG/M2 | DIASTOLIC BLOOD PRESSURE: 78 MMHG | HEART RATE: 78 BPM

## 2024-01-11 DIAGNOSIS — D21.9 FIBROIDS: ICD-10-CM

## 2024-01-11 DIAGNOSIS — N89.8 VAGINAL DISCHARGE: Primary | ICD-10-CM

## 2024-01-11 LAB
CANDIDA SPECIES: NEGATIVE
GARDNERELLA: POSITIVE
TRICHOMONAS: NEGATIVE

## 2024-01-11 PROCEDURE — 99213 OFFICE O/P EST LOW 20 MIN: CPT | Mod: S$PBB,,, | Performed by: OBSTETRICS & GYNECOLOGY

## 2024-01-11 PROCEDURE — 87510 GARDNER VAG DNA DIR PROBE: CPT | Mod: ,,, | Performed by: CLINICAL MEDICAL LABORATORY

## 2024-01-11 PROCEDURE — 87480 CANDIDA DNA DIR PROBE: CPT | Mod: ,,, | Performed by: CLINICAL MEDICAL LABORATORY

## 2024-01-11 PROCEDURE — 99214 OFFICE O/P EST MOD 30 MIN: CPT | Mod: PBBFAC | Performed by: OBSTETRICS & GYNECOLOGY

## 2024-01-11 PROCEDURE — 87660 TRICHOMONAS VAGIN DIR PROBE: CPT | Mod: ,,, | Performed by: CLINICAL MEDICAL LABORATORY

## 2024-01-16 ENCOUNTER — TELEPHONE (OUTPATIENT)
Dept: OBSTETRICS AND GYNECOLOGY | Facility: CLINIC | Age: 74
End: 2024-01-16
Payer: MEDICARE

## 2024-01-17 ENCOUNTER — TELEPHONE (OUTPATIENT)
Dept: OBSTETRICS AND GYNECOLOGY | Facility: CLINIC | Age: 74
End: 2024-01-17
Payer: MEDICARE

## 2024-01-17 NOTE — PROGRESS NOTES
Lynda Parr female  for   Chief Complaint   Patient presents with    Possible Infection     Pt states she's noticed a discharge that suddenly comes out; no itching/odor. Discharge is beige in color. Took an at home ph test; stated it was 6.0      OB History          1    Para   1    Term   1            AB        Living             SAB        IAB        Ectopic        Multiple        Live Births                      Past Medical History:   Diagnosis Date    Benign essential HTN     Carpal tunnel syndrome, bilateral upper limbs     Chronic low back pain     Degeneration, intervertebral disc, lumbosacral     GERD (gastroesophageal reflux disease)     Hypercholesterolemia     Insomnia     Iron deficiency anemia     Localized swelling, mass and lump, right lower limb     Lower extremity edema     Lumbar spondylosis     Prediabetes     Restless leg     Slow transit constipation     Spinal stenosis, lumbar     Vitamin D deficiency       Past Surgical History:   Procedure Laterality Date    CATARACT EXTRACTION Left 09/10/2020    CATARACT EXTRACTION Right 10/01/2020    Dr. Stephen Fxo    CHOLECYSTECTOMY      COSMETIC SURGERY      eyelid    EPIDURAL STEROID INJECTION INTO LUMBAR SPINE N/A 2022    Procedure: Injection-steroid-epidural-lumbar  L3-L4  CHRIS  NO SEDATION;  Surgeon: Esther Delgado MD;  Location: [facility];  Service: Pain Management;  Laterality: N/A;  STATES HAD VAC  WILL BRING CARD    EPIDURAL STEROID INJECTION INTO LUMBAR SPINE Bilateral 2023    Procedure: L3-4 CHRIS;  Surgeon: Esther Delgado MD;  Location: [facility];  Service: Pain Management;  Laterality: Bilateral;  NO ANESTHESIA    EPIDURAL STEROID INJECTION INTO LUMBAR SPINE Bilateral 2023    Procedure: L3-4 CHRIS;  Surgeon: Esther Delgado MD;  Location: [facility];  Service: Pain Management;  Laterality: Bilateral;  no sedation    HIP ARTHROPLASTY Left 10/2015    repair of torn gluteus  medius muscle via hip arthroscopy at [facility], Dr. Kent    HIP ARTHROPLASTY Right 2023    repair of tendon at [facility]    LUMBAR DISCECTOMY  2019    SHOULDER ARTHROSCOPY W/ ROTATOR CUFF REPAIR  2005    SINUS SURGERY  02/2011    TUBAL LIGATION      VASCULAR SURGERY  12/15/2017    R SSV Laser Ablation per Dr. Herman Bullard      Review of patient's allergies indicates:   Allergen Reactions    Ace inhibitors Other (See Comments)             Physical exam:     General Appearance: healthy    Abdomen:Normal, benign.    Pelvic: Pelvic exam: normal external genitalia, vulva, vagina, cervix, uterus and adnexa, VULVA: normal appearing vulva with no masses, tenderness or lesions, CERVIX: normal appearing cervix without discharge or lesions, UTERUS: uterus is normal size, shape, consistency and nontender, ADNEXA: normal adnexa in size, nontender and no masses, RECTAL:deferred normal rectal, no masses, guaiac negative stool obtained.     Extremity:normal    Skin: normal exam        Assessment:   Problem List Items Addressed This Visit    None  Visit Diagnoses       Vaginal discharge    -  Primary    Relevant Orders    Bacterial Vaginosis (Completed)    Endometrial Biopsy- Today    Fibroids        Relevant Orders    US Pelvis Comp with Transvag NON-OB (xpd             Plan:  The patient has been treated for BV in the past.  Other discharge, she does not have any other symptoms.  An affirm test was done today.  I am concerned that the discharge may be coming from her uterine cavity and she has been scheduled for another sonogram and an endometrial biopsy for further evaluation.

## 2024-01-18 ENCOUNTER — PATIENT MESSAGE (OUTPATIENT)
Dept: FAMILY MEDICINE | Facility: CLINIC | Age: 74
End: 2024-01-18
Payer: MEDICARE

## 2024-01-18 ENCOUNTER — HOSPITAL ENCOUNTER (OUTPATIENT)
Dept: RADIOLOGY | Facility: HOSPITAL | Age: 74
Discharge: HOME | End: 2024-01-18
Attending: NURSE PRACTITIONER
Payer: MEDICARE

## 2024-01-18 ENCOUNTER — PATIENT MESSAGE (OUTPATIENT)
Dept: OBSTETRICS AND GYNECOLOGY | Facility: CLINIC | Age: 74
End: 2024-01-18
Payer: MEDICARE

## 2024-01-18 DIAGNOSIS — M54.16 LUMBAR RADICULOPATHY, CHRONIC: Chronic | ICD-10-CM

## 2024-01-18 PROCEDURE — 72148 MRI LUMBAR SPINE W/O DYE: CPT | Mod: TC

## 2024-01-25 ENCOUNTER — HOSPITAL ENCOUNTER (OUTPATIENT)
Dept: RADIOLOGY | Facility: HOSPITAL | Age: 74
Discharge: HOME | End: 2024-01-25
Attending: OBSTETRICS & GYNECOLOGY
Payer: MEDICARE

## 2024-01-25 ENCOUNTER — PROCEDURE VISIT (OUTPATIENT)
Dept: OBSTETRICS AND GYNECOLOGY | Facility: CLINIC | Age: 74
End: 2024-01-25
Attending: OBSTETRICS & GYNECOLOGY
Payer: MEDICARE

## 2024-01-25 VITALS
HEART RATE: 70 BPM | DIASTOLIC BLOOD PRESSURE: 71 MMHG | SYSTOLIC BLOOD PRESSURE: 139 MMHG | WEIGHT: 152.81 LBS | HEIGHT: 61 IN | BODY MASS INDEX: 28.85 KG/M2

## 2024-01-25 DIAGNOSIS — N95.0 POSTMENOPAUSAL BLEEDING: Primary | ICD-10-CM

## 2024-01-25 DIAGNOSIS — D21.9 FIBROIDS: ICD-10-CM

## 2024-01-25 DIAGNOSIS — N89.8 VAGINAL DISCHARGE: ICD-10-CM

## 2024-01-25 PROCEDURE — 76830 TRANSVAGINAL US NON-OB: CPT | Mod: 26,,, | Performed by: RADIOLOGY

## 2024-01-25 PROCEDURE — 76856 US EXAM PELVIC COMPLETE: CPT | Mod: 26,,, | Performed by: RADIOLOGY

## 2024-01-25 PROCEDURE — 58100 BIOPSY OF UTERUS LINING: CPT | Mod: PBBFAC | Performed by: OBSTETRICS & GYNECOLOGY

## 2024-01-25 PROCEDURE — 88305 TISSUE EXAM BY PATHOLOGIST: CPT | Mod: TC,SUR | Performed by: OBSTETRICS & GYNECOLOGY

## 2024-01-25 PROCEDURE — 76830 TRANSVAGINAL US NON-OB: CPT | Mod: TC

## 2024-01-25 PROCEDURE — 88305 TISSUE EXAM BY PATHOLOGIST: CPT | Mod: 26,,, | Performed by: PATHOLOGY

## 2024-01-26 LAB
ESTROGEN SERPL-MCNC: NORMAL PG/ML
INSULIN SERPL-ACNC: NORMAL U[IU]/ML
LAB AP CLINICAL INFORMATION: NORMAL
LAB AP GROSS DESCRIPTION: NORMAL
LAB AP LABORATORY NOTES: NORMAL
T3RU NFR SERPL: NORMAL %

## 2024-01-29 NOTE — PROCEDURES
Endometrial Biopsy- Today    Date/Time: 1/25/2024 9:30 AM    Performed by: Emiliano Portillo MD  Authorized by: Emiliano Portillo MD    Consent:     Consent obtained:  Prior to procedure the appropriate consent was completed and verified    Consent given by:  Patient    Patient questions answered: yes      Patient agrees, verbalizes understanding, and wants to proceed: yes      Educational handouts given: yes      Instructions and paperwork completed: yes    Indication:     Indications: Post-menopausal bleeding      Chronicity of post-menopausal bleeding:  New  Pre-procedure:     Pre-procedure timeout performed: yes    Procedure:     Procedure: endometrial biopsy with P[place]      Cervix cleaned and prepped: yes      Uterus sounded: yes      Uterus sound depth (cm):  7    Specimen collected: specimen collected and sent to pathology      Patient tolerated procedure well with no complications: yes    Comments:     Procedure comments:  The patient is to return next week to discuss findings.

## 2024-01-31 ENCOUNTER — OFFICE VISIT (OUTPATIENT)
Dept: OBSTETRICS AND GYNECOLOGY | Facility: CLINIC | Age: 74
End: 2024-01-31
Payer: MEDICARE

## 2024-01-31 VITALS
HEART RATE: 77 BPM | HEIGHT: 61 IN | BODY MASS INDEX: 28.7 KG/M2 | SYSTOLIC BLOOD PRESSURE: 145 MMHG | DIASTOLIC BLOOD PRESSURE: 65 MMHG | WEIGHT: 152 LBS

## 2024-01-31 DIAGNOSIS — N89.8 VAGINAL DISCHARGE: Primary | ICD-10-CM

## 2024-01-31 LAB
CANDIDA SPECIES: NEGATIVE
GARDNERELLA: NEGATIVE
TRICHOMONAS: NEGATIVE

## 2024-01-31 PROCEDURE — 87660 TRICHOMONAS VAGIN DIR PROBE: CPT | Mod: ,,, | Performed by: CLINICAL MEDICAL LABORATORY

## 2024-01-31 PROCEDURE — 99213 OFFICE O/P EST LOW 20 MIN: CPT | Mod: PBBFAC | Performed by: OBSTETRICS & GYNECOLOGY

## 2024-01-31 PROCEDURE — 87480 CANDIDA DNA DIR PROBE: CPT | Mod: ,,, | Performed by: CLINICAL MEDICAL LABORATORY

## 2024-01-31 PROCEDURE — 99212 OFFICE O/P EST SF 10 MIN: CPT | Mod: S$PBB,,, | Performed by: OBSTETRICS & GYNECOLOGY

## 2024-01-31 PROCEDURE — 87510 GARDNER VAG DNA DIR PROBE: CPT | Mod: ,,, | Performed by: CLINICAL MEDICAL LABORATORY

## 2024-01-31 NOTE — PROGRESS NOTES
Lynda Keshav female  for   Chief Complaint   Patient presents with    Follow-up     1wk f/u EMB; repeat affirm.      OB History          1    Para   1    Term   1            AB        Living             SAB        IAB        Ectopic        Multiple        Live Births                      Past Medical History:   Diagnosis Date    Benign essential HTN     Carpal tunnel syndrome, bilateral upper limbs     Chronic low back pain     Degeneration, intervertebral disc, lumbosacral     GERD (gastroesophageal reflux disease)     Hypercholesterolemia     Insomnia     Iron deficiency anemia     Localized swelling, mass and lump, right lower limb     Lower extremity edema     Lumbar spondylosis     Prediabetes     Restless leg     Slow transit constipation     Spinal stenosis, lumbar     Vitamin D deficiency       Past Surgical History:   Procedure Laterality Date    CATARACT EXTRACTION Left 09/10/2020    CATARACT EXTRACTION Right 10/01/2020    Dr. Stephen Fox    CHOLECYSTECTOMY      COSMETIC SURGERY      eyelid    EPIDURAL STEROID INJECTION INTO LUMBAR SPINE N/A 2022    Procedure: Injection-steroid-epidural-lumbar  L3-L4  CHRIS  NO SEDATION;  Surgeon: Esther Delgado MD;  Location: [facility];  Service: Pain Management;  Laterality: N/A;  STATES HAD VAC  WILL BRING CARD    EPIDURAL STEROID INJECTION INTO LUMBAR SPINE Bilateral 2023    Procedure: L3-4 CHRIS;  Surgeon: Esther Delgado MD;  Location: [facility];  Service: Pain Management;  Laterality: Bilateral;  NO ANESTHESIA    EPIDURAL STEROID INJECTION INTO LUMBAR SPINE Bilateral 2023    Procedure: L3-4 CHRIS;  Surgeon: Esther Delgado MD;  Location: [facility];  Service: Pain Management;  Laterality: Bilateral;  no sedation    HIP ARTHROPLASTY Left 10/2015    repair of torn gluteus medius muscle via hip arthroscopy at [facility] Dr. Yoli Lui    HIP ARTHROPLASTY Right     repair of tendon at [facility]    LUMBAR DISCECTOMY  2019    SHOULDER ARTHROSCOPY W/ ROTATOR CUFF REPAIR  2005    SINUS SURGERY  02/2011    TUBAL LIGATION      VASCULAR SURGERY  12/15/2017    R SSV Laser Ablation per Dr. Herman Bullard      Review of patient's allergies indicates:   Allergen Reactions    Ace inhibitors Other (See Comments)             Physical exam:     General Appearance: healthy    Abdomen:Normal, benign.    Pelvic: Pelvic exam: normal external genitalia, vulva, vagina, cervix, uterus and adnexa, VULVA: normal appearing vulva with no masses, tenderness or lesions, CERVIX: normal appearing cervix without discharge or lesions, UTERUS: uterus is normal size, shape, consistency and nontender, ADNEXA: normal adnexa in size, nontender and no masses, RECTAL:deferred normal rectal, no masses, guaiac negative stool obtained.     Extremity:normal    Skin: normal exam        Assessment:   Problem List Items Addressed This Visit    None  Visit Diagnoses       Vaginal discharge    -  Primary    Relevant Orders    Bacterial Vaginosis             Plan:  The patient is affirm test was repeated today.  Pathology report from her surgical endometrial biopsy was benign.

## 2024-02-14 ENCOUNTER — PATIENT MESSAGE (OUTPATIENT)
Dept: FAMILY MEDICINE | Facility: CLINIC | Age: 74
End: 2024-02-14
Payer: MEDICARE

## 2024-02-14 DIAGNOSIS — K21.9 GASTROESOPHAGEAL REFLUX DISEASE, UNSPECIFIED WHETHER ESOPHAGITIS PRESENT: ICD-10-CM

## 2024-02-14 DIAGNOSIS — I10 BENIGN ESSENTIAL HTN: Primary | ICD-10-CM

## 2024-02-14 RX ORDER — LOSARTAN POTASSIUM 25 MG/1
25 TABLET ORAL DAILY
Qty: 90 TABLET | Refills: 1 | Status: SHIPPED | OUTPATIENT
Start: 2024-02-14 | End: 2024-04-17 | Stop reason: SDUPTHER

## 2024-02-14 RX ORDER — HYDROGEN PEROXIDE 3 %
20 SOLUTION, NON-ORAL MISCELLANEOUS
Qty: 90 CAPSULE | Refills: 1 | Status: SHIPPED | OUTPATIENT
Start: 2024-02-14 | End: 2024-04-17 | Stop reason: SDUPTHER

## 2024-02-19 DIAGNOSIS — M54.16 LUMBAR RADICULOPATHY: Primary | ICD-10-CM

## 2024-02-20 ENCOUNTER — HOSPITAL ENCOUNTER (OUTPATIENT)
Dept: RADIOLOGY | Facility: HOSPITAL | Age: 74
Discharge: HOME | End: 2024-02-20
Attending: ORTHOPAEDIC SURGERY
Payer: MEDICARE

## 2024-02-20 ENCOUNTER — OFFICE VISIT (OUTPATIENT)
Dept: SPINE | Facility: CLINIC | Age: 74
End: 2024-02-20
Payer: MEDICARE

## 2024-02-20 DIAGNOSIS — M54.16 LUMBAR RADICULOPATHY: ICD-10-CM

## 2024-02-20 DIAGNOSIS — M51.36 DDD (DEGENERATIVE DISC DISEASE), LUMBAR: Primary | ICD-10-CM

## 2024-02-20 DIAGNOSIS — M48.062 LUMBAR STENOSIS WITH NEUROGENIC CLAUDICATION: ICD-10-CM

## 2024-02-20 PROCEDURE — 99211 OFF/OP EST MAY X REQ PHY/QHP: CPT | Mod: PBBFAC,25 | Performed by: ORTHOPAEDIC SURGERY

## 2024-02-20 PROCEDURE — 72110 X-RAY EXAM L-2 SPINE 4/>VWS: CPT | Mod: TC

## 2024-02-20 PROCEDURE — 72110 X-RAY EXAM L-2 SPINE 4/>VWS: CPT | Mod: 26,,, | Performed by: ORTHOPAEDIC SURGERY

## 2024-02-20 PROCEDURE — 99214 OFFICE O/P EST MOD 30 MIN: CPT | Mod: S$PBB,,, | Performed by: ORTHOPAEDIC SURGERY

## 2024-02-20 NOTE — PROGRESS NOTES
AP, lateral, flexion-extension views of the lumbar spine reviewed    On the AP there is normal coronal alignment.  There are 5 non-rib-bearing lumbar vertebrae.  On the lateral there is maintained lumbar lordosis.  She is status post L4-5 laminectomy and fusion with interbody.  No signs of hardware loosening or failure.    Impression:  Spondylotic changes of the lumbar spine as noted above

## 2024-02-20 NOTE — PROGRESS NOTES
MDM/time:  Greater than 30 minutes spent on this encounter including 10 minutes reviewing imaging and notes, 15 minutes with the patient, 5 minutes documentation    ASSESSMENT:  73 y.o. female with lumbar spondylosis and radiculopathy, status post L4-5 TLIF October 2019, now with back pain around the thoracolumbar junction    PLAN:  Follow-up 3 months.  She is going to consider further injections with Dr. Delgado prior to deciding on surgery    HPI:  73 y.o. female here for repeat evaluation of lumbar spondylosis and back pain.  Reports that lately her back pain and leg pain have been getting worse.  She was getting injections with Dr. Delgado and was getting several months of relief.  She had an injection in November that started to wear off in January.  She had right hip pain and was diagnosed with a gluteus minimus tear which was fixed at [facility] in [place] with a hip scope on 09/09/2022.  Status post L4-5 TLIF October 2019. Reports that lately she has had worsening back pain as well as fatigue in her legs with prolonged standing walking and pain into the bilateral hips and buttocks.  Denies any recent injuries.      IMAGING:  X-rays lumbar spine reviewed show:  On the AP there is normal coronal alignment.  There are 5 non-rib-bearing lumbar vertebrae.  On the lateral there is maintained lumbar lordosis.  She is status post L4-5 laminectomy and fusion with interbody.  No signs of hardware loosening or failure.    MRI lumbar spine 05/19/2022 reviewed shows:  At L3-4 there is severe central bilateral lateral recess stenosis.  Moderate bilateral foraminal stenosis  At L4-5 there is prior TLIF with satisfactory decompression  At L5-S1 there is right paracentral disc protrusion with moderate right lateral recess stenosis and moderate bilateral foraminal stenosis, consistent with prior MRI    Past Medical History:   Diagnosis Date    Benign essential HTN     Carpal tunnel syndrome, bilateral upper limbs      Chronic low back pain     Degeneration, intervertebral disc, lumbosacral     GERD (gastroesophageal reflux disease)     Hypercholesterolemia     Insomnia     Iron deficiency anemia     Localized swelling, mass and lump, right lower limb     Lower extremity edema     Lumbar spondylosis     Prediabetes     Restless leg     Slow transit constipation     Spinal stenosis, lumbar     Vitamin D deficiency      Past Surgical History:   Procedure Laterality Date    CATARACT EXTRACTION Left 09/10/2020    CATARACT EXTRACTION Right 10/01/2020    Dr. Stephen Fox    CHOLECYSTECTOMY  1990    COSMETIC SURGERY      eyelid    EPIDURAL STEROID INJECTION INTO LUMBAR SPINE N/A 07/21/2022    Procedure: Injection-steroid-epidural-lumbar  L3-L4  CHRIS  NO SEDATION;  Surgeon: Esther Delgado MD;  Location: [facility];  Service: Pain Management;  Laterality: N/A;  STATES HAD VAC  WILL BRING CARD    EPIDURAL STEROID INJECTION INTO LUMBAR SPINE Bilateral 03/16/2023    Procedure: L3-4 CHRIS;  Surgeon: Esther Delgado MD;  Location: [facility];  Service: Pain Management;  Laterality: Bilateral;  NO ANESTHESIA    EPIDURAL STEROID INJECTION INTO LUMBAR SPINE Bilateral 11/21/2023    Procedure: L3-4 CHRIS;  Surgeon: Esther Delgado MD;  Location: [facility];  Service: Pain Management;  Laterality: Bilateral;  no sedation    HIP ARTHROPLASTY Left 10/2015    repair of torn gluteus medius muscle via hip arthroscopy at [facility] Dr. Yoli Lui    HIP ARTHROPLASTY Right 2023    repair of tendon at [facility]    LUMBAR DISCECTOMY  2019    SHOULDER ARTHROSCOPY W/ ROTATOR CUFF REPAIR  2005    SINUS SURGERY  02/2011    TUBAL LIGATION      VASCULAR SURGERY  12/15/2017    R [facility] Laser Ablation per Dr. Herman Bullard     Social History     Tobacco Use    Smoking status: Never     Passive exposure: Never    Smokeless tobacco: Never   Substance Use Topics    Alcohol use: Yes     Alcohol/week: 4.0 standard drinks of  alcohol     Types: 4 Glasses of wine per week     Comment: per week    Drug use: Never      Current Outpatient Medications   Medication Instructions    aspirin 81 mg, Oral, Daily    esomeprazole (NEXIUM) 20 mg, Oral, Before breakfast    ferrous sulfate (FEOSOL) 325 mg, Oral, With breakfast    fluticasone propionate (FLONASE) 100 mcg, Each Nostril, Daily    losartan (COZAAR) 25 mg, Oral, Daily    meloxicam (MOBIC) 7.5 mg, Oral, Daily, Take with food    metFORMIN (GLUCOPHAGE) 500 mg, Oral, 3 times daily    multivitamin (THERAGRAN) per tablet 1 tablet, Oral, Daily    pravastatin (PRAVACHOL) 10 mg, Oral, Nightly    pyridoxine (vitamin B6) (B-6) 50 mg, Oral, Daily    vitamin D (VITAMIN D3) 1,000 Units, Oral, Daily    zinc gluconate 50 mg, Oral, Daily        EXAM:  Constitutional  General Appearance:  There is no height or weight on file to calculate BMI., NAD  Psychiatric   Orientation: Oriented to time, oriented to place, oriented to person  Mood and Affect: Active and alert, normal mood, normal affect  Gait and Station   Appearance:  Normal gait, normal tandem gait, able to walk on toes, able to walk on heels  Healed incision  5/5 strength  Sensation intact  2+ pulses

## 2024-02-23 ENCOUNTER — HOSPITAL ENCOUNTER (OUTPATIENT)
Dept: RADIOLOGY | Facility: HOSPITAL | Age: 74
Discharge: HOME | End: 2024-02-23
Payer: MEDICARE

## 2024-02-23 DIAGNOSIS — Z12.31 VISIT FOR SCREENING MAMMOGRAM: ICD-10-CM

## 2024-02-23 PROCEDURE — 77063 BREAST TOMOSYNTHESIS BI: CPT | Mod: 26,,, | Performed by: RADIOLOGY

## 2024-02-23 PROCEDURE — 77067 SCR MAMMO BI INCL CAD: CPT | Mod: TC

## 2024-02-23 PROCEDURE — 77067 SCR MAMMO BI INCL CAD: CPT | Mod: 26,,, | Performed by: RADIOLOGY

## 2024-03-07 ENCOUNTER — PATIENT MESSAGE (OUTPATIENT)
Dept: FAMILY MEDICINE | Facility: CLINIC | Age: 74
End: 2024-03-07
Payer: MEDICARE

## 2024-03-07 DIAGNOSIS — M25.551 RIGHT HIP PAIN: Primary | ICD-10-CM

## 2024-03-14 ENCOUNTER — HOSPITAL ENCOUNTER (OUTPATIENT)
Dept: RADIOLOGY | Facility: HOSPITAL | Age: 74
Discharge: HOME | End: 2024-03-14
Attending: NURSE PRACTITIONER
Payer: MEDICARE

## 2024-03-14 DIAGNOSIS — M25.551 RIGHT HIP PAIN: ICD-10-CM

## 2024-03-14 PROCEDURE — 73721 MRI JNT OF LWR EXTRE W/O DYE: CPT | Mod: TC,RT

## 2024-03-20 ENCOUNTER — OFFICE VISIT (OUTPATIENT)
Dept: PAIN MEDICINE | Facility: CLINIC | Age: 74
End: 2024-03-20
Payer: MEDICARE

## 2024-03-20 VITALS
RESPIRATION RATE: 18 BRPM | WEIGHT: 153 LBS | HEART RATE: 73 BPM | DIASTOLIC BLOOD PRESSURE: 69 MMHG | SYSTOLIC BLOOD PRESSURE: 160 MMHG | HEIGHT: 61 IN | BODY MASS INDEX: 28.89 KG/M2

## 2024-03-20 DIAGNOSIS — M96.1 POSTLAMINECTOMY SYNDROME OF LUMBAR REGION: Chronic | ICD-10-CM

## 2024-03-20 DIAGNOSIS — M54.17 LUMBOSACRAL RADICULOPATHY: Primary | Chronic | ICD-10-CM

## 2024-03-20 DIAGNOSIS — M54.50 LOW BACK PAIN, UNSPECIFIED BACK PAIN LATERALITY, UNSPECIFIED CHRONICITY, UNSPECIFIED WHETHER SCIATICA PRESENT: Chronic | ICD-10-CM

## 2024-03-20 PROCEDURE — 99215 OFFICE O/P EST HI 40 MIN: CPT | Mod: PBBFAC | Performed by: PAIN MEDICINE

## 2024-03-20 PROCEDURE — 99214 OFFICE O/P EST MOD 30 MIN: CPT | Mod: S$PBB,,, | Performed by: PAIN MEDICINE

## 2024-03-20 NOTE — PATIENT INSTRUCTIONS
L5-S1 CHRIS April 4, 2024 @ 1:35pm      Procedure Instructions:    Nothing to eat or drink for 8 hours or after midnight including gum, candy, mints, or tobacco products.  If you are scheduled for 1:30 or later nothing to eat or drink after 5 a.m. the morning of the procedure, including gum, candy, mints, or tobacco products.  Must have a  at least 18 yrs of age to stay present at all times  No Diabetic medications the morning of procedure, check blood sugar the morning of procedure, if it is greater than 200 call the office at [phone]  If you are started on antibiotics or have been prescribed antibiotics, have a fever, or have any other type of infection call to reschedule procedure.  If you take blood pressure medications you can take it at your regular scheduled time with a small sip of WATER!  Dentures are to be removed prior to procedure or we can provide you with a denture cup to remove them prior to being taken back for procedure.   False eyelashes are to be removed before the morning of procedure.    Contacts will have to be removed prior to procedure.  No jewelry is to be worn to the procedure.     HOLD ASPIRIN AND ASPIRIN PRODUCTS  (ASPIRIN, BC POWDER ETC. ) FOR 7 DAYS  PRIOR TO PROCEDURE  HOLD NSAIDS( ibuprofen, mobic, meloxicam, advil, diclofenac, naproxen, relafen, celebrex,  methotrexate, aleve etc....)  FOR 3 DAYS   PRIOR TO PROCEDURE

## 2024-03-20 NOTE — H&P (VIEW-ONLY)
She Disclaimer: This note has been generated using voice-recognition software. There may be typographical errors that have been missed during proof-reading        Patient ID: Lynda Parr is a 73 y.o. female.      Chief Complaint: Low-back Pain      73-year-old female returns for re-evaluation of intractable lower back and bilateral hip pain.  Her pain is exacerbated with prolonged sitting, standing and lying flat.  She received an L3-4 epidural steroid injection November 21, 2023 and experienced 100% pain relief for 2-3 months.  The pain returned after strenuous activities and golfing. The pain starts in the lower back and radiates to the bilateral lower extremities and feet.  She notes paresthesia but denies weakness, falls, bowel or bladder involvement. MRI of the lumbar spine from 01/20/2024 revealed multilevel degenerative changes, L3-4 disc bulge and severe stenosis, L4-5 foraminal stenosis and facet hypertrophy, L5-S1 right paracentral disc protrusion.                Pain Assessment  Pain Assessment: 0-10  Pain Score:   8  Pain Location: Back  Pain Descriptors: Sharp, Aching, Burning  Pain Frequency: Intermittent  Pain Onset: Awakened from sleep  Clinical Progression: Gradually worsening  Aggravating Factors: Bending, Other (Comment), Standing (lying)  Pain Intervention(s): Medication (See eMAR), Home medication, Heat applied      A's of Opioid Risk Assessment  Activity:Patient can perform ADL.   Analgesia: None  Adverse Effects: Patient denies constipation or sedation.  Aberrant Behavior:  reviewed with no aberrant drug seeking/taking behavior.      Patient denies any suicidal or homicidal ideations    Physical Therapy/Home Exercise: no          Review of Systems   Constitutional: Negative.    HENT: Negative.     Eyes: Negative.    Respiratory: Negative.     Cardiovascular: Negative.    Gastrointestinal: Negative.    Endocrine: Negative.    Genitourinary: Negative.    Musculoskeletal:  Positive for back  pain, gait problem and leg pain (BLE).   Integumentary:  Negative.   Hematological: Negative.    Psychiatric/Behavioral:  Positive for sleep disturbance.              Past Medical History:   Diagnosis Date    Benign essential HTN     Carpal tunnel syndrome, bilateral upper limbs     Chronic low back pain     Degeneration, intervertebral disc, lumbosacral     GERD (gastroesophageal reflux disease)     Hypercholesterolemia     Insomnia     Iron deficiency anemia     Localized swelling, mass and lump, right lower limb     Lower extremity edema     Lumbar spondylosis     Prediabetes     Restless leg     Slow transit constipation     Spinal stenosis, lumbar     Vitamin D deficiency      Past Surgical History:   Procedure Laterality Date    CATARACT EXTRACTION Left 09/10/2020    CATARACT EXTRACTION Right 10/01/2020    Dr. Stephen Fox    CHOLECYSTECTOMY  1990    COSMETIC SURGERY      eyelid    EPIDURAL STEROID INJECTION INTO LUMBAR SPINE N/A 07/21/2022    Procedure: Injection-steroid-epidural-lumbar  L3-L4  CHRIS  NO SEDATION;  Surgeon: Esther Delgado MD;  Location: [facility];  Service: Pain Management;  Laterality: N/A;  STATES HAD VAC  WILL BRING CARD    EPIDURAL STEROID INJECTION INTO LUMBAR SPINE Bilateral 03/16/2023    Procedure: L3-4 CHRIS;  Surgeon: Esther Delgado MD;  Location: [facility];  Service: Pain Management;  Laterality: Bilateral;  NO ANESTHESIA    EPIDURAL STEROID INJECTION INTO LUMBAR SPINE Bilateral 11/21/2023    Procedure: L3-4 CHRIS;  Surgeon: Esther Delgado MD;  Location: [facility];  Service: Pain Management;  Laterality: Bilateral;  no sedation    HIP ARTHROPLASTY Left 10/2015    [facility]    HIP ARTHROPLASTY Right 2023    [facility] tendon [facility]    LUMBAR DISCECTOMY  2019    SHOULDER ARTHROSCOPY W/ ROTATOR CUFF REPAIR  2005    SINUS SURGERY   2011    TUBAL LIGATION      VASCULAR SURGERY  12/15/2017    R [facility] Laser Ablation per Dr. Herman Bullard     Social History     Socioeconomic History    Marital status:     Number of children: 1   Tobacco Use    Smoking status: Never     Passive exposure: Never    Smokeless tobacco: Never   Substance and Sexual Activity    Alcohol use: Yes     Alcohol/week: 4.0 standard drinks of alcohol     Types: 4 Glasses of wine per week     Comment: per week    Drug use: Never    Sexual activity: Yes     Partners: Male     Birth control/protection: Post-menopausal     Family History   Problem Relation Age of Onset    Hypertension Mother             Coronary artery disease Father     Diabetes Father             Heart disease Father             Pulmonary embolism Brother     No Known Problems Son      Review of patient's allergies indicates:   Allergen Reactions    Ace inhibitors Other (See Comments)     has a current medication list which includes the following prescription(s): aspirin, esomeprazole, ferrous sulfate, fluticasone propionate, losartan, meloxicam, metformin, multivitamin, pravastatin, pyridoxine (vitamin b6), vitamin d, and zinc gluconate.      Objective:  Vitals:    24 1449   BP: (!) 160/69   Pulse: 73   Resp: 18        Physical Exam  Vitals and nursing note reviewed.   Constitutional:       General: She is not in acute distress.     Appearance: Normal appearance. She is not ill-appearing, toxic-appearing or diaphoretic.   HENT:      Head: Normocephalic and atraumatic.      Nose: Nose normal.      Mouth/Throat:      Mouth: Mucous membranes are moist.   Eyes:      Extraocular Movements: Extraocular movements intact.      Pupils: Pupils are equal, round, and reactive to light.   Cardiovascular:      Rate and Rhythm: Normal rate and regular rhythm.      Heart sounds: Normal heart sounds.   Pulmonary:      Effort: Pulmonary effort is normal. No respiratory  distress.      Breath sounds: Normal breath sounds. No stridor. No wheezing or rhonchi.   Abdominal:      General: Bowel sounds are normal.      Palpations: Abdomen is soft.   Musculoskeletal:         General: No swelling or deformity.      Cervical back: Normal and normal range of motion. No spasms or tenderness. No pain with movement. Normal range of motion.      Thoracic back: Normal.      Lumbar back: Spasms, tenderness and bony tenderness present. Decreased range of motion. Negative right straight leg raise test and negative left straight leg raise test. No scoliosis.      Right lower leg: No edema.      Left lower leg: No edema.      Comments: Lumbar pain with flexion, extension and lateral rotation   Skin:     General: Skin is warm.   Neurological:      General: No focal deficit present.      Mental Status: She is alert and oriented to person, place, and time. Mental status is at baseline.      Cranial Nerves: No cranial nerve deficit.      Sensory: Sensation is intact. No sensory deficit.      Motor: No weakness.      Coordination: Coordination normal.      Gait: Gait abnormal.      Deep Tendon Reflexes: Reflexes are normal and symmetric.   Psychiatric:         Mood and Affect: Mood normal.         Behavior: Behavior normal.         Assessment:      1. Lumbosacral radiculopathy    2. Postlaminectomy syndrome of lumbar region    3. Low back pain, unspecified back pain laterality, unspecified chronicity, unspecified whether sciatica present          Plan:  1. Schedule L5-S1 epidural steroid injection for lumbosacral radiculopathy  2.Indications for this procedure for this specific patient include the following   -History, physical examination and concordant radiological image based diagnostic testing supports medical necessity for an epidural steroid injection  - neurogenic claudication due to central disc pathology, osteophyte complexes, severe degenerative disc disease producing foraminal or central  stenosis  - post-laminectomy pain syndrome  -repeat epidural steroid injection is medically necessary.  The 1st injection significantly provided improvement of at least 100 % sustained improvement in pain relief, improvement in function from baseline for least 3 months.  Detail and- Pt has been in pain for at least 6 weeks and has failed conservative care (e.g. Exercise, physical methods, NSAID/ and or muscle relaxants)   - Pt has no major risk factors for spinal cancer or contraindicated condition   - Neurogenic claudication and Radicular pain are interfering with functional activity  - Pain is associated with symptoms of nerve root irritation   - Any numbness documented is accompanied with paresthesia   - No evidence of systemic or local infection, bleeding tendency or unstable medical condition   - Epidural provided as part of a comprehensive pain management program  - All repeat injections have at least 80% pain relief and increase functional gain and physical activity, and reduction in reliance on the use of medication and or physical therapy  - Pt has significant functional limitation resulting in diminished quality of life and impaired age appropriate ADL's.   - Diagnostic evaluation has ruled out other causes of pain  - Pt participating in an active rehabilitation program or home exercise program which has been discussed with the patient including heat ice and rest  - No more than 3 epidurals will be done in a 6 month period at the same level with at least 7 days between injections  - MAC is only offered in cases of needle phobia   - Injection done at L5-S1 level which is consistent with patient's dermatomal pain complaint  3.Monitored Anesthesia Care medical necessity authorization request:    Monitor anesthesia request is medically indicated for the scheduled nerve block procedure due to:  - needle phobia and anxiety, placing  the patient at risk during the provided service.  -patient has severe problems  with muscles and muscle spasticity that makes it hard to lie still  -patient suffers from chronic pain and is unable to function due to  diminished ADLs    4.The planned medically necessary  surgical procedure is performed in a hospital outpatient department and not in an ambulatory surgical center due to:     -there is no geographically assessable ambulatory surgery center that has the  necessary equipment and fluoroscopy needed for the procedure     -there is no geographically assessable ambulatory surgical center available at which the physician has privileges     -an ASC's  specific  guideline regarding the individuals weight or health conditions that prevent the use of an ASC       -injections must be performed under fluoroscopy image guidance with contrast unless the patient has a documented contrast allergy or pregnancy        report:  Reviewed and consistent with medication use as prescribed.      The total time spent for evaluation and management on 03/21/2024 including reviewing separately obtained history, performing a medically appropriate exam and evaluation, documenting clinical information in the health record, independently interpreting results and communicating them to the patient/family/caregiver, and ordering medications/tests/procedures was between 15-29 minutes.    The above plan and management options were discussed at length with patient. Patient is in agreement with the above and verbalized understanding. It will be communicated with the referring physician via electronic record, fax, or mail.

## 2024-03-20 NOTE — PROGRESS NOTES
She Disclaimer: This note has been generated using voice-recognition software. There may be typographical errors that have been missed during proof-reading        Patient ID: Lynda Parr is a 73 y.o. female.      Chief Complaint: Low-back Pain      73-year-old female returns for re-evaluation of intractable lower back and bilateral hip pain.  Her pain is exacerbated with prolonged sitting, standing and lying flat.  She received an L3-4 epidural steroid injection November 21, 2023 and experienced 100% pain relief for 2-3 months.  The pain returned after strenuous activities and golfing. The pain starts in the lower back and radiates to the bilateral lower extremities and feet.  She notes paresthesia but denies weakness, falls, bowel or bladder involvement. MRI of the lumbar spine from 01/20/2024 revealed multilevel degenerative changes, L3-4 disc bulge and severe stenosis, L4-5 foraminal stenosis and facet hypertrophy, L5-S1 right paracentral disc protrusion.                Pain Assessment  Pain Assessment: 0-10  Pain Score:   8  Pain Location: Back  Pain Descriptors: Sharp, Aching, Burning  Pain Frequency: Intermittent  Pain Onset: Awakened from sleep  Clinical Progression: Gradually worsening  Aggravating Factors: Bending, Other (Comment), Standing (lying)  Pain Intervention(s): Medication (See eMAR), Home medication, Heat applied      A's of Opioid Risk Assessment  Activity:Patient can perform ADL.   Analgesia: None  Adverse Effects: Patient denies constipation or sedation.  Aberrant Behavior:  reviewed with no aberrant drug seeking/taking behavior.      Patient denies any suicidal or homicidal ideations    Physical Therapy/Home Exercise: no          Review of Systems   Constitutional: Negative.    HENT: Negative.     Eyes: Negative.    Respiratory: Negative.     Cardiovascular: Negative.    Gastrointestinal: Negative.    Endocrine: Negative.    Genitourinary: Negative.    Musculoskeletal:  Positive for back  pain, gait problem and leg pain (BLE).   Integumentary:  Negative.   Hematological: Negative.    Psychiatric/Behavioral:  Positive for sleep disturbance.              Past Medical History:   Diagnosis Date    Benign essential HTN     Carpal tunnel syndrome, bilateral upper limbs     Chronic low back pain     Degeneration, intervertebral disc, lumbosacral     GERD (gastroesophageal reflux disease)     Hypercholesterolemia     Insomnia     Iron deficiency anemia     Localized swelling, mass and lump, right lower limb     Lower extremity edema     Lumbar spondylosis     Prediabetes     Restless leg     Slow transit constipation     Spinal stenosis, lumbar     Vitamin D deficiency      Past Surgical History:   Procedure Laterality Date    CATARACT EXTRACTION Left 09/10/2020    CATARACT EXTRACTION Right 10/01/2020    Dr. Stephen Fox    CHOLECYSTECTOMY  1990    COSMETIC SURGERY      eyelid    EPIDURAL STEROID INJECTION INTO LUMBAR SPINE N/A 07/21/2022    Procedure: Injection-steroid-epidural-lumbar  L3-L4  CHRIS  NO SEDATION;  Surgeon: Esther Delgado MD;  Location: [facility];  Service: Pain Management;  Laterality: N/A;  STATES HAD VAC  WILL BRING CARD    EPIDURAL STEROID INJECTION INTO LUMBAR SPINE Bilateral 03/16/2023    Procedure: L3-4 CHRIS;  Surgeon: Esther Delgado MD;  Location: [facility];  Service: Pain Management;  Laterality: Bilateral;  NO ANESTHESIA    EPIDURAL STEROID INJECTION INTO LUMBAR SPINE Bilateral 11/21/2023    Procedure: L3-4 CHRIS;  Surgeon: Esther Delgado MD;  Location: [facility];  Service: Pain Management;  Laterality: Bilateral;  no sedation    HIP ARTHROPLASTY Left 10/2015    [facility]    HIP ARTHROPLASTY Right 2023    [facility]    LUMBAR DISCECTOMY  2019    SHOULDER ARTHROSCOPY W/ ROTATOR CUFF REPAIR  2005    SINUS SURGERY   2011    TUBAL LIGATION      VASCULAR SURGERY  12/15/2017    R [facility] Laser Ablation per Dr. Herman Bullard     Social History     Socioeconomic History    Marital status:     Number of children: 1   Tobacco Use    Smoking status: Never     Passive exposure: Never    Smokeless tobacco: Never   Substance and Sexual Activity    Alcohol use: Yes     Alcohol/week: 4.0 standard drinks of alcohol     Types: 4 Glasses of wine per week     Comment: per week    Drug use: Never    Sexual activity: Yes     Partners: Male     Birth control/protection: Post-menopausal     Family History   Problem Relation Age of Onset    Hypertension Mother             Coronary artery disease Father     Diabetes Father             Heart disease Father             Pulmonary embolism Brother     No Known Problems Son      Review of patient's allergies indicates:   Allergen Reactions    Ace inhibitors Other (See Comments)     has a current medication list which includes the following prescription(s): aspirin, esomeprazole, ferrous sulfate, fluticasone propionate, losartan, meloxicam, metformin, multivitamin, pravastatin, pyridoxine (vitamin b6), vitamin d, and zinc gluconate.      Objective:  Vitals:    24 1449   BP: (!) 160/69   Pulse: 73   Resp: 18        Physical Exam  Vitals and nursing note reviewed.   Constitutional:       General: She is not in acute distress.     Appearance: Normal appearance. She is not ill-appearing, toxic-appearing or diaphoretic.   HENT:      Head: Normocephalic and atraumatic.      Nose: Nose normal.      Mouth/Throat:      Mouth: Mucous membranes are moist.   Eyes:      Extraocular Movements: Extraocular movements intact.      Pupils: Pupils are equal, round, and reactive to light.   Cardiovascular:      Rate and Rhythm: Normal rate and regular rhythm.      Heart sounds: Normal heart sounds.   Pulmonary:      Effort: Pulmonary effort is normal. No respiratory  distress.      Breath sounds: Normal breath sounds. No stridor. No wheezing or rhonchi.   Abdominal:      General: Bowel sounds are normal.      Palpations: Abdomen is soft.   Musculoskeletal:         General: No swelling or deformity.      Cervical back: Normal and normal range of motion. No spasms or tenderness. No pain with movement. Normal range of motion.      Thoracic back: Normal.      Lumbar back: Spasms, tenderness and bony tenderness present. Decreased range of motion. Negative right straight leg raise test and negative left straight leg raise test. No scoliosis.      Right lower leg: No edema.      Left lower leg: No edema.      Comments: Lumbar pain with flexion, extension and lateral rotation   Skin:     General: Skin is warm.   Neurological:      General: No focal deficit present.      Mental Status: She is alert and oriented to person, place, and time. Mental status is at baseline.      Cranial Nerves: No cranial nerve deficit.      Sensory: Sensation is intact. No sensory deficit.      Motor: No weakness.      Coordination: Coordination normal.      Gait: Gait abnormal.      Deep Tendon Reflexes: Reflexes are normal and symmetric.   Psychiatric:         Mood and Affect: Mood normal.         Behavior: Behavior normal.         Assessment:      1. Lumbosacral radiculopathy    2. Postlaminectomy syndrome of lumbar region    3. Low back pain, unspecified back pain laterality, unspecified chronicity, unspecified whether sciatica present          Plan:  1. Schedule L5-S1 epidural steroid injection for lumbosacral radiculopathy  2.Indications for this procedure for this specific patient include the following   -History, physical examination and concordant radiological image based diagnostic testing supports medical necessity for an epidural steroid injection  - neurogenic claudication due to central disc pathology, osteophyte complexes, severe degenerative disc disease producing foraminal or central  stenosis  - post-laminectomy pain syndrome  -repeat epidural steroid injection is medically necessary.  The 1st injection significantly provided improvement of at least 100 % sustained improvement in pain relief, improvement in function from baseline for least 3 months.  Detail and- Pt has been in pain for at least 6 weeks and has failed conservative care (e.g. Exercise, physical methods, NSAID/ and or muscle relaxants)   - Pt has no major risk factors for spinal cancer or contraindicated condition   - Neurogenic claudication and Radicular pain are interfering with functional activity  - Pain is associated with symptoms of nerve root irritation   - Any numbness documented is accompanied with paresthesia   - No evidence of systemic or local infection, bleeding tendency or unstable medical condition   - Epidural provided as part of a comprehensive pain management program  - All repeat injections have at least 80% pain relief and increase functional gain and physical activity, and reduction in reliance on the use of medication and or physical therapy  - Pt has significant functional limitation resulting in diminished quality of life and impaired age appropriate ADL's.   - Diagnostic evaluation has ruled out other causes of pain  - Pt participating in an active rehabilitation program or home exercise program which has been discussed with the patient including heat ice and rest  - No more than 3 epidurals will be done in a 6 month period at the same level with at least 7 days between injections  - MAC is only offered in cases of needle phobia   - Injection done at L5-S1 level which is consistent with patient's dermatomal pain complaint  3.Monitored Anesthesia Care medical necessity authorization request:    Monitor anesthesia request is medically indicated for the scheduled nerve block procedure due to:  - needle phobia and anxiety, placing  the patient at risk during the provided service.  -patient has severe problems  with muscles and muscle spasticity that makes it hard to lie still  -patient suffers from chronic pain and is unable to function due to  diminished ADLs    4.The planned medically necessary  surgical procedure is performed in a hospital outpatient department and not in an ambulatory surgical center due to:     -there is no geographically assessable ambulatory surgery center that has the  necessary equipment and fluoroscopy needed for the procedure     -there is no geographically assessable ambulatory surgical center available at which the physician has privileges     -an ASC's  specific  guideline regarding the individuals weight or health conditions that prevent the use of an ASC       -injections must be performed under fluoroscopy image guidance with contrast unless the patient has a documented contrast allergy or pregnancy        report:  Reviewed and consistent with medication use as prescribed.      The total time spent for evaluation and management on 03/21/2024 including reviewing separately obtained history, performing a medically appropriate exam and evaluation, documenting clinical information in the health record, independently interpreting results and communicating them to the patient/family/caregiver, and ordering medications/tests/procedures was between 15-29 minutes.    The above plan and management options were discussed at length with patient. Patient is in agreement with the above and verbalized understanding. It will be communicated with the referring physician via electronic record, fax, or mail.

## 2024-03-20 NOTE — PROGRESS NOTES
She Disclaimer: This note has been generated using voice-recognition software. There may be typographical errors that have been missed during proof-reading        Patient ID: Lynda Parr is a 73 y.o. female.      Chief Complaint: Low-back Pain      HPI        Pain Assessment  Pain Assessment: 0-10  Pain Score:   8  Pain Location: Back  Pain Descriptors: Sharp, Aching, Burning  Pain Frequency: Intermittent  Pain Onset: Awakened from sleep  Clinical Progression: Gradually worsening  Aggravating Factors: Bending, Other (Comment), Standing (lying)  Pain Intervention(s): Medication (See eMAR), Home medication, Heat applied      A's of Opioid Risk Assessment  Activity:Patient *** perform ADL.   Analgesia:Patients pain is *** controlled by current medication.   Adverse Effects: Patient denies constipation or sedation.  Aberrant Behavior:  reviewed with no aberrant drug seeking/taking behavior.      Patient denies any suicidal or homicidal ideations    Physical Therapy/Home Exercise: {YES/NO:63} ***weeks,*** times per week, ***weeks/***months    MRI Hip Without Contrast Right  Narrative: EXAMINATION:  MRI HIP WITHOUT CONTRAST RIGHT    CLINICAL HISTORY:  right hip pain, history of gluteus minimus repair;.  Pain in right hip    COMPARISON:  August 5, 2022    TECHNIQUE:  The right hip was imaged in 3 planes on the 1.5 jing magnet without IV contrast, to include T1, T2, and STIR images.    FINDINGS:  The right femoral head is well conjugated.  There is mild osteophyte formation of the hip, though the hip joint space is well maintained.  There is no abnormal joint effusion.  There is no marrow abnormality to suggest avascular necrosis.  Surgical anchors are noted in the greater trochanter of the proximal femur.  There is no fracture of the partially visualized right sacral ala.    There is some platelike T2 hyperintense signal within the gluteus medius tendon compatible with a small amount of intramuscular partial tear at  and just superior to the greater trochanter level.    There is moderate to prominent relative atrophy of the right gluteus medius and minimus muscles.    No significant findings or changes otherwise  Impression: No acute bony abnormality    Evidence of intramuscular partial tear of the gluteus medius tendon on the right just superior to the greater trochanter level    Electronically signed by: Robert Min  Date:    03/14/2024  Time:    21:08      Review of Systems          Past Medical History:   Diagnosis Date    Benign essential HTN     Carpal tunnel syndrome, bilateral upper limbs     Chronic low back pain     Degeneration, intervertebral disc, lumbosacral     GERD (gastroesophageal reflux disease)     Hypercholesterolemia     Insomnia     Iron deficiency anemia     Localized swelling, mass and lump, right lower limb     Lower extremity edema     Lumbar spondylosis     Prediabetes     Restless leg     Slow transit constipation     Spinal stenosis, lumbar     Vitamin D deficiency      Past Surgical History:   Procedure Laterality Date    CATARACT EXTRACTION Left 09/10/2020    CATARACT EXTRACTION Right 10/01/2020    Dr. Stephen Fox    CHOLECYSTECTOMY  1990    COSMETIC SURGERY      eyelid    EPIDURAL STEROID INJECTION INTO LUMBAR SPINE N/A 07/21/2022    Procedure: Injection-steroid-epidural-lumbar  L3-L4  CHRIS  NO SEDATION;  Surgeon: Esther Delgado MD;  Location: [facility];  Service: Pain Management;  Laterality: N/A;  STATES HAD VAC  WILL BRING CARD    EPIDURAL STEROID INJECTION INTO LUMBAR SPINE Bilateral 03/16/2023    Procedure: L3-4 CHRIS;  Surgeon: Esther Delgado MD;  Location: [facility];  Service: Pain Management;  Laterality: Bilateral;  NO ANESTHESIA    EPIDURAL STEROID INJECTION INTO LUMBAR SPINE Bilateral 11/21/2023    Procedure: L3-4 CHRIS;  Surgeon: Esther Delgado MD;  Location: [facility];  Service: Pain Management;  Laterality: Bilateral;  no sedation    HIP  ARTHROPLASTY Left 10/2015    repair of torn gluteus medius muscle via hip arthroscopy at [facility], Dr. Kent    HIP ARTHROPLASTY Right     repair of tendon at [facility]    LUMBAR DISCECTOMY  2019    SHOULDER ARTHROSCOPY W/ ROTATOR CUFF REPAIR  2005    SINUS SURGERY  2011    TUBAL LIGATION      VASCULAR SURGERY  12/15/2017    R [facility] Laser Ablation per Dr. Herman Bullard     Social History     Socioeconomic History    Marital status:     Number of children: 1   Tobacco Use    Smoking status: Never     Passive exposure: Never    Smokeless tobacco: Never   Substance and Sexual Activity    Alcohol use: Yes     Alcohol/week: 4.0 standard drinks of alcohol     Types: 4 Glasses of wine per week     Comment: per week    Drug use: Never    Sexual activity: Yes     Partners: Male     Birth control/protection: Post-menopausal     Family History   Problem Relation Age of Onset    Hypertension Mother             Coronary artery disease Father     Diabetes Father             Heart disease Father             Pulmonary embolism Brother     No Known Problems Son      Review of patient's allergies indicates:   Allergen Reactions    Ace inhibitors Other (See Comments)     has a current medication list which includes the following prescription(s): aspirin, esomeprazole, ferrous sulfate, fluticasone propionate, losartan, meloxicam, metformin, multivitamin, pravastatin, pyridoxine (vitamin b6), vitamin d, and zinc gluconate.      Objective:  Vitals:    24 1449   BP: (!) 160/69   Pulse: 73   Resp: 18        Physical Exam      Assessment:      1. Lumbosacral radiculopathy          Plan:  1. reviewed  2.Addiction, Dependency, Tolerance, Opioid abuse-misuse, Death, Diversion Discussed. Overdose reversal drug Naloxone discussed.  3.Refill/Continue medications for pain control and function       Requested Prescriptions      No prescriptions requested or ordered in this  encounter     4.  Orders Placed This Encounter   Procedures    Case Request Operating Room: L5-S1 CHRIS     Order Specific Question:   Medical Necessity:     Answer:   Medically Non-Urgent [100]     Order Specific Question:   CPT Code:     Answer:   PA INJ LUMBAR/SACRAL, W/IMAGING GUIDANCE [30878]     Order Specific Question:   Positioning:     Answer:   Prone [1003]     Order Specific Question:   Post-Procedure Disposition:     Answer:   PACU [1]     Order Specific Question:   Estimated Length of Stay:     Answer:   0 midnight     Order Specific Question:   Implant Required:     Answer:   No [1001]     Order Specific Question:   Is an on-site pathologist required for this procedure?     Answer:   N/A      5.     report:  {:[id number]}      The total time spent for evaluation and management on 2024 including reviewing separately obtained history, performing a medically appropriate exam and evaluation, documenting clinical information in the health record, independently interpreting results and communicating them to the patient/family/caregiver, and ordering medications/tests/procedures was between {time based billin} minutes.    The above plan and management options were discussed at length with patient. Patient is in agreement with the above and verbalized understanding. It will be communicated with the referring physician via electronic record, fax, or mail.

## 2024-04-04 ENCOUNTER — HOSPITAL ENCOUNTER (OUTPATIENT)
Facility: HOSPITAL | Age: 74
Discharge: HOME | End: 2024-04-04
Attending: PAIN MEDICINE | Admitting: PAIN MEDICINE
Payer: MEDICARE

## 2024-04-04 VITALS
DIASTOLIC BLOOD PRESSURE: 78 MMHG | TEMPERATURE: 99 F | RESPIRATION RATE: 13 BRPM | HEART RATE: 80 BPM | BODY MASS INDEX: 27.75 KG/M2 | SYSTOLIC BLOOD PRESSURE: 172 MMHG | WEIGHT: 147 LBS | HEIGHT: 61 IN | OXYGEN SATURATION: 100 %

## 2024-04-04 DIAGNOSIS — M54.17 LUMBOSACRAL RADICULOPATHY: ICD-10-CM

## 2024-04-04 PROCEDURE — 63600175 PHARM REV CODE 636 W HCPCS: Performed by: PAIN MEDICINE

## 2024-04-04 PROCEDURE — 62323 NJX INTERLAMINAR LMBR/SAC: CPT | Mod: ,,, | Performed by: PAIN MEDICINE

## 2024-04-04 PROCEDURE — 25500020 PHARM REV CODE 255: Performed by: PAIN MEDICINE

## 2024-04-04 PROCEDURE — 62323 NJX INTERLAMINAR LMBR/SAC: CPT | Performed by: PAIN MEDICINE

## 2024-04-04 RX ORDER — TRIAMCINOLONE ACETONIDE 40 MG/ML
INJECTION, SUSPENSION INTRA-ARTICULAR; INTRAMUSCULAR CODE/TRAUMA/SEDATION MEDICATION
Status: DISCONTINUED | OUTPATIENT
Start: 2024-04-04 | End: 2024-04-04 | Stop reason: HOSPADM

## 2024-04-04 RX ORDER — SODIUM CHLORIDE 9 MG/ML
INJECTION, SOLUTION INTRAVENOUS CONTINUOUS
Status: DISCONTINUED | OUTPATIENT
Start: 2024-04-04 | End: 2024-04-04 | Stop reason: HOSPADM

## 2024-04-04 NOTE — OP NOTE
"Procedure Note    Procedure Date: 4/4/2024    Procedure Performed:  Lumbar interlaminar epidural steroid injection under fluoroscopy at L5-S1    Indications: Patient failed conservative therapy.      Pre-op diagnosis: Lumbar Radiculopathy    Post-op diagnosis: same    Physician: Esther Delgado MD    Anesthesia: MAC    Medications injected: Kenalog 40mg,  2 mL sterile preservative-free normal saline.    Local anesthetic used: 1% Lidocaine, 3 ml    Estimated Blood Loss: None    Complications:  None    Technique:  The patient was interviewed in the holding area and Risks/Benefits were discussed, including, but not limited to, the possibility of new or different pain, bleeding or infection.   All questions were answered.  The patient understood and accepted risks.  Consent was verified and signed.   A time-out was taken to identify patient and procedure prior to starting the procedure. The patient was placed in the prone position on the fluoroscopy table. The area of the lumbar spine was prepped with Chloraprep and draped in a sterile manner. The L5-S1  interspace was identified and marked under AP fluoroscopy. The skin and subcutaneous tissues overlying the targeted interspace were anesthetized with 3-5 mL of 1% lidocaine using a 25G 1.5" needle.  A 20G  3.5" Tuohy epidural needle was directed toward the interspace under fluoroscopic guidance until the ligamentum flavum was engaged. From this point, a loss of resistance technique with a pulsator syringe a was used to identify entrance of the needle into the epidural space. Once loss of resistance was observed 3mL of Isovue contrast solution was injected. An appropriate epidurogram was noted.  A 3mL mixture consisting of saline and 40 mg of kenalog was injected slowly and without resistance.  The needle was  removed and a sterile Band-Aid dressing was applied to the puncture site.  The patient tolerated the procedure well and was transferred to the .AC. in stable " condition.  The patient was monitored after the procedure and was given post-procedure and discharge instructions to follow at home. The patient was discharged in a stable condition and accompanied by an adult .    Epidurogram:5 mL allotment of Isovue M 300 contrast revealed excellent delineation from L4-S1. There were no filling defects or obstruction to dye flow noted.  There was no  intravascular or intrathecal spread noted with dye flow.

## 2024-04-04 NOTE — BRIEF OP NOTE
The  Discharge Note  Short Stay    Admit Date: 4/4/2024    Discharge Date: 4/4/2024    Attending Physician: Esther Delgado     Discharge Provider: Esther Delgado    Diagnosis: Lumbar radiculopathy    Procedure performed: L5-S1 Epidural steroid injection and epiduralgram    Findings: Procedure tolerated well and without complications. Consistent with diagnosis.    EBL: 0cc    Specimens: None    Discharged Condition: Good    Final Diagnoses: Lumbosacral radiculopathy [M54.17]    Disposition: Home or Self Care    Hospital Course: No complications, uneventful    Outcome of Hospitalization, Treatment, Procedure, or Surgery:  Patient was admitted for outpatient interventional pain management procedure. The patient tolerated the procedure well with no complications.    Follow up/Patient Instructions:  Follow up as scheduled in Pain Management office in 3-4 weeks.  Patient has received instructions and follow up date and time.    Medications:  Continue previous medications

## 2024-04-04 NOTE — DISCHARGE SUMMARY
[facility] - Pain Management  Discharge Note  Short Stay    Procedure(s) (LRB):  L5-S1 CHRIS (Bilateral)      OUTCOME: Patient tolerated treatment/procedure well without complication and is now ready for discharge.    DISPOSITION: Home or Self Care    FINAL DIAGNOSIS:  Lumbosacral radiculopathy    FOLLOWUP: In clinic    DISCHARGE INSTRUCTIONS:  See nurse's notes     TIME SPENT ON DISCHARGE: 5 minutes

## 2024-04-04 NOTE — PLAN OF CARE
Plan:  D/c pt via wheelchair at 1440  Informed pt if does not void in 8 hours to go to ER. Notify if redness, drainage, from injection site or fever over next 3-4 days. Rest and drink plenty of fluids for the remainder of the day. No lifting over 5 lbs. For the remainder of the day. Continue regular medications as prescribed. May take pain medications as prescribed.     Pain improved 90%  Pre-procedure pain: 9  Post-procedure pain: 1

## 2024-04-17 ENCOUNTER — OFFICE VISIT (OUTPATIENT)
Dept: FAMILY MEDICINE | Facility: CLINIC | Age: 74
End: 2024-04-17
Payer: MEDICARE

## 2024-04-17 VITALS
HEIGHT: 61 IN | DIASTOLIC BLOOD PRESSURE: 76 MMHG | SYSTOLIC BLOOD PRESSURE: 164 MMHG | WEIGHT: 148 LBS | BODY MASS INDEX: 27.94 KG/M2 | HEART RATE: 69 BPM | OXYGEN SATURATION: 100 %

## 2024-04-17 DIAGNOSIS — K21.9 GASTROESOPHAGEAL REFLUX DISEASE, UNSPECIFIED WHETHER ESOPHAGITIS PRESENT: Chronic | ICD-10-CM

## 2024-04-17 DIAGNOSIS — Z98.1 HISTORY OF LUMBAR SPINAL FUSION: ICD-10-CM

## 2024-04-17 DIAGNOSIS — E78.00 HYPERCHOLESTEREMIA: Chronic | ICD-10-CM

## 2024-04-17 DIAGNOSIS — I10 BENIGN ESSENTIAL HTN: Chronic | ICD-10-CM

## 2024-04-17 DIAGNOSIS — D50.9 IRON DEFICIENCY ANEMIA, UNSPECIFIED IRON DEFICIENCY ANEMIA TYPE: Chronic | ICD-10-CM

## 2024-04-17 DIAGNOSIS — R73.03 PREDIABETES: Primary | Chronic | ICD-10-CM

## 2024-04-17 PROCEDURE — 99214 OFFICE O/P EST MOD 30 MIN: CPT | Mod: ,,, | Performed by: NURSE PRACTITIONER

## 2024-04-17 RX ORDER — METFORMIN HYDROCHLORIDE 500 MG/1
500 TABLET ORAL 3 TIMES DAILY
Qty: 270 TABLET | Refills: 1 | Status: SHIPPED | OUTPATIENT
Start: 2024-04-17

## 2024-04-17 RX ORDER — FLUTICASONE PROPIONATE 50 MCG
2 SPRAY, SUSPENSION (ML) NASAL DAILY
Qty: 16 G | Refills: 1 | Status: SHIPPED | OUTPATIENT
Start: 2024-04-17

## 2024-04-17 RX ORDER — MELOXICAM 7.5 MG/1
7.5 TABLET ORAL DAILY
Qty: 90 TABLET | Refills: 1 | Status: SHIPPED | OUTPATIENT
Start: 2024-04-17 | End: 2024-06-06 | Stop reason: ALTCHOICE

## 2024-04-17 RX ORDER — LOSARTAN POTASSIUM 25 MG/1
25 TABLET ORAL DAILY
Qty: 90 TABLET | Refills: 1 | Status: SHIPPED | OUTPATIENT
Start: 2024-04-17

## 2024-04-17 RX ORDER — PRAVASTATIN SODIUM 10 MG/1
10 TABLET ORAL NIGHTLY
Qty: 90 TABLET | Refills: 1 | Status: SHIPPED | OUTPATIENT
Start: 2024-04-17

## 2024-04-17 RX ORDER — FERROUS SULFATE 325(65) MG
325 TABLET ORAL
Qty: 90 TABLET | Refills: 1 | Status: SHIPPED | OUTPATIENT
Start: 2024-04-17

## 2024-04-17 RX ORDER — HYDROGEN PEROXIDE 3 %
20 SOLUTION, NON-ORAL MISCELLANEOUS
Qty: 90 CAPSULE | Refills: 1 | Status: SHIPPED | OUTPATIENT
Start: 2024-04-17

## 2024-04-17 NOTE — PATIENT INSTRUCTIONS
Lab obtained in clinic today, we will notify you of results and any necessary changes to plan of care   Refills on routine medications   Follow up on 10/08/2024 and as needed; routine medication will be due 10/14/2024

## 2024-04-17 NOTE — PROGRESS NOTES
Clinic note     Patient name: Lynda Parr is a 73 y.o. female   Chief compliant   Chief Complaint   Patient presents with    Medication Refill     Here for med refills c/o of  Constipation        Subjective     History of present illness   In clinic for routine follow up and medication refills     Past Medical History: HTN, HLD, ID anemia, GERD, prediabetes, RLS, insomnia       Last A1c 5.6    Neurology: Dr Carmona  Pain management Dr Delgado   GYN: Dr Portillo  Spine clinic: Dr JUNI Clark     History of  gluteus minimus tear repaired at [facility] in [place] 09/09/2022. Developed right hip pain again and MRI right hip was obtained on 3/14/24.  She was seen at [facility] in [place] after MRI results were available.  She states he believes hip pain is coming from her lower back   She had  L5-S1 LESI by Dr Delgado on 4/4/2024           Social History     Tobacco Use    Smoking status: Never     Passive exposure: Never    Smokeless tobacco: Never   Substance Use Topics    Alcohol use: Yes     Alcohol/week: 4.0 standard drinks of alcohol     Types: 4 Glasses of wine per week     Comment: per week    Drug use: Never       Review of patient's allergies indicates:   Allergen Reactions    Ace inhibitors Other (See Comments)       Past Medical History:   Diagnosis Date    Benign essential HTN     Carpal tunnel syndrome, bilateral upper limbs     Chronic low back pain     Degeneration, intervertebral disc, lumbosacral     GERD (gastroesophageal reflux disease)     Hypercholesterolemia     Insomnia     Iron deficiency anemia     Localized swelling, mass and lump, right lower limb     Lower extremity edema     Lumbar spondylosis     Prediabetes     Restless leg     Slow transit constipation     Spinal stenosis, lumbar     Vitamin D deficiency        Past Surgical History:   Procedure Laterality Date    CATARACT EXTRACTION Left 09/10/2020    CATARACT EXTRACTION Right 10/01/2020    Dr. Stephen Fox    CHOLECYSTECTOMY  1990     COSMETIC SURGERY      eyelid    EPIDURAL STEROID INJECTION INTO LUMBAR SPINE N/A 2022    Procedure: Injection-steroid-epidural-lumbar  L3-L4  CHRIS  NO SEDATION;  Surgeon: Esther Delgado MD;  Location: [facility];  Service: Pain Management;  Laterality: N/A;  STATES HAD VAC  WILL BRING CARD    EPIDURAL STEROID INJECTION INTO LUMBAR SPINE Bilateral 2023    Procedure: L3-4 CHRIS;  Surgeon: Esther Delgado MD;  Location: [facility];  Service: Pain Management;  Laterality: Bilateral;  NO ANESTHESIA    EPIDURAL STEROID INJECTION INTO LUMBAR SPINE Bilateral 2023    Procedure: L3-4 CHRIS;  Surgeon: Esther Delgado MD;  Location: [facility];  Service: Pain Management;  Laterality: Bilateral;  no sedation    EPIDURAL STEROID INJECTION INTO LUMBAR SPINE Bilateral 2024    Procedure: L5-S1 CHRIS;  Surgeon: Esther Delgado MD;  Location: [facility];  Service: Pain Management;  Laterality: Bilateral;    HIP ARTHROPLASTY Left 10/2015    [facility]    HIP ARTHROPLASTY Right     repair of tendon at [facility]    LUMBAR DISCECTOMY  2019    SHOULDER ARTHROSCOPY W/ ROTATOR CUFF REPAIR  2005    SINUS SURGERY  2011    TUBAL LIGATION      VASCULAR SURGERY  12/15/2017    [facility] Ablation per Dr. Herman Bullard        Family History   Problem Relation Name Age of Onset    Hypertension Mother Mom             Coronary artery disease Father Dad     Diabetes Father Dad             Heart disease Father Dad             Pulmonary embolism Brother      No Known Problems Son           Current Outpatient Medications:     aspirin 81 MG Chew, Take 81 mg by mouth once daily., Disp: , Rfl:     esomeprazole (NEXIUM) 20 MG capsule, Take 1 capsule (20 mg total) by mouth before breakfast., Disp: 90 capsule, Rfl: 1    ferrous sulfate (FEOSOL) 325 mg (65 mg iron) Tab tablet, Take 1  tablet (325 mg total) by mouth daily with breakfast., Disp: 90 tablet, Rfl: 1    fluticasone propionate (FLONASE) 50 mcg/actuation nasal spray, 2 sprays (100 mcg total) by Each Nostril route once daily., Disp: 16 g, Rfl: 1    losartan (COZAAR) 25 MG tablet, Take 1 tablet (25 mg total) by mouth once daily., Disp: 90 tablet, Rfl: 1    meloxicam (MOBIC) 7.5 MG tablet, Take 1 tablet (7.5 mg total) by mouth once daily. Take with food, Disp: 90 tablet, Rfl: 1    metFORMIN (GLUCOPHAGE) 500 MG tablet, Take 1 tablet (500 mg total) by mouth 3 (three) times daily., Disp: 270 tablet, Rfl: 1    multivitamin (THERAGRAN) per tablet, Take 1 tablet by mouth once daily., Disp: , Rfl:     pravastatin (PRAVACHOL) 10 MG tablet, Take 1 tablet (10 mg total) by mouth every evening., Disp: 90 tablet, Rfl: 1    pyridoxine, vitamin B6, (B-6) 100 MG Tab, Take 50 mg by mouth once daily., Disp: , Rfl:     vitamin D (VITAMIN D3) 1000 units Tab, Take 1,000 Units by mouth once daily., Disp: , Rfl:     zinc gluconate 50 mg tablet, Take 50 mg by mouth once daily., Disp: , Rfl:     Review of Systems   Constitutional:  Negative for activity change, appetite change, chills, fatigue, fever and unexpected weight change.   HENT:  Negative for hearing loss, rhinorrhea and trouble swallowing.    Eyes:  Negative for discharge and visual disturbance.   Respiratory:  Negative for cough, chest tightness, shortness of breath and wheezing.    Cardiovascular:  Negative for chest pain, palpitations and leg swelling.   Gastrointestinal:  Positive for constipation. Negative for abdominal pain, blood in stool, change in bowel habit, diarrhea, nausea and vomiting.   Endocrine: Negative for polydipsia and polyuria.   Genitourinary:  Negative for difficulty urinating, dysuria, frequency, hematuria and menstrual problem.   Musculoskeletal:  Positive for arthralgias. Negative for gait problem, joint swelling, myalgias and neck pain.   Neurological:  Negative for dizziness,  "syncope, weakness, light-headedness and headaches.   Psychiatric/Behavioral:  Negative for confusion, dysphoric mood and sleep disturbance. The patient is not nervous/anxious.        Objective     BP (!) 164/76   Pulse 69   Ht 5' 1" (1.549 m)   Wt 67.1 kg (148 lb)   SpO2 100%   BMI 27.96 kg/m²     Physical Exam   Constitutional: She is oriented to person, place, and time. No distress.   HENT:   Head: Atraumatic.   Mouth/Throat: Mucous membranes are moist.   Cardiovascular: Normal rate and regular rhythm. Pulmonary:      Effort: Pulmonary effort is normal. No respiratory distress.      Breath sounds: Normal breath sounds. No wheezing, rhonchi or rales.     Abdominal: Soft. Bowel sounds are normal. She exhibits no distension. There is no abdominal tenderness.   Musculoskeletal:         General: Normal range of motion.      Cervical back: Neck supple.      Right lower leg: No edema.      Left lower leg: No edema.   Neurological: She is alert and oriented to person, place, and time. Gait normal.   Skin: Skin is warm and dry.   Psychiatric: Her behavior is normal. Mood normal.       Lab Results   Component Value Date    WBC 7.71 10/12/2023    HGB 11.4 (L) 10/12/2023    HCT 36.2 (L) 10/12/2023    MCV 95.0 10/12/2023     10/12/2023       CMP  Sodium   Date Value Ref Range Status   09/07/2023 140 136 - 145 mmol/L Final     Potassium   Date Value Ref Range Status   09/07/2023 4.1 3.5 - 5.1 mmol/L Final     Chloride   Date Value Ref Range Status   09/07/2023 106 98 - 107 mmol/L Final     CO2   Date Value Ref Range Status   09/07/2023 24 21 - 32 mmol/L Final     Glucose   Date Value Ref Range Status   09/07/2023 95 74 - 106 mg/dL Final     BUN   Date Value Ref Range Status   09/07/2023 13 7 - 18 mg/dL Final     Creatinine   Date Value Ref Range Status   09/07/2023 0.70 0.55 - 1.02 mg/dL Final     Calcium   Date Value Ref Range Status   09/07/2023 9.1 8.5 - 10.1 mg/dL Final     Total Protein   Date Value Ref Range " Status   09/07/2023 7.6 6.4 - 8.2 g/dL Final     Albumin   Date Value Ref Range Status   09/07/2023 2.8 (L) 3.5 - 5.0 g/dL Final     Bilirubin, Total   Date Value Ref Range Status   09/07/2023 0.3 >0.0 - 1.2 mg/dL Final     Alk Phos   Date Value Ref Range Status   09/07/2023 79 55 - 142 U/L Final     AST   Date Value Ref Range Status   09/07/2023 20 15 - 37 U/L Final     ALT   Date Value Ref Range Status   09/07/2023 25 13 - 56 U/L Final     Anion Gap   Date Value Ref Range Status   09/07/2023 14 7 - 16 mmol/L Final     eGFR   Date Value Ref Range Status   05/18/2022 75 >=60 mL/min/1.73m² Final     Lab Results   Component Value Date    TSH 2.210 10/04/2021     Lab Results   Component Value Date    CHOL 250 (H) 10/12/2023    CHOL 206 (H) 10/05/2022    CHOL 185 10/04/2021     Lab Results   Component Value Date    HDL 76 (H) 10/12/2023    HDL 87 (H) 10/05/2022    HDL 71 (H) 10/04/2021     Lab Results   Component Value Date    LDLCALC 157 10/12/2023    LDLCALC 107 10/05/2022    LDLCALC 101 10/04/2021     Lab Results   Component Value Date    TRIG 83 10/12/2023    TRIG 61 10/05/2022    TRIG 65 10/04/2021     Lab Results   Component Value Date    CHOLHDL 3.3 10/12/2023    CHOLHDL 2.4 10/05/2022    CHOLHDL 2.6 10/04/2021     Lab Results   Component Value Date    HGBA1C 5.6 10/12/2023         Assessment and Plan   Prediabetes  -     Hemoglobin A1C; Future; Expected date: 04/17/2024  -     metFORMIN (GLUCOPHAGE) 500 MG tablet; Take 1 tablet (500 mg total) by mouth 3 (three) times daily.  Dispense: 270 tablet; Refill: 1    Benign essential HTN  -     CBC Auto Differential; Future; Expected date: 04/17/2024  -     Comprehensive Metabolic Panel; Future; Expected date: 04/17/2024  -     losartan (COZAAR) 25 MG tablet; Take 1 tablet (25 mg total) by mouth once daily.  Dispense: 90 tablet; Refill: 1    Hypercholesteremia  -     Lipid Panel; Future; Expected date: 04/17/2024  -     pravastatin (PRAVACHOL) 10 MG tablet; Take 1  tablet (10 mg total) by mouth every evening.  Dispense: 90 tablet; Refill: 1    Gastroesophageal reflux disease, unspecified whether esophagitis present  -     esomeprazole (NEXIUM) 20 MG capsule; Take 1 capsule (20 mg total) by mouth before breakfast.  Dispense: 90 capsule; Refill: 1    Iron deficiency anemia, unspecified iron deficiency anemia type  -     ferrous sulfate (FEOSOL) 325 mg (65 mg iron) Tab tablet; Take 1 tablet (325 mg total) by mouth daily with breakfast.  Dispense: 90 tablet; Refill: 1    History of lumbar spinal fusion  -     meloxicam (MOBIC) 7.5 MG tablet; Take 1 tablet (7.5 mg total) by mouth once daily. Take with food  Dispense: 90 tablet; Refill: 1    Other orders  -     fluticasone propionate (FLONASE) 50 mcg/actuation nasal spray; 2 sprays (100 mcg total) by Each Nostril route once daily.  Dispense: 16 g; Refill: 1          Patient Instructions  Patient Instructions   Lab obtained in clinic today, we will notify you of results and any necessary changes to plan of care   Refills on routine medications   Follow up on 10/08/2024 and as needed; routine medication will be due 10/14/2024

## 2024-04-24 NOTE — PROGRESS NOTES
Subjective:         Patient ID: Lynda Parr is a 73 y.o. female.    Chief Complaint: Back Pain      Pain  This is a chronic problem. The current episode started more than 1 year ago. The problem occurs daily. The problem has been waxing and waning. Associated symptoms include arthralgias. Pertinent negatives include no anorexia, change in bowel habit, chest pain, chills, coughing, diaphoresis, fever, neck pain, rash, sore throat, swollen glands, vertigo or vomiting.     Review of Systems   Constitutional:  Negative for activity change, appetite change, chills, diaphoresis, fever and unexpected weight change.   HENT:  Negative for drooling, ear discharge, ear pain, facial swelling, nosebleeds, sore throat, trouble swallowing, voice change and goiter.    Eyes:  Negative for photophobia, pain, discharge, redness and visual disturbance.   Respiratory:  Negative for apnea, cough, choking, chest tightness, shortness of breath, wheezing and stridor.    Cardiovascular:  Negative for chest pain, palpitations and leg swelling.   Gastrointestinal:  Negative for abdominal distention, anorexia, change in bowel habit, diarrhea, rectal pain, vomiting and fecal incontinence.   Endocrine: Negative for cold intolerance, heat intolerance, polydipsia, polyphagia and polyuria.   Genitourinary:  Negative for bladder incontinence, dysuria, flank pain, frequency and hot flashes.   Musculoskeletal:  Positive for arthralgias, back pain and leg pain. Negative for neck pain.   Integumentary:  Negative for color change, pallor and rash.   Allergic/Immunologic: Negative for immunocompromised state.   Neurological:  Negative for dizziness, vertigo, seizures, syncope, facial asymmetry, speech difficulty, light-headedness, memory loss and coordination difficulties.   Hematological:  Negative for adenopathy. Does not bruise/bleed easily.   Psychiatric/Behavioral:  Negative for agitation, behavioral problems, confusion, decreased concentration,  dysphoric mood, hallucinations, self-injury and suicidal ideas. The patient is not nervous/anxious and is not hyperactive.            Past Medical History:   Diagnosis Date    Benign essential HTN     Carpal tunnel syndrome, bilateral upper limbs     Chronic low back pain     Degeneration, intervertebral disc, lumbosacral     GERD (gastroesophageal reflux disease)     Hypercholesterolemia     Insomnia     Iron deficiency anemia     Localized swelling, mass and lump, right lower limb     Lower extremity edema     Lumbar spondylosis     Prediabetes     Restless leg     Slow transit constipation     Spinal stenosis, lumbar     Vitamin D deficiency      Past Surgical History:   Procedure Laterality Date    CATARACT EXTRACTION Left 09/10/2020    CATARACT EXTRACTION Right 10/01/2020    Dr. Stephen Fox    CHOLECYSTECTOMY  1990    COSMETIC SURGERY      eyelid    EPIDURAL STEROID INJECTION INTO LUMBAR SPINE N/A 07/21/2022    Procedure: Injection-steroid-epidural-lumbar  L3-L4  CHRIS  NO SEDATION;  Surgeon: Esther Delgado MD;  Location: [facility];  Service: Pain Management;  Laterality: N/A;  STATES HAD VAC  WILL BRING CARD    EPIDURAL STEROID INJECTION INTO LUMBAR SPINE Bilateral 03/16/2023    Procedure: L3-4 CHRIS;  Surgeon: Esther Delgado MD;  Location: [facility];  Service: Pain Management;  Laterality: Bilateral;  NO ANESTHESIA    EPIDURAL STEROID INJECTION INTO LUMBAR SPINE Bilateral 11/21/2023    Procedure: L3-4 CHRIS;  Surgeon: Esther Delgado MD;  Location: [facility];  Service: Pain Management;  Laterality: Bilateral;  no sedation    EPIDURAL STEROID INJECTION INTO LUMBAR SPINE Bilateral 4/4/2024    Procedure: L5-S1 CHRIS;  Surgeon: Esther Delgado MD;  Location: [facility];  Service: Pain Management;  Laterality: Bilateral;    HIP ARTHROPLASTY Left 10/2015    repair of torn gluteus medius muscle via hip arthroscopy at [facility] Dr. Yoli Lui    HIP ARTHROPLASTY Right      repair of tendon at [facility]    LUMBAR DISCECTOMY  2019    SHOULDER ARTHROSCOPY W/ ROTATOR CUFF REPAIR  2005    SINUS SURGERY  2011    TUBAL LIGATION      VASCULAR SURGERY  12/15/2017    R [facility] Laser Ablation per Dr. Herman Bullard     Social History     Socioeconomic History    Marital status:     Number of children: 1   Tobacco Use    Smoking status: Never     Passive exposure: Never    Smokeless tobacco: Never   Substance and Sexual Activity    Alcohol use: Yes     Alcohol/week: 4.0 standard drinks of alcohol     Types: 4 Glasses of wine per week     Comment: per week    Drug use: Never    Sexual activity: Yes     Partners: Male     Birth control/protection: Post-menopausal     Social Determinants of Health     Financial Resource Strain: Low Risk  (2024)    Overall Financial Resource Strain (CARDIA)     Difficulty of Paying Living Expenses: Not hard at all   Food Insecurity: No Food Insecurity (2024)    Hunger Vital Sign     Worried About Running Out of Food in the Last Year: Never true     Ran Out of Food in the Last Year: Never true   Transportation Needs: No Transportation Needs (2024)    PRAPARE - Transportation     Lack of Transportation (Medical): No     Lack of Transportation (Non-Medical): No   Physical Activity: Sufficiently Active (2024)    Exercise Vital Sign     Days of Exercise per Week: 7 days     Minutes of Exercise per Session: 150+ min   Stress: No Stress Concern Present (2024)    [employer] - Occupational Stress Questionnaire     Feeling of Stress : Not at all   Housing Stability: Unknown (2024)    Housing Stability Vital Sign     Unable to Pay for Housing in the Last Year: No     Family History   Problem Relation Name Age of Onset    Hypertension Mother Mom             Coronary artery disease Father Dad     Diabetes Father Dad             Heart disease Father Dad              "Pulmonary embolism Brother      No Known Problems Son       Review of patient's allergies indicates:   Allergen Reactions    Ace inhibitors Other (See Comments)        Objective:  Vitals:    05/01/24 1016 05/01/24 1017   Resp: 18    SpO2: 99%    Weight: 67.1 kg (148 lb)    Height: 5' 1" (1.549 m)    PainSc:   3   3         Physical Exam  Vitals and nursing note reviewed. Exam conducted with a chaperone present.   Constitutional:       General: She is awake. She is not in acute distress.     Appearance: Normal appearance. She is not ill-appearing, toxic-appearing or diaphoretic.   HENT:      Head: Normocephalic and atraumatic.      Nose: Nose normal.      Mouth/Throat:      Mouth: Mucous membranes are moist.      Pharynx: Oropharynx is clear.   Eyes:      Conjunctiva/sclera: Conjunctivae normal.      Pupils: Pupils are equal, round, and reactive to light.   Cardiovascular:      Rate and Rhythm: Normal rate.   Pulmonary:      Effort: Pulmonary effort is normal. No respiratory distress.   Abdominal:      Palpations: Abdomen is soft.      Tenderness: There is no guarding.   Musculoskeletal:         General: Normal range of motion.      Cervical back: Normal range of motion and neck supple. No rigidity.   Skin:     General: Skin is warm and dry.      Coloration: Skin is not jaundiced or pale.   Neurological:      General: No focal deficit present.      Mental Status: She is alert and oriented to person, place, and time. Mental status is at baseline.      Cranial Nerves: No cranial nerve deficit (II-XII).   Psychiatric:         Mood and Affect: Mood normal.         Behavior: Behavior normal. Behavior is cooperative.         Thought Content: Thought content normal.           FL Fluoro for Pain Management  See OP Notes for results.     IMPRESSION: See OP Notes for results.     This procedure was auto-finalized by: Virtual Radiologist       Lab Visit on 04/19/2024   Component Date Value Ref Range Status    Sodium 04/19/2024 " 143  136 - 145 mmol/L Final    Potassium 04/19/2024 4.5  3.5 - 5.1 mmol/L Final    Chloride 04/19/2024 112 (H)  98 - 107 mmol/L Final    CO2 04/19/2024 27  21 - 32 mmol/L Final    Anion Gap 04/19/2024 9  7 - 16 mmol/L Final    Glucose 04/19/2024 101  74 - 106 mg/dL Final    BUN 04/19/2024 19 (H)  7 - 18 mg/dL Final    Creatinine 04/19/2024 0.78  0.55 - 1.02 mg/dL Final    BUN/Creatinine Ratio 04/19/2024 24 (H)  6 - 20 Final    Calcium 04/19/2024 9.3  8.5 - 10.1 mg/dL Final    Total Protein 04/19/2024 7.1  6.4 - 8.2 g/dL Final    Albumin 04/19/2024 3.6  3.5 - 5.0 g/dL Final    Globulin 04/19/2024 3.5  2.0 - 4.0 g/dL Final    A/G Ratio 04/19/2024 1.0   Final    Bilirubin, Total 04/19/2024 0.5  >0.0 - 1.2 mg/dL Final    Alk Phos 04/19/2024 67  55 - 142 U/L Final    ALT 04/19/2024 26  13 - 56 U/L Final    AST 04/19/2024 20  15 - 37 U/L Final    eGFR 04/19/2024 81  >=60 mL/min/1.73m2 Final    Triglycerides 04/19/2024 45  35 - 150 mg/dL Final    Cholesterol 04/19/2024 221 (H)  0 - 200 mg/dL Final    HDL Cholesterol 04/19/2024 85 (H)  40 - 60 mg/dL Final    Cholesterol/HDL Ratio (Risk Factor) 04/19/2024 2.6   Final    Non-HDL 04/19/2024 136  mg/dL Final    LDL Calculated 04/19/2024 127  mg/dL Final    LDL/HDL 04/19/2024 1.5   Final    VLDL 04/19/2024 9  mg/dL Final    Hemoglobin A1C 04/19/2024 5.3  4.5 - 6.6 % Final    Estimated Average Glucose 04/19/2024 105  mg/dL Final    WBC 04/19/2024 8.03  4.50 - 11.00 K/uL Final    RBC 04/19/2024 3.66 (L)  4.20 - 5.40 M/uL Final    Hemoglobin 04/19/2024 11.2 (L)  12.0 - 16.0 g/dL Final    Hematocrit 04/19/2024 36.1 (L)  38.0 - 47.0 % Final    MCV 04/19/2024 98.6 (H)  80.0 - 96.0 fL Final    MCH 04/19/2024 30.6  27.0 - 31.0 pg Final    MCHC 04/19/2024 31.0 (L)  32.0 - 36.0 g/dL Final    RDW 04/19/2024 13.6  11.5 - 14.5 % Final    Platelet Count 04/19/2024 263  150 - 400 K/uL Final    MPV 04/19/2024 10.7  9.4 - 12.4 fL Final    Neutrophils % 04/19/2024 53.9  53.0 - 65.0 % Final     Lymphocytes % 04/19/2024 34.5  27.0 - 41.0 % Final    Monocytes % 04/19/2024 8.6 (H)  2.0 - 6.0 % Final    Eosinophils % 04/19/2024 2.2  1.0 - 4.0 % Final    Basophils % 04/19/2024 0.6  0.0 - 1.0 % Final    Immature Granulocytes % 04/19/2024 0.2  0.0 - 0.4 % Final    nRBC, Auto 04/19/2024 0.0  <=0.0 % Final    Neutrophils, Abs 04/19/2024 4.32  1.80 - 7.70 K/uL Final    Lymphocytes, Absolute 04/19/2024 2.77  1.00 - 4.80 K/uL Final    Monocytes, Absolute 04/19/2024 0.69  0.00 - 0.80 K/uL Final    Eosinophils, Absolute 04/19/2024 0.18  0.00 - 0.50 K/uL Final    Basophils, Absolute 04/19/2024 0.05  0.00 - 0.20 K/uL Final    Immature Granulocytes, Absolute 04/19/2024 0.02  0.00 - 0.04 K/uL Final    nRBC, Absolute 04/19/2024 0.00  <=0.00 x10e3/uL Final    Diff Type 04/19/2024 Auto   Final   Office Visit on 01/31/2024   Component Date Value Ref Range Status    Candida Species 01/31/2024 Negative  Negative, Invalid Final    Gardnerella 01/31/2024 Negative  Negative, Invalid Final    Trichomonas 01/31/2024 Negative  Negative, Invalid Final   Procedure visit on 01/25/2024   Component Date Value Ref Range Status    Case Report 01/25/2024    Final                    Value:Surgical Pathology                                Case: [id number]                                   Authorizing Provider:  Emiliano Portillo MD           Collected:           01/25/2024 10:47 AM          Ordering Location:     [facility] Received:            01/25/2024 01:06 PM                                 - Obstetrics And                                                                                    Gynecology                                                                   Pathologist:           Rj Tan MD                                                           Specimen:    Endometrium                                                                                Final Diagnosis 01/25/2024    Final                    Value:This  result contains rich text formatting which cannot be displayed here.    Gross Description 01/25/2024    Final                    Value:This result contains rich text formatting which cannot be displayed here.    Microscopic Description 01/25/2024    Final                    Value:This result contains rich text formatting which cannot be displayed here.    Clinical Information 01/25/2024    Final                    Value:This result contains rich text formatting which cannot be displayed here.    Laboratory Notes 01/25/2024    Final                    Value:This result contains rich text formatting which cannot be displayed here.   Office Visit on 01/11/2024   Component Date Value Ref Range Status    Candida Species 01/11/2024 Negative  Negative, Invalid Final    Gardnerella 01/11/2024 Positive (A)  Negative, Invalid Final    Trichomonas 01/11/2024 Negative  Negative, Invalid Final   Hospital Outpatient Visit on 11/22/2023   Component Date Value Ref Range Status    POC Glucose 11/22/2023 134 (H)  70 - 105 mg/dL Final    Case Report 11/22/2023    Final                    Value:Surgical Pathology                                Case: [id number]                                   Authorizing Provider:  Placido Koch MD     Collected:           11/22/2023 04:07 PM          Ordering Location:     [facility] -         Received:            11/24/2023 08:53 AM                                 Endoscopy                                                                    Pathologist:           Breezy Ramirez MD                                                      Specimen:    Intestine Large, Sigmoid, a. sigmoid colon polyp                                           Final Diagnosis 11/22/2023    Final                    Value:This result contains rich text formatting which cannot be displayed here.    Gross Description 11/22/2023    Final                    Value:This result contains rich text formatting which  cannot be displayed here.    Microscopic Description 11/22/2023    Final                    Value:This result contains rich text formatting which cannot be displayed here.    Laboratory Notes 11/22/2023    Final                    Value:This result contains rich text formatting which cannot be displayed here.         No orders of the defined types were placed in this encounter.      Requested Prescriptions      No prescriptions requested or ordered in this encounter       Assessment:     1. Lumbosacral radiculopathy    2. Postlaminectomy syndrome of lumbar region           MRI right hip [facility] March 14, 2024  No acute bony abnormality  Evidence of intramuscular partial tear of the gluteus medius tendon on the right just superior to the greater trochanter level    MRI lumbar spine [facility] January 18, 2024  Vertebral bodies are normal in height if you with previous lumbar fusion at L4-5 appearing unchanged. There is mild spondylolisthesis at this and.   Multiple level degenerative changes      Plan:    Not using narcotics from our office    History of TLIF L4-5 Dr. Clark, October 2019    Follows orthopedics  Dr Elton Kent at [facility] in [facility]    She had L4/5 CHRIS # 1, July 21, 2023, she states she had 100% relief after procedure, his procedure did help improve her level of function    She had lumbar L3/4 CHRIS # 2, November 21, 2023 she states she had 95% relief after procedure, the procedure did help improve her level function      Follow-up after lumbar L5/S1 CHRIS # 1 April 4, 2024  She had 85% relief after procedure, procedure did help improve her level function    Procedure notes/fluoro images reviewed    She states that this point she would like to continue conservative management she is doing quite well    Continue home exercise program as directed    Follow-up as needed, patient request    Dr. Delgado April 2025    Bring original prescription medication bottles/container/box  with labels to each visit

## 2024-05-01 ENCOUNTER — OFFICE VISIT (OUTPATIENT)
Dept: PAIN MEDICINE | Facility: CLINIC | Age: 74
End: 2024-05-01
Payer: MEDICARE

## 2024-05-01 VITALS — RESPIRATION RATE: 18 BRPM | OXYGEN SATURATION: 99 % | WEIGHT: 148 LBS | BODY MASS INDEX: 27.94 KG/M2 | HEIGHT: 61 IN

## 2024-05-01 DIAGNOSIS — M54.17 LUMBOSACRAL RADICULOPATHY: Primary | Chronic | ICD-10-CM

## 2024-05-01 DIAGNOSIS — M96.1 POSTLAMINECTOMY SYNDROME OF LUMBAR REGION: Chronic | ICD-10-CM

## 2024-05-01 PROCEDURE — 99214 OFFICE O/P EST MOD 30 MIN: CPT | Mod: PBBFAC | Performed by: PHYSICIAN ASSISTANT

## 2024-05-01 PROCEDURE — 99213 OFFICE O/P EST LOW 20 MIN: CPT | Mod: S$PBB,,, | Performed by: PHYSICIAN ASSISTANT

## 2024-05-20 ENCOUNTER — OFFICE VISIT (OUTPATIENT)
Dept: SPINE | Facility: CLINIC | Age: 74
End: 2024-05-20
Payer: MEDICARE

## 2024-05-20 DIAGNOSIS — M51.36 DDD (DEGENERATIVE DISC DISEASE), LUMBAR: Primary | ICD-10-CM

## 2024-05-20 DIAGNOSIS — M54.16 LUMBAR RADICULOPATHY: ICD-10-CM

## 2024-05-20 DIAGNOSIS — M48.062 LUMBAR STENOSIS WITH NEUROGENIC CLAUDICATION: ICD-10-CM

## 2024-05-20 PROCEDURE — 99211 OFF/OP EST MAY X REQ PHY/QHP: CPT | Mod: PBBFAC | Performed by: ORTHOPAEDIC SURGERY

## 2024-05-20 PROCEDURE — 99214 OFFICE O/P EST MOD 30 MIN: CPT | Mod: S$PBB,,, | Performed by: ORTHOPAEDIC SURGERY

## 2024-05-20 NOTE — PROGRESS NOTES
MDM/time:  Greater than 30 minutes spent on this encounter including 10 minutes reviewing imaging and notes, 15 minutes with the patient, 5 minutes documentation    ASSESSMENT:  73 y.o. female with lumbar spondylosis and radiculopathy, status post L4-5 TLIF October 2019, now with back pain around the thoracolumbar junction    PLAN:  She wants to hold off on surgery until the fall/winter.  Follow-up in 4 months.  She would need a new MRI prior to surgery    HPI:  73 y.o. female here for repeat evaluation of lumbar spondylosis and back pain.  Reports that lately her back pain and leg pain have been getting worse.  She was getting injections with Dr. Delgado and was getting several months of relief.  She had an injection in November that started to wear off in January.  She had right hip pain and was diagnosed with a gluteus minimus tear which was fixed at [facility] in [place] with a hip scope on 09/09/2022.  Status post L4-5 TLIF October 2019. Reports that lately she has had worsening back pain as well as fatigue in her legs with prolonged standing walking and pain into the bilateral hips and buttocks.  Denies any recent injuries.      IMAGING:  X-rays lumbar spine reviewed show:  On the AP there is normal coronal alignment.  There are 5 non-rib-bearing lumbar vertebrae.  On the lateral there is maintained lumbar lordosis.  She is status post L4-5 laminectomy and fusion with interbody.  No signs of hardware loosening or failure.    MRI lumbar spine 05/19/2022 reviewed shows:  At L3-4 there is severe central bilateral lateral recess stenosis.  Moderate bilateral foraminal stenosis  At L4-5 there is prior TLIF with satisfactory decompression  At L5-S1 there is right paracentral disc protrusion with moderate right lateral recess stenosis and moderate bilateral foraminal stenosis, consistent with prior MRI    Past Medical History:   Diagnosis Date    Benign essential HTN     Carpal tunnel syndrome, bilateral upper  limbs     Chronic low back pain     Degeneration, intervertebral disc, lumbosacral     GERD (gastroesophageal reflux disease)     Hypercholesterolemia     Insomnia     Iron deficiency anemia     Localized swelling, mass and lump, right lower limb     Lower extremity edema     Lumbar spondylosis     Prediabetes     Restless leg     Slow transit constipation     Spinal stenosis, lumbar     Vitamin D deficiency      Past Surgical History:   Procedure Laterality Date    CATARACT EXTRACTION Left 09/10/2020    CATARACT EXTRACTION Right 10/01/2020    Dr. Stephen Fox    CHOLECYSTECTOMY  1990    COSMETIC SURGERY      eyelid    EPIDURAL STEROID INJECTION INTO LUMBAR SPINE N/A 07/21/2022    Procedure: Injection-steroid-epidural-lumbar  L3-L4  CHRIS  NO SEDATION;  Surgeon: Esther Delgado MD;  Location: [facility];  Service: Pain Management;  Laterality: N/A;  STATES HAD VAC  WILL BRING CARD    EPIDURAL STEROID INJECTION INTO LUMBAR SPINE Bilateral 03/16/2023    Procedure: L3-4 CHRIS;  Surgeon: Esther Delgado MD;  Location: [facility];  Service: Pain Management;  Laterality: Bilateral;  NO ANESTHESIA    EPIDURAL STEROID INJECTION INTO LUMBAR SPINE Bilateral 11/21/2023    Procedure: L3-4 CHRIS;  Surgeon: Esther Delgado MD;  Location: [facility];  Service: Pain Management;  Laterality: Bilateral;  no sedation    EPIDURAL STEROID INJECTION INTO LUMBAR SPINE Bilateral 4/4/2024    Procedure: L5-S1 CHRIS;  Surgeon: Esther Delgado MD;  Location: [facility];  Service: Pain Management;  Laterality: Bilateral;    HIP ARTHROPLASTY Left 10/2015    repair of torn gluteus medius muscle via hip arthroscopy at [facility] Dr. Yoli Lui    HIP ARTHROPLASTY Right 2023    repair of tendon at [facility]    LUMBAR DISCECTOMY  2019    SHOULDER ARTHROSCOPY W/ ROTATOR CUFF REPAIR  2005    SINUS SURGERY  02/2011    TUBAL LIGATION      VASCULAR SURGERY  12/15/2017    R [facility] Laser Ablation per   Herman Bullard     Social History     Tobacco Use    Smoking status: Never     Passive exposure: Never    Smokeless tobacco: Never   Substance Use Topics    Alcohol use: Yes     Alcohol/week: 4.0 standard drinks of alcohol     Types: 4 Glasses of wine per week     Comment: per week    Drug use: Never      Current Outpatient Medications   Medication Instructions    aspirin 81 mg, Oral, Daily    esomeprazole (NEXIUM) 20 mg, Oral, Before breakfast    ferrous sulfate (FEOSOL) 325 mg, Oral, With breakfast    fluticasone propionate (FLONASE) 100 mcg, Each Nostril, Daily    losartan (COZAAR) 25 mg, Oral, Daily    meloxicam (MOBIC) 7.5 mg, Oral, Daily, Take with food    metFORMIN (GLUCOPHAGE) 500 mg, Oral, 3 times daily    multivitamin (THERAGRAN) per tablet 1 tablet, Oral, Daily    pravastatin (PRAVACHOL) 10 mg, Oral, Nightly    pyridoxine (vitamin B6) (B-6) 50 mg, Oral, Daily    vitamin D (VITAMIN D3) 1,000 Units, Oral, Daily    zinc gluconate 50 mg, Oral, Daily        EXAM:  Constitutional  General Appearance:  There is no height or weight on file to calculate BMI., NAD  Psychiatric   Orientation: Oriented to time, oriented to place, oriented to person  Mood and Affect: Active and alert, normal mood, normal affect  Gait and Station   Appearance:  Normal gait, normal tandem gait, able to walk on toes, able to walk on heels  Healed incision  5/5 strength  Sensation intact  2+ pulses

## 2024-05-23 ENCOUNTER — OFFICE VISIT (OUTPATIENT)
Dept: FAMILY MEDICINE | Facility: CLINIC | Age: 74
End: 2024-05-23
Payer: MEDICARE

## 2024-05-23 VITALS
RESPIRATION RATE: 12 BRPM | WEIGHT: 144 LBS | OXYGEN SATURATION: 95 % | SYSTOLIC BLOOD PRESSURE: 135 MMHG | BODY MASS INDEX: 27.19 KG/M2 | HEIGHT: 61 IN | DIASTOLIC BLOOD PRESSURE: 74 MMHG | HEART RATE: 76 BPM

## 2024-05-23 DIAGNOSIS — R42 DIZZINESS: Primary | ICD-10-CM

## 2024-05-23 DIAGNOSIS — T22.011A BURN OF RIGHT FOREARM, UNSPECIFIED BURN DEGREE, INITIAL ENCOUNTER: ICD-10-CM

## 2024-05-23 DIAGNOSIS — R11.0 NAUSEA: ICD-10-CM

## 2024-05-23 DIAGNOSIS — I10 BENIGN ESSENTIAL HTN: ICD-10-CM

## 2024-05-23 DIAGNOSIS — D50.9 IRON DEFICIENCY ANEMIA, UNSPECIFIED IRON DEFICIENCY ANEMIA TYPE: ICD-10-CM

## 2024-05-23 DIAGNOSIS — H93.13 TINNITUS OF BOTH EARS: ICD-10-CM

## 2024-05-23 PROCEDURE — 85025 COMPLETE CBC W/AUTO DIFF WBC: CPT | Mod: ,,, | Performed by: CLINICAL MEDICAL LABORATORY

## 2024-05-23 PROCEDURE — 99213 OFFICE O/P EST LOW 20 MIN: CPT | Mod: ,,, | Performed by: NURSE PRACTITIONER

## 2024-05-23 RX ORDER — MECLIZINE HYDROCHLORIDE 25 MG/1
25 TABLET ORAL EVERY 8 HOURS PRN
Qty: 30 TABLET | Refills: 1 | Status: SHIPPED | OUTPATIENT
Start: 2024-05-23

## 2024-05-23 RX ORDER — SILVER SULFADIAZINE 10 G/1000G
CREAM TOPICAL DAILY
Qty: 50 G | Refills: 0 | Status: SHIPPED | OUTPATIENT
Start: 2024-05-23 | End: 2024-05-30

## 2024-05-23 RX ORDER — ONDANSETRON 8 MG/1
8 TABLET, ORALLY DISINTEGRATING ORAL EVERY 12 HOURS PRN
Qty: 10 TABLET | Refills: 0 | Status: SHIPPED | OUTPATIENT
Start: 2024-05-23
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
## 2024-05-23 NOTE — PATIENT INSTRUCTIONS
Zofran and meclizine as prescribed     Contact clinic if you decide to proceed with vestibular therapy    Lab obtained in clinic today, we will notify you of results and any necessary changes to plan of care     Silvadene cream as prescribed, apply to burn on right forearm

## 2024-05-24 LAB
BASOPHILS # BLD AUTO: 0.08 K/UL (ref 0–0.2)
BASOPHILS NFR BLD AUTO: 0.9 % (ref 0–1)
DIFFERENTIAL METHOD BLD: ABNORMAL
EOSINOPHIL # BLD AUTO: 0.13 K/UL (ref 0–0.5)
EOSINOPHIL NFR BLD AUTO: 1.4 % (ref 1–4)
ERYTHROCYTE [DISTWIDTH] IN BLOOD BY AUTOMATED COUNT: 13.8 % (ref 11.5–14.5)
HCT VFR BLD AUTO: 35.2 % (ref 38–47)
HGB BLD-MCNC: 10.8 G/DL (ref 12–16)
IMM GRANULOCYTES # BLD AUTO: 0.06 K/UL (ref 0–0.04)
IMM GRANULOCYTES NFR BLD: 0.7 % (ref 0–0.4)
LYMPHOCYTES # BLD AUTO: 2.75 K/UL (ref 1–4.8)
LYMPHOCYTES NFR BLD AUTO: 30.3 % (ref 27–41)
MCH RBC QN AUTO: 30.7 PG (ref 27–31)
MCHC RBC AUTO-ENTMCNC: 30.7 G/DL (ref 32–36)
MCV RBC AUTO: 100 FL (ref 80–96)
MONOCYTES # BLD AUTO: 0.85 K/UL (ref 0–0.8)
MONOCYTES NFR BLD AUTO: 9.4 % (ref 2–6)
MPC BLD CALC-MCNC: 11.5 FL (ref 9.4–12.4)
NEUTROPHILS # BLD AUTO: 5.22 K/UL (ref 1.8–7.7)
NEUTROPHILS NFR BLD AUTO: 57.3 % (ref 53–65)
NRBC # BLD AUTO: 0 X10E3/UL
NRBC, AUTO (.00): 0 %
PLATELET # BLD AUTO: 273 K/UL (ref 150–400)
RBC # BLD AUTO: 3.52 M/UL (ref 4.2–5.4)
WBC # BLD AUTO: 9.09 K/UL (ref 4.5–11)

## 2024-05-28 ENCOUNTER — PATIENT MESSAGE (OUTPATIENT)
Dept: FAMILY MEDICINE | Facility: CLINIC | Age: 74
End: 2024-05-28
Payer: MEDICARE

## 2024-05-29 ENCOUNTER — PATIENT MESSAGE (OUTPATIENT)
Dept: FAMILY MEDICINE | Facility: CLINIC | Age: 74
End: 2024-05-29
Payer: MEDICARE

## 2024-05-29 DIAGNOSIS — D64.9 LOW HEMOGLOBIN AND LOW HEMATOCRIT: Primary | ICD-10-CM

## 2024-05-30 DIAGNOSIS — D50.9 IRON DEFICIENCY ANEMIA, UNSPECIFIED IRON DEFICIENCY ANEMIA TYPE: Primary | ICD-10-CM

## 2024-06-03 ENCOUNTER — TELEPHONE (OUTPATIENT)
Dept: FAMILY MEDICINE | Facility: CLINIC | Age: 74
End: 2024-06-03
Payer: MEDICARE

## 2024-06-03 DIAGNOSIS — D50.9 IRON DEFICIENCY ANEMIA, UNSPECIFIED IRON DEFICIENCY ANEMIA TYPE: Primary | ICD-10-CM

## 2024-06-04 ENCOUNTER — PATIENT MESSAGE (OUTPATIENT)
Dept: FAMILY MEDICINE | Facility: CLINIC | Age: 74
End: 2024-06-04
Payer: MEDICARE

## 2024-06-05 ENCOUNTER — PATIENT MESSAGE (OUTPATIENT)
Dept: FAMILY MEDICINE | Facility: CLINIC | Age: 74
End: 2024-06-05
Payer: MEDICARE

## 2024-06-06 ENCOUNTER — LAB VISIT (OUTPATIENT)
Dept: LAB | Facility: HOSPITAL | Age: 74
End: 2024-06-06
Attending: NURSE PRACTITIONER
Payer: MEDICARE

## 2024-06-06 DIAGNOSIS — D64.9 LOW HEMOGLOBIN AND LOW HEMATOCRIT: ICD-10-CM

## 2024-06-06 DIAGNOSIS — M48.061 SPINAL STENOSIS OF LUMBAR REGION, UNSPECIFIED WHETHER NEUROGENIC CLAUDICATION PRESENT: Primary | ICD-10-CM

## 2024-06-06 DIAGNOSIS — D50.9 IRON DEFICIENCY ANEMIA, UNSPECIFIED IRON DEFICIENCY ANEMIA TYPE: ICD-10-CM

## 2024-06-06 DIAGNOSIS — M54.16 LUMBAR RADICULOPATHY: ICD-10-CM

## 2024-06-06 DIAGNOSIS — Z98.1 HISTORY OF LUMBAR SPINAL FUSION: ICD-10-CM

## 2024-06-06 LAB
BASOPHILS # BLD AUTO: 0.04 K/UL (ref 0–0.2)
BASOPHILS NFR BLD AUTO: 0.7 % (ref 0–1)
DIFFERENTIAL METHOD BLD: ABNORMAL
EOSINOPHIL # BLD AUTO: 0.22 K/UL (ref 0–0.5)
EOSINOPHIL NFR BLD AUTO: 3.6 % (ref 1–4)
ERYTHROCYTE [DISTWIDTH] IN BLOOD BY AUTOMATED COUNT: 13.2 % (ref 11.5–14.5)
FERRITIN SERPL-MCNC: 69 NG/ML (ref 8–252)
FOLATE SERPL-MCNC: >20 NG/ML (ref 3.1–17.5)
HCT VFR BLD AUTO: 34.7 % (ref 38–47)
HGB BLD-MCNC: 10.9 G/DL (ref 12–16)
IMM GRANULOCYTES # BLD AUTO: 0.01 K/UL (ref 0–0.04)
IMM GRANULOCYTES NFR BLD: 0.2 % (ref 0–0.4)
IRON SATN MFR SERPL: 22 % (ref 14–50)
IRON SERPL-MCNC: 68 ΜG/DL (ref 50–170)
LYMPHOCYTES # BLD AUTO: 2.38 K/UL (ref 1–4.8)
LYMPHOCYTES NFR BLD AUTO: 39.1 % (ref 27–41)
MCH RBC QN AUTO: 30.6 PG (ref 27–31)
MCHC RBC AUTO-ENTMCNC: 31.4 G/DL (ref 32–36)
MCV RBC AUTO: 97.5 FL (ref 80–96)
MONOCYTES # BLD AUTO: 0.62 K/UL (ref 0–0.8)
MONOCYTES NFR BLD AUTO: 10.2 % (ref 2–6)
MPC BLD CALC-MCNC: 10.4 FL (ref 9.4–12.4)
NEUTROPHILS # BLD AUTO: 2.81 K/UL (ref 1.8–7.7)
NEUTROPHILS NFR BLD AUTO: 46.2 % (ref 53–65)
NRBC # BLD AUTO: 0 X10E3/UL
NRBC, AUTO (.00): 0 %
PLATELET # BLD AUTO: 239 K/UL (ref 150–400)
RBC # BLD AUTO: 3.56 M/UL (ref 4.2–5.4)
TIBC SERPL-MCNC: 312 ΜG/DL (ref 250–450)
VIT B12 SERPL-MCNC: 546 PG/ML (ref 193–986)
WBC # BLD AUTO: 6.08 K/UL (ref 4.5–11)

## 2024-06-06 PROCEDURE — 83540 ASSAY OF IRON: CPT

## 2024-06-06 PROCEDURE — 82728 ASSAY OF FERRITIN: CPT

## 2024-06-06 PROCEDURE — 82746 ASSAY OF FOLIC ACID SERUM: CPT | Mod: GA

## 2024-06-06 PROCEDURE — 85025 COMPLETE CBC W/AUTO DIFF WBC: CPT

## 2024-06-06 PROCEDURE — 82607 VITAMIN B-12: CPT | Mod: GA

## 2024-06-06 PROCEDURE — 36415 COLL VENOUS BLD VENIPUNCTURE: CPT

## 2024-06-06 RX ORDER — CELECOXIB 100 MG/1
100 CAPSULE ORAL 2 TIMES DAILY
Qty: 180 CAPSULE | Refills: 0 | Status: SHIPPED | OUTPATIENT
Start: 2024-06-06

## 2024-06-11 ENCOUNTER — PATIENT MESSAGE (OUTPATIENT)
Dept: FAMILY MEDICINE | Facility: CLINIC | Age: 74
End: 2024-06-11
Payer: MEDICARE

## 2024-07-09 DIAGNOSIS — Z71.89 COMPLEX CARE COORDINATION: ICD-10-CM

## 2024-07-15 ENCOUNTER — PATIENT MESSAGE (OUTPATIENT)
Dept: PAIN MEDICINE | Facility: CLINIC | Age: 74
End: 2024-07-15
Payer: MEDICARE

## 2024-07-23 PROBLEM — M54.16 LUMBAR RADICULOPATHY: Chronic | Status: ACTIVE | Noted: 2024-06-06

## 2024-07-23 PROBLEM — M48.061 SPINAL STENOSIS OF LUMBAR REGION: Chronic | Status: ACTIVE | Noted: 2024-06-06

## 2024-07-23 NOTE — PROGRESS NOTES
Subjective:         Patient ID: Lynda Parr is a 73 y.o. female.    Chief Complaint: Hip Pain      Pain  This is a chronic problem. The current episode started more than 1 year ago. The problem occurs daily. The problem has been unchanged. Associated symptoms include arthralgias. Pertinent negatives include no anorexia, change in bowel habit, chest pain, chills, coughing, diaphoresis, fever, neck pain, rash, sore throat, swollen glands, vertigo or vomiting.     Review of Systems   Constitutional:  Negative for activity change, appetite change, chills, diaphoresis, fever and unexpected weight change.   HENT:  Negative for drooling, ear discharge, ear pain, facial swelling, nosebleeds, sore throat, trouble swallowing, voice change and goiter.    Eyes:  Negative for photophobia, pain, discharge, redness and visual disturbance.   Respiratory:  Negative for apnea, cough, choking, chest tightness, shortness of breath, wheezing and stridor.    Cardiovascular:  Negative for chest pain, palpitations and leg swelling.   Gastrointestinal:  Negative for abdominal distention, anorexia, change in bowel habit, diarrhea, rectal pain, vomiting and fecal incontinence.   Endocrine: Negative for cold intolerance, heat intolerance, polydipsia, polyphagia and polyuria.   Genitourinary:  Negative for bladder incontinence, dysuria, flank pain, frequency and hot flashes.   Musculoskeletal:  Positive for arthralgias, back pain and leg pain. Negative for neck pain.   Integumentary:  Negative for color change, pallor and rash.   Allergic/Immunologic: Negative for immunocompromised state.   Neurological:  Negative for dizziness, vertigo, seizures, syncope, facial asymmetry, speech difficulty, light-headedness, memory loss and coordination difficulties.   Hematological:  Negative for adenopathy. Does not bruise/bleed easily.   Psychiatric/Behavioral:  Negative for agitation, behavioral problems, confusion, decreased concentration, dysphoric  mood, hallucinations, self-injury and suicidal ideas. The patient is not nervous/anxious and is not hyperactive.            Past Medical History:   Diagnosis Date    Benign essential HTN     Carpal tunnel syndrome, bilateral upper limbs     Chronic low back pain     Degeneration, intervertebral disc, lumbosacral     GERD (gastroesophageal reflux disease)     Hypercholesterolemia     Insomnia     Iron deficiency anemia     Localized swelling, mass and lump, right lower limb     Lower extremity edema     Lumbar spondylosis     Prediabetes     Restless leg     Slow transit constipation     Spinal stenosis, lumbar     Vitamin D deficiency      Past Surgical History:   Procedure Laterality Date    CATARACT EXTRACTION Left 09/10/2020    CATARACT EXTRACTION Right 10/01/2020    Dr. Stephen Fox    CHOLECYSTECTOMY  1990    COSMETIC SURGERY      eyelid    EPIDURAL STEROID INJECTION INTO LUMBAR SPINE N/A 07/21/2022    Procedure: Injection-steroid-epidural-lumbar  L3-L4  CHRIS  NO SEDATION;  Surgeon: Esther Delgado MD;  Location: [facility];  Service: Pain Management;  Laterality: N/A;  STATES HAD VAC  WILL BRING CARD    EPIDURAL STEROID INJECTION INTO LUMBAR SPINE Bilateral 03/16/2023    Procedure: L3-4 CHRIS;  Surgeon: Esther Delgado MD;  Location: [facility];  Service: Pain Management;  Laterality: Bilateral;  NO ANESTHESIA    EPIDURAL STEROID INJECTION INTO LUMBAR SPINE Bilateral 11/21/2023    Procedure: L3-4 CHRIS;  Surgeon: Esther Delgado MD;  Location: [facility];  Service: Pain Management;  Laterality: Bilateral;  no sedation    EPIDURAL STEROID INJECTION INTO LUMBAR SPINE Bilateral 4/4/2024    Procedure: L5-S1 CHRIS;  Surgeon: Esther Delgado MD;  Location: [facility];  Service: Pain Management;  Laterality: Bilateral;    HIP ARTHROPLASTY Left 10/2015    repair of torn gluteus medius muscle via hip arthroscopy at Dr. Yoli Stephens    HIP ARTHROPLASTY Right 2023     repair of tendon at [facility]    LUMBAR DISCECTOMY  2019    SHOULDER ARTHROSCOPY W/ ROTATOR CUFF REPAIR  2005    SINUS SURGERY  2011    TUBAL LIGATION      VASCULAR SURGERY  12/15/2017    [facility] Ablation per Dr. Herman Bullard     Social History     Socioeconomic History    Marital status:     Number of children: 1   Tobacco Use    Smoking status: Never     Passive exposure: Never    Smokeless tobacco: Never   Substance and Sexual Activity    Alcohol use: Yes     Alcohol/week: 4.0 standard drinks of alcohol     Types: 4 Glasses of wine per week     Comment: per week    Drug use: Never    Sexual activity: Yes     Partners: Male     Birth control/protection: Post-menopausal     Social Determinants of Health     Financial Resource Strain: Low Risk  (2024)    Overall Financial Resource Strain (CARDIA)     Difficulty of Paying Living Expenses: Not hard at all   Food Insecurity: No Food Insecurity (2024)    Hunger Vital Sign     Worried About Running Out of Food in the Last Year: Never true     Ran Out of Food in the Last Year: Never true   Transportation Needs: No Transportation Needs (2024)    PRAPARE - Transportation     Lack of Transportation (Medical): No     Lack of Transportation (Non-Medical): No   Physical Activity: Sufficiently Active (2024)    Exercise Vital Sign     Days of Exercise per Week: 7 days     Minutes of Exercise per Session: 150+ min   Stress: No Stress Concern Present (2024)    [place] [employer] - Occupational Stress Questionnaire     Feeling of Stress : Not at all   Housing Stability: Low Risk  (2024)    Housing Stability Vital Sign     Unable to Pay for Housing in the Last Year: No     Homeless in the Last Year: No     Family History   Problem Relation Name Age of Onset    Hypertension Mother Mom             Coronary artery disease Father Dad     Diabetes Father Dad             Heart disease Father Dad   "           Pulmonary embolism Brother      No Known Problems Son       Review of patient's allergies indicates:   Allergen Reactions    Ace inhibitors Other (See Comments)        Objective:  Vitals:    24 0807 24 0808   BP: (!) 159/59    Pulse: 66    Resp: 17    Weight: 68.5 kg (151 lb)    Height: 5' 1" (1.549 m)    PainSc: 0-No pain 0-No pain           Physical Exam  Vitals and nursing note reviewed. Exam conducted with a chaperone present.   Constitutional:       General: She is awake. She is not in acute distress.     Appearance: Normal appearance. She is not ill-appearing, toxic-appearing or diaphoretic.   HENT:      Head: Normocephalic and atraumatic.      Nose: Nose normal.      Mouth/Throat:      Mouth: Mucous membranes are moist.      Pharynx: Oropharynx is clear.   Eyes:      Conjunctiva/sclera: Conjunctivae normal.      Pupils: Pupils are equal, round, and reactive to light.   Cardiovascular:      Rate and Rhythm: Normal rate.   Pulmonary:      Effort: Pulmonary effort is normal. No respiratory distress.   Abdominal:      Palpations: Abdomen is soft.      Tenderness: There is no guarding.   Musculoskeletal:         General: Normal range of motion.      Cervical back: Normal range of motion and neck supple. No rigidity.   Skin:     General: Skin is warm and dry.      Coloration: Skin is not jaundiced or pale.   Neurological:      General: No focal deficit present.      Mental Status: She is alert and oriented to person, place, and time. Mental status is at baseline.      Cranial Nerves: No cranial nerve deficit (II-XII).   Psychiatric:         Mood and Affect: Mood normal.         Behavior: Behavior normal. Behavior is cooperative.         Thought Content: Thought content normal.           FL Fluoro for Pain Management  See OP Notes for results.     IMPRESSION: See OP Notes for results.     This procedure was auto-finalized by: Virtual Radiologist       Lab Visit on 2024 "   Component Date Value Ref Range Status    Vitamin B12 06/06/2024 546  193 - 986 pg/mL Final    Iron 06/06/2024 68  50 - 170 µg/dL Final    Iron Saturation 06/06/2024 22  14 - 50 % Final    TIBC 06/06/2024 312  250 - 450 µg/dL Final    Ferritin 06/06/2024 69  8 - 252 ng/mL Final    Folate 06/06/2024 >20.0 (H)  3.1 - 17.5 ng/mL Final    WBC 06/06/2024 6.08  4.50 - 11.00 K/uL Final    RBC 06/06/2024 3.56 (L)  4.20 - 5.40 M/uL Final    Hemoglobin 06/06/2024 10.9 (L)  12.0 - 16.0 g/dL Final    Hematocrit 06/06/2024 34.7 (L)  38.0 - 47.0 % Final    MCV 06/06/2024 97.5 (H)  80.0 - 96.0 fL Final    MCH 06/06/2024 30.6  27.0 - 31.0 pg Final    MCHC 06/06/2024 31.4 (L)  32.0 - 36.0 g/dL Final    RDW 06/06/2024 13.2  11.5 - 14.5 % Final    Platelet Count 06/06/2024 239  150 - 400 K/uL Final    MPV 06/06/2024 10.4  9.4 - 12.4 fL Final    Neutrophils % 06/06/2024 46.2 (L)  53.0 - 65.0 % Final    Lymphocytes % 06/06/2024 39.1  27.0 - 41.0 % Final    Monocytes % 06/06/2024 10.2 (H)  2.0 - 6.0 % Final    Eosinophils % 06/06/2024 3.6  1.0 - 4.0 % Final    Basophils % 06/06/2024 0.7  0.0 - 1.0 % Final    Immature Granulocytes % 06/06/2024 0.2  0.0 - 0.4 % Final    nRBC, Auto 06/06/2024 0.0  <=0.0 % Final    Neutrophils, Abs 06/06/2024 2.81  1.80 - 7.70 K/uL Final    Lymphocytes, Absolute 06/06/2024 2.38  1.00 - 4.80 K/uL Final    Monocytes, Absolute 06/06/2024 0.62  0.00 - 0.80 K/uL Final    Eosinophils, Absolute 06/06/2024 0.22  0.00 - 0.50 K/uL Final    Basophils, Absolute 06/06/2024 0.04  0.00 - 0.20 K/uL Final    Immature Granulocytes, Absolute 06/06/2024 0.01  0.00 - 0.04 K/uL Final    nRBC, Absolute 06/06/2024 0.00  <=0.00 x10e3/uL Final    Diff Type 06/06/2024 Auto   Final   Office Visit on 05/23/2024   Component Date Value Ref Range Status    WBC 05/23/2024 9.09  4.50 - 11.00 K/uL Final    RBC 05/23/2024 3.52 (L)  4.20 - 5.40 M/uL Final    Hemoglobin 05/23/2024 10.8 (L)  12.0 - 16.0 g/dL Final    Hematocrit 05/23/2024 35.2  (L)  38.0 - 47.0 % Final    MCV 05/23/2024 100.0 (H)  80.0 - 96.0 fL Final    MCH 05/23/2024 30.7  27.0 - 31.0 pg Final    MCHC 05/23/2024 30.7 (L)  32.0 - 36.0 g/dL Final    RDW 05/23/2024 13.8  11.5 - 14.5 % Final    Platelet Count 05/23/2024 273  150 - 400 K/uL Final    MPV 05/23/2024 11.5  9.4 - 12.4 fL Final    Neutrophils % 05/23/2024 57.3  53.0 - 65.0 % Final    Lymphocytes % 05/23/2024 30.3  27.0 - 41.0 % Final    Monocytes % 05/23/2024 9.4 (H)  2.0 - 6.0 % Final    Eosinophils % 05/23/2024 1.4  1.0 - 4.0 % Final    Basophils % 05/23/2024 0.9  0.0 - 1.0 % Final    Immature Granulocytes % 05/23/2024 0.7 (H)  0.0 - 0.4 % Final    nRBC, Auto 05/23/2024 0.0  <=0.0 % Final    Neutrophils, Abs 05/23/2024 5.22  1.80 - 7.70 K/uL Final    Lymphocytes, Absolute 05/23/2024 2.75  1.00 - 4.80 K/uL Final    Monocytes, Absolute 05/23/2024 0.85 (H)  0.00 - 0.80 K/uL Final    Eosinophils, Absolute 05/23/2024 0.13  0.00 - 0.50 K/uL Final    Basophils, Absolute 05/23/2024 0.08  0.00 - 0.20 K/uL Final    Immature Granulocytes, Absolute 05/23/2024 0.06 (H)  0.00 - 0.04 K/uL Final    nRBC, Absolute 05/23/2024 0.00  <=0.00 x10e3/uL Final    Diff Type 05/23/2024 Auto   Final   Lab Visit on 04/19/2024   Component Date Value Ref Range Status    Sodium 04/19/2024 143  136 - 145 mmol/L Final    Potassium 04/19/2024 4.5  3.5 - 5.1 mmol/L Final    Chloride 04/19/2024 112 (H)  98 - 107 mmol/L Final    CO2 04/19/2024 27  21 - 32 mmol/L Final    Anion Gap 04/19/2024 9  7 - 16 mmol/L Final    Glucose 04/19/2024 101  74 - 106 mg/dL Final    BUN 04/19/2024 19 (H)  7 - 18 mg/dL Final    Creatinine 04/19/2024 0.78  0.55 - 1.02 mg/dL Final    BUN/Creatinine Ratio 04/19/2024 24 (H)  6 - 20 Final    Calcium 04/19/2024 9.3  8.5 - 10.1 mg/dL Final    Total Protein 04/19/2024 7.1  6.4 - 8.2 g/dL Final    Albumin 04/19/2024 3.6  3.5 - 5.0 g/dL Final    Globulin 04/19/2024 3.5  2.0 - 4.0 g/dL Final    A/G Ratio 04/19/2024 1.0   Final    Bilirubin, Total  04/19/2024 0.5  >0.0 - 1.2 mg/dL Final    Alk Phos 04/19/2024 67  55 - 142 U/L Final    ALT 04/19/2024 26  13 - 56 U/L Final    AST 04/19/2024 20  15 - 37 U/L Final    eGFR 04/19/2024 81  >=60 mL/min/1.73m2 Final    Triglycerides 04/19/2024 45  35 - 150 mg/dL Final    Cholesterol 04/19/2024 221 (H)  0 - 200 mg/dL Final    HDL Cholesterol 04/19/2024 85 (H)  40 - 60 mg/dL Final    Cholesterol/HDL Ratio (Risk Factor) 04/19/2024 2.6   Final    Non-HDL 04/19/2024 136  mg/dL Final    LDL Calculated 04/19/2024 127  mg/dL Final    LDL/HDL 04/19/2024 1.5   Final    VLDL 04/19/2024 9  mg/dL Final    Hemoglobin A1C 04/19/2024 5.3  4.5 - 6.6 % Final    Estimated Average Glucose 04/19/2024 105  mg/dL Final    WBC 04/19/2024 8.03  4.50 - 11.00 K/uL Final    RBC 04/19/2024 3.66 (L)  4.20 - 5.40 M/uL Final    Hemoglobin 04/19/2024 11.2 (L)  12.0 - 16.0 g/dL Final    Hematocrit 04/19/2024 36.1 (L)  38.0 - 47.0 % Final    MCV 04/19/2024 98.6 (H)  80.0 - 96.0 fL Final    MCH 04/19/2024 30.6  27.0 - 31.0 pg Final    MCHC 04/19/2024 31.0 (L)  32.0 - 36.0 g/dL Final    RDW 04/19/2024 13.6  11.5 - 14.5 % Final    Platelet Count 04/19/2024 263  150 - 400 K/uL Final    MPV 04/19/2024 10.7  9.4 - 12.4 fL Final    Neutrophils % 04/19/2024 53.9  53.0 - 65.0 % Final    Lymphocytes % 04/19/2024 34.5  27.0 - 41.0 % Final    Monocytes % 04/19/2024 8.6 (H)  2.0 - 6.0 % Final    Eosinophils % 04/19/2024 2.2  1.0 - 4.0 % Final    Basophils % 04/19/2024 0.6  0.0 - 1.0 % Final    Immature Granulocytes % 04/19/2024 0.2  0.0 - 0.4 % Final    nRBC, Auto 04/19/2024 0.0  <=0.0 % Final    Neutrophils, Abs 04/19/2024 4.32  1.80 - 7.70 K/uL Final    Lymphocytes, Absolute 04/19/2024 2.77  1.00 - 4.80 K/uL Final    Monocytes, Absolute 04/19/2024 0.69  0.00 - 0.80 K/uL Final    Eosinophils, Absolute 04/19/2024 0.18  0.00 - 0.50 K/uL Final    Basophils, Absolute 04/19/2024 0.05  0.00 - 0.20 K/uL Final    Immature Granulocytes, Absolute 04/19/2024 0.02  0.00 - 0.04  K/uL Final    nRBC, Absolute 04/19/2024 0.00  <=0.00 x10e3/uL Final    Diff Type 04/19/2024 Auto   Final   Office Visit on 01/31/2024   Component Date Value Ref Range Status    Candida Species 01/31/2024 Negative  Negative, Invalid Final    Gardnerella 01/31/2024 Negative  Negative, Invalid Final    Trichomonas 01/31/2024 Negative  Negative, Invalid Final         No orders of the defined types were placed in this encounter.      Requested Prescriptions     Signed Prescriptions Disp Refills    methylPREDNISolone (MEDROL DOSEPACK) 4 mg tablet 21 tablet 0     Sig: use as directed       Assessment:     1. Lumbar radiculopathy    2. Spinal stenosis of lumbar region with neurogenic claudication             MRI right hip [facility] March 14, 2024  No acute bony abnormality  Evidence of intramuscular partial tear of the gluteus medius tendon on the right just superior to the greater trochanter level    MRI lumbar spine [facility] January 18, 2024  Vertebral bodies are normal in height if you with previous lumbar fusion at L4-5 appearing unchanged. There is mild spondylolisthesis at this and.   Multiple level degenerative changes      Plan:    Not using narcotics from our office    History of TLIF L4-5 Dr. Clark, October 2019    Follows orthopedics  Dr Elton Kent at [facility] in [facility]    She had L4/5 CHRIS # 1, July 21, 2023, she states she had 100% relief after procedure, his procedure did help improve her level of function    She had lumbar L3/4 CHRIS # 2, November 21, 2023 she states she had 95% relief after procedure, the procedure did help improve her level function    She had lumbar L5/S1 CHRIS # 1 April 4, 2024  She had 85% relief after procedure, procedure did help improve her level function      Complaint of lower back pain right buttock down the back of the right leg pain numbness and tingling S1 distribution worse at night radicular in nature     Denies loss of bowel or bladder function      She states she continues to be very active golfing 4 times a week    Declines gabapentin/amitriptyline due to sedation    After discussing options will proceed with     Toradol 30 mg IM, tolerated well     Medrol Dosepak as directed    Considering lumbar procedure if pain continues    Continue home exercise program as directed    Follow-up as needed, patient request    Dr. Delgado April 2025    Bring original prescription medication bottles/container/box with labels to each visit

## 2024-07-24 ENCOUNTER — OFFICE VISIT (OUTPATIENT)
Dept: FAMILY MEDICINE | Facility: CLINIC | Age: 74
End: 2024-07-24
Payer: MEDICARE

## 2024-07-24 VITALS
BODY MASS INDEX: 28.06 KG/M2 | DIASTOLIC BLOOD PRESSURE: 68 MMHG | WEIGHT: 148.63 LBS | RESPIRATION RATE: 12 BRPM | OXYGEN SATURATION: 100 % | HEART RATE: 73 BPM | SYSTOLIC BLOOD PRESSURE: 141 MMHG | HEIGHT: 61 IN

## 2024-07-24 DIAGNOSIS — R73.03 PREDIABETES: ICD-10-CM

## 2024-07-24 DIAGNOSIS — Z78.9 STATIN INTOLERANCE: ICD-10-CM

## 2024-07-24 DIAGNOSIS — E78.00 HYPERCHOLESTEREMIA: ICD-10-CM

## 2024-07-24 DIAGNOSIS — I10 BENIGN ESSENTIAL HTN: ICD-10-CM

## 2024-07-24 DIAGNOSIS — Z00.00 ENCOUNTER FOR SUBSEQUENT ANNUAL WELLNESS VISIT (AWV) IN MEDICARE PATIENT: Primary | ICD-10-CM

## 2024-07-24 PROCEDURE — G0439 PPPS, SUBSEQ VISIT: HCPCS | Mod: ,,, | Performed by: NURSE PRACTITIONER

## 2024-07-24 NOTE — PATIENT INSTRUCTIONS
Counseling and Referral of Other Preventative  (Italic type indicates deductible and co-insurance are waived)    Patient Name: Lynda Parr  Today's Date: 7/24/2024    Health Maintenance       Date Due Completion Date    High Dose Statin Never done ---    RSV Vaccine (Age 60+ and Pregnant patients) (1 - 1-dose 60+ series) Never done ---    COVID-19 Vaccine (5 - 2023-24 season) 09/01/2023 4/5/2022    DEXA Scan 10/19/2024 10/19/2021    Influenza Vaccine (1) 09/01/2024 10/12/2023    Mammogram 02/23/2025 2/23/2024    Hemoglobin A1c (Prediabetes) 04/19/2025 4/19/2024    Colorectal Cancer Screening 11/22/2028 11/22/2023    Override on 10/5/2020: Done (polyps, Dr. Koch, repeat 3 yrs)    Lipid Panel 04/19/2029 4/19/2024    TETANUS VACCINE 08/30/2030 8/30/2020        No orders of the defined types were placed in this encounter.    The following information is provided to all patients.  This information is to help you find resources for any of the problems found today that may be affecting your health:                  Living healthy guide: ms.gov    Understanding Diabetes: www.diabetes.org      Eating healthy: www.cdc.gov/healthyweight      CDC home safety checklist: www.cdc.gov/steadi/patient.html      Agency on Aging: ms.gov    Alcoholics anonymous (AA): www.aa.org      Physical Activity: www.amandeep.nih.gov/wk3xlmq      Tobacco use: ms.gov

## 2024-07-24 NOTE — PROGRESS NOTES
"  Lynda Parr presented for a  Medicare AWV and comprehensive Health Risk Assessment today. The following components were reviewed and updated:    Medical history  Family History  Social history  Allergies and Current Medications  Health Risk Assessment  Health Maintenance  Care Team         ** See Completed Assessments for Annual Wellness Visit within the encounter summary.**         The following assessments were completed:  Living Situation  CAGE  Depression Screening  Timed Get Up and Go  Whisper Test  Cognitive Function Screening  Nutrition Screening  ADL Screening  PAQ Screening        Opioid documentation:      Patient does not have a current opioid prescription.        Vitals:    07/24/24 0828 07/24/24 0842   BP: (!) 150/72 (!) 141/68   BP Location: Right arm    Patient Position: Sitting    Pulse: 73    Resp: 12    SpO2: 100%    Weight: 67.4 kg (148 lb 9.6 oz)    Height: 5' 1" (1.549 m)      Body mass index is 28.08 kg/m².  Physical Exam  Constitutional: She is oriented to person, place, and time. No distress.   HENT:   Head: Atraumatic.   Mouth/Throat: Mucous membranes are moist.   Cardiovascular: Normal rate and regular rhythm. Pulmonary:      Effort: Pulmonary effort is normal. No respiratory distress.      Breath sounds: Normal breath sounds. No wheezing, rhonchi or rales.      Abdominal: Soft. Bowel sounds are normal. She exhibits no distension. There is no abdominal tenderness.   Musculoskeletal:         General: Normal range of motion.      Cervical back: Neck supple.      Right lower leg: No edema.      Left lower leg: No edema.   Neurological: She is alert and oriented to person, place, and time. Gait normal.   Skin: Skin is warm and dry.   Psychiatric: Her behavior is normal. Mood normal.            Diagnoses and health risks identified today and associated recommendations/orders:    1. Encounter for subsequent annual wellness visit (AWV) in Medicare patient  Appointment for AWV in one year "       2. Benign essential HTN  Continue current medication, DASH diet, home blood pressure monitoring, physical activity 30 minutes on 5 days per week       3. Hypercholesteremia  Statin intolerance, continue current medication, low saturated fat low cholesterol heart healthy diet, physical exercise 30 minutes on five days per week      4. Prediabetes  Continue current medication, diabetic diet, a1c every 3-6 months, physical activity 30 minutes 5 days per week       5. Statin intolerance  Continue current medication, low saturated fat low cholesterol heart healthy diet, physical exercise 30 minutes on five days per week        6. BMI 28.0-28.9,adult  Low saturated fat low cholesterol heart healthy diet, physical exercise 30 minutes on five days per week      Provided Lynda with a 5-10 year written screening schedule and personal prevention plan. Recommendations were developed using the USPSTF age appropriate recommendations. Education, counseling, and referrals were provided as needed. After Visit Summary printed and given to patient which includes a list of additional screenings\tests needed.    Follow up for 1 year for Annual Wellness Visit.    Vicky Peres, BERNARD  I offered to discuss advanced care planning, including how to pick a person who would make decisions for you if you were unable to make them for yourself, called a health care power of , and what kind of decisions you might make such as use of life sustaining treatments such as ventilators and tube feeding when faced with a life limiting illness recorded on a living will that they will need to know. (How you want to be cared for as you near the end of your natural life)     X  Patient has advanced directives written and agrees to provide copies to the institution.

## 2024-07-30 ENCOUNTER — OFFICE VISIT (OUTPATIENT)
Dept: PAIN MEDICINE | Facility: CLINIC | Age: 74
End: 2024-07-30
Payer: MEDICARE

## 2024-07-30 VITALS
BODY MASS INDEX: 28.51 KG/M2 | HEART RATE: 66 BPM | SYSTOLIC BLOOD PRESSURE: 159 MMHG | HEIGHT: 61 IN | WEIGHT: 151 LBS | RESPIRATION RATE: 17 BRPM | DIASTOLIC BLOOD PRESSURE: 59 MMHG

## 2024-07-30 DIAGNOSIS — M54.16 LUMBAR RADICULOPATHY: Primary | Chronic | ICD-10-CM

## 2024-07-30 DIAGNOSIS — M48.062 SPINAL STENOSIS OF LUMBAR REGION WITH NEUROGENIC CLAUDICATION: Chronic | ICD-10-CM

## 2024-07-30 PROCEDURE — 99999PBSHW PR PBB SHADOW TECHNICAL ONLY FILED TO HB: Mod: PBBFAC,,,

## 2024-07-30 PROCEDURE — 99214 OFFICE O/P EST MOD 30 MIN: CPT | Mod: S$PBB,25,, | Performed by: PHYSICIAN ASSISTANT

## 2024-07-30 PROCEDURE — 96372 THER/PROPH/DIAG INJ SC/IM: CPT | Mod: PBBFAC | Performed by: PHYSICIAN ASSISTANT

## 2024-07-30 PROCEDURE — 99213 OFFICE O/P EST LOW 20 MIN: CPT | Mod: PBBFAC,25 | Performed by: PHYSICIAN ASSISTANT

## 2024-07-30 PROCEDURE — 99999 PR PBB SHADOW E&M-EST. PATIENT-LVL III: CPT | Mod: PBBFAC,,, | Performed by: PHYSICIAN ASSISTANT

## 2024-07-30 RX ORDER — METHYLPREDNISOLONE 4 MG/1
TABLET ORAL
Qty: 21 TABLET | Refills: 0 | Status: SHIPPED | OUTPATIENT
Start: 2024-07-30

## 2024-07-30 RX ORDER — KETOROLAC TROMETHAMINE 30 MG/ML
30 INJECTION, SOLUTION INTRAMUSCULAR; INTRAVENOUS
Status: COMPLETED | OUTPATIENT
Start: 2024-07-30 | End: 2024-07-30

## 2024-07-30 RX ADMIN — KETOROLAC TROMETHAMINE 30 MG: 30 INJECTION, SOLUTION INTRAMUSCULAR at 09:07

## 2024-08-05 ENCOUNTER — PATIENT MESSAGE (OUTPATIENT)
Dept: FAMILY MEDICINE | Facility: CLINIC | Age: 74
End: 2024-08-05
Payer: MEDICARE

## 2024-08-06 DIAGNOSIS — M62.838 MUSCLE SPASMS OF BOTH LOWER EXTREMITIES: ICD-10-CM

## 2024-08-06 DIAGNOSIS — M48.062 LUMBAR STENOSIS WITH NEUROGENIC CLAUDICATION: Primary | ICD-10-CM

## 2024-08-06 DIAGNOSIS — M54.16 LUMBAR RADICULOPATHY: Chronic | ICD-10-CM

## 2024-08-15 ENCOUNTER — PATIENT MESSAGE (OUTPATIENT)
Dept: PAIN MEDICINE | Facility: CLINIC | Age: 74
End: 2024-08-15
Payer: MEDICARE

## 2024-08-19 DIAGNOSIS — I10 BENIGN ESSENTIAL HTN: Chronic | ICD-10-CM

## 2024-08-19 DIAGNOSIS — R73.03 PREDIABETES: Chronic | ICD-10-CM

## 2024-08-19 DIAGNOSIS — E78.00 HYPERCHOLESTEREMIA: Primary | ICD-10-CM

## 2024-08-19 DIAGNOSIS — K21.9 GASTROESOPHAGEAL REFLUX DISEASE, UNSPECIFIED WHETHER ESOPHAGITIS PRESENT: Chronic | ICD-10-CM

## 2024-08-19 RX ORDER — LOSARTAN POTASSIUM 25 MG/1
25 TABLET ORAL DAILY
Qty: 90 TABLET | Refills: 0 | Status: SHIPPED | OUTPATIENT
Start: 2024-08-19

## 2024-08-19 RX ORDER — PRAVASTATIN SODIUM 10 MG/1
10 TABLET ORAL NIGHTLY
Qty: 90 TABLET | Refills: 0 | Status: SHIPPED | OUTPATIENT
Start: 2024-08-19

## 2024-08-19 RX ORDER — METFORMIN HYDROCHLORIDE 500 MG/1
500 TABLET ORAL 3 TIMES DAILY
Qty: 270 TABLET | Refills: 0 | Status: SHIPPED | OUTPATIENT
Start: 2024-08-19

## 2024-08-19 RX ORDER — HYDROGEN PEROXIDE 3 %
20 SOLUTION, NON-ORAL MISCELLANEOUS
Qty: 90 CAPSULE | Refills: 0 | Status: SHIPPED | OUTPATIENT
Start: 2024-08-19

## 2024-08-20 NOTE — H&P (VIEW-ONLY)
Subjective:         Patient ID: Lynda Parr is a 73 y.o. female.    Chief Complaint: Leg Pain and Hip Pain      Pain  This is a chronic problem. The current episode started more than 1 year ago. The problem occurs daily. The problem has been waxing and waning. Associated symptoms include arthralgias. Pertinent negatives include no anorexia, change in bowel habit, chest pain, chills, coughing, diaphoresis, fever, neck pain, rash, sore throat, swollen glands, vertigo or vomiting.     Review of Systems   Constitutional:  Negative for activity change, appetite change, chills, diaphoresis, fever and unexpected weight change.   HENT:  Negative for drooling, ear discharge, ear pain, facial swelling, nosebleeds, sore throat, trouble swallowing, voice change and goiter.    Eyes:  Negative for photophobia, pain, discharge, redness and visual disturbance.   Respiratory:  Negative for apnea, cough, choking, chest tightness, shortness of breath, wheezing and stridor.    Cardiovascular:  Negative for chest pain, palpitations and leg swelling.   Gastrointestinal:  Negative for abdominal distention, anorexia, change in bowel habit, diarrhea, rectal pain, vomiting and fecal incontinence.   Endocrine: Negative for cold intolerance, heat intolerance, polydipsia, polyphagia and polyuria.   Genitourinary:  Negative for bladder incontinence, dysuria, flank pain, frequency and hot flashes.   Musculoskeletal:  Positive for arthralgias, back pain and leg pain. Negative for neck pain.   Integumentary:  Negative for color change, pallor and rash.   Allergic/Immunologic: Negative for immunocompromised state.   Neurological:  Negative for dizziness, vertigo, seizures, syncope, facial asymmetry, speech difficulty, light-headedness, memory loss and coordination difficulties.   Hematological:  Negative for adenopathy. Does not bruise/bleed easily.   Psychiatric/Behavioral:  Negative for agitation, behavioral problems, confusion, decreased  concentration, dysphoric mood, hallucinations, self-injury and suicidal ideas. The patient is not nervous/anxious and is not hyperactive.            Past Medical History:   Diagnosis Date    Benign essential HTN     Carpal tunnel syndrome, bilateral upper limbs     Chronic low back pain     Degeneration, intervertebral disc, lumbosacral     GERD (gastroesophageal reflux disease)     Hypercholesterolemia     Insomnia     Iron deficiency anemia     Localized swelling, mass and lump, right lower limb     Lower extremity edema     Lumbar spondylosis     Prediabetes     Restless leg     Slow transit constipation     Spinal stenosis, lumbar     Vitamin D deficiency      Past Surgical History:   Procedure Laterality Date    CATARACT EXTRACTION Left 09/10/2020    CATARACT EXTRACTION Right 10/01/2020    Dr. Stephen Fox    CHOLECYSTECTOMY  1990    COSMETIC SURGERY      eyelid    EPIDURAL STEROID INJECTION INTO LUMBAR SPINE N/A 07/21/2022    Procedure: Injection-steroid-epidural-lumbar  L3-L4  CHRIS  NO SEDATION;  Surgeon: Esther Delgado MD;  Location: [facility];  Service: Pain Management;  Laterality: N/A;  STATES HAD VAC  WILL BRING CARD    EPIDURAL STEROID INJECTION INTO LUMBAR SPINE Bilateral 03/16/2023    Procedure: L3-4 CHRIS;  Surgeon: Esther Delgado MD;  Location: [facility];  Service: Pain Management;  Laterality: Bilateral;  NO ANESTHESIA    EPIDURAL STEROID INJECTION INTO LUMBAR SPINE Bilateral 11/21/2023    Procedure: L3-4 CHRIS;  Surgeon: Esther Delgado MD;  Location: [facility];  Service: Pain Management;  Laterality: Bilateral;  no sedation    EPIDURAL STEROID INJECTION INTO LUMBAR SPINE Bilateral 4/4/2024    Procedure: L5-S1 CHRIS;  Surgeon: Esther Delgado MD;  Location: [facility];  Service: Pain Management;  Laterality: Bilateral;    HIP ARTHROPLASTY Left 10/2015    [facility] med[facility]  ARTHROPLASTY Right     repair of tendon at [facility]    LUMBAR DISCECTOMY  2019    SHOULDER ARTHROSCOPY W/ ROTATOR CUFF REPAIR  2005    SINUS SURGERY  2011    TUBAL LIGATION      VASCULAR SURGERY  12/15/2017    [facility] Ablation per Dr. Herman Bullard     Social History     Socioeconomic History    Marital status:     Number of children: 1   Tobacco Use    Smoking status: Never     Passive exposure: Never    Smokeless tobacco: Never   Substance and Sexual Activity    Alcohol use: Yes     Alcohol/week: 4.0 standard drinks of alcohol     Types: 4 Glasses of wine per week     Comment: per week    Drug use: Never    Sexual activity: Yes     Partners: Male     Birth control/protection: Post-menopausal     Social Determinants of Health     Financial Resource Strain: Low Risk  (2024)    Overall Financial Resource Strain (CARDIA)     Difficulty of Paying Living Expenses: Not hard at all   Food Insecurity: No Food Insecurity (2024)    Hunger Vital Sign     Worried About Running Out of Food in the Last Year: Never true     Ran Out of Food in the Last Year: Never true   Transportation Needs: No Transportation Needs (2024)    PRAPARE - Transportation     Lack of Transportation (Medical): No     Lack of Transportation (Non-Medical): No   Physical Activity: Sufficiently Active (2024)    Exercise Vital Sign     Days of Exercise per Week: 7 days     Minutes of Exercise per Session: 150+ min   Stress: No Stress Concern Present (2024)    [place] [employer] - Occupational Stress Questionnaire     Feeling of Stress : Not at all   Housing Stability: Low Risk  (2024)    Housing Stability Vital Sign     Unable to Pay for Housing in the Last Year: No     Homeless in the Last Year: No     Family History   Problem Relation Name Age of Onset    Hypertension Mother Mom             Coronary artery disease Father Dad     Diabetes Father Dad           "   Heart disease Father Dad             Pulmonary embolism Brother      No Known Problems Son       Review of patient's allergies indicates:   Allergen Reactions    Ace inhibitors Other (See Comments)        Objective:  Vitals:    24 1305   BP: (!) 153/61   Pulse: 61   Resp: 18   Weight: 67.1 kg (148 lb)   Height: 5' 1" (1.549 m)   PainSc:   4             Physical Exam  Vitals and nursing note reviewed. Exam conducted with a chaperone present.   Constitutional:       General: She is awake. She is not in acute distress.     Appearance: Normal appearance. She is not ill-appearing, toxic-appearing or diaphoretic.   HENT:      Head: Normocephalic and atraumatic.      Nose: Nose normal.      Mouth/Throat:      Mouth: Mucous membranes are moist.      Pharynx: Oropharynx is clear.   Eyes:      Conjunctiva/sclera: Conjunctivae normal.      Pupils: Pupils are equal, round, and reactive to light.   Cardiovascular:      Rate and Rhythm: Normal rate.   Pulmonary:      Effort: Pulmonary effort is normal. No respiratory distress.   Abdominal:      Palpations: Abdomen is soft.      Tenderness: There is no guarding.   Musculoskeletal:         General: Normal range of motion.      Cervical back: Normal range of motion and neck supple. No rigidity.   Skin:     General: Skin is warm and dry.      Coloration: Skin is not jaundiced or pale.   Neurological:      General: No focal deficit present.      Mental Status: She is alert and oriented to person, place, and time. Mental status is at baseline.      Cranial Nerves: No cranial nerve deficit (II-XII).   Psychiatric:         Mood and Affect: Mood normal.         Behavior: Behavior normal. Behavior is cooperative.         Thought Content: Thought content normal.           [place] Fluoro for Pain Management  See OP Notes for results.     IMPRESSION: See OP Notes for results.     This procedure was auto-finalized by: [facility]       Lab Visit on 2024   Component " Date Value Ref Range Status    Vitamin B12 06/06/2024 546  193 - 986 pg/mL Final    Iron 06/06/2024 68  50 - 170 µg/dL Final    Iron Saturation 06/06/2024 22  14 - 50 % Final    TIBC 06/06/2024 312  250 - 450 µg/dL Final    Ferritin 06/06/2024 69  8 - 252 ng/mL Final    Folate 06/06/2024 >20.0 (H)  3.1 - 17.5 ng/mL Final    WBC 06/06/2024 6.08  4.50 - 11.00 K/uL Final    RBC 06/06/2024 3.56 (L)  4.20 - 5.40 M/uL Final    Hemoglobin 06/06/2024 10.9 (L)  12.0 - 16.0 g/dL Final    Hematocrit 06/06/2024 34.7 (L)  38.0 - 47.0 % Final    MCV 06/06/2024 97.5 (H)  80.0 - 96.0 fL Final    MCH 06/06/2024 30.6  27.0 - 31.0 pg Final    MCHC 06/06/2024 31.4 (L)  32.0 - 36.0 g/dL Final    RDW 06/06/2024 13.2  11.5 - 14.5 % Final    Platelet Count 06/06/2024 239  150 - 400 K/uL Final    MPV 06/06/2024 10.4  9.4 - 12.4 fL Final    Neutrophils % 06/06/2024 46.2 (L)  53.0 - 65.0 % Final    Lymphocytes % 06/06/2024 39.1  27.0 - 41.0 % Final    Monocytes % 06/06/2024 10.2 (H)  2.0 - 6.0 % Final    Eosinophils % 06/06/2024 3.6  1.0 - 4.0 % Final    Basophils % 06/06/2024 0.7  0.0 - 1.0 % Final    Immature Granulocytes % 06/06/2024 0.2  0.0 - 0.4 % Final    nRBC, Auto 06/06/2024 0.0  <=0.0 % Final    Neutrophils, Abs 06/06/2024 2.81  1.80 - 7.70 K/uL Final    Lymphocytes, Absolute 06/06/2024 2.38  1.00 - 4.80 K/uL Final    Monocytes, Absolute 06/06/2024 0.62  0.00 - 0.80 K/uL Final    Eosinophils, Absolute 06/06/2024 0.22  0.00 - 0.50 K/uL Final    Basophils, Absolute 06/06/2024 0.04  0.00 - 0.20 K/uL Final    Immature Granulocytes, Absolute 06/06/2024 0.01  0.00 - 0.04 K/uL Final    nRBC, Absolute 06/06/2024 0.00  <=0.00 x10e3/uL Final    Diff Type 06/06/2024 Auto   Final   Office Visit on 05/23/2024   Component Date Value Ref Range Status    WBC 05/23/2024 9.09  4.50 - 11.00 K/uL Final    RBC 05/23/2024 3.52 (L)  4.20 - 5.40 M/uL Final    Hemoglobin 05/23/2024 10.8 (L)  12.0 - 16.0 g/dL Final    Hematocrit 05/23/2024 35.2 (L)  38.0 -  47.0 % Final    MCV 05/23/2024 100.0 (H)  80.0 - 96.0 fL Final    MCH 05/23/2024 30.7  27.0 - 31.0 pg Final    MCHC 05/23/2024 30.7 (L)  32.0 - 36.0 g/dL Final    RDW 05/23/2024 13.8  11.5 - 14.5 % Final    Platelet Count 05/23/2024 273  150 - 400 K/uL Final    MPV 05/23/2024 11.5  9.4 - 12.4 fL Final    Neutrophils % 05/23/2024 57.3  53.0 - 65.0 % Final    Lymphocytes % 05/23/2024 30.3  27.0 - 41.0 % Final    Monocytes % 05/23/2024 9.4 (H)  2.0 - 6.0 % Final    Eosinophils % 05/23/2024 1.4  1.0 - 4.0 % Final    Basophils % 05/23/2024 0.9  0.0 - 1.0 % Final    Immature Granulocytes % 05/23/2024 0.7 (H)  0.0 - 0.4 % Final    nRBC, Auto 05/23/2024 0.0  <=0.0 % Final    Neutrophils, Abs 05/23/2024 5.22  1.80 - 7.70 K/uL Final    Lymphocytes, Absolute 05/23/2024 2.75  1.00 - 4.80 K/uL Final    Monocytes, Absolute 05/23/2024 0.85 (H)  0.00 - 0.80 K/uL Final    Eosinophils, Absolute 05/23/2024 0.13  0.00 - 0.50 K/uL Final    Basophils, Absolute 05/23/2024 0.08  0.00 - 0.20 K/uL Final    Immature Granulocytes, Absolute 05/23/2024 0.06 (H)  0.00 - 0.04 K/uL Final    nRBC, Absolute 05/23/2024 0.00  <=0.00 x10e3/uL Final    Diff Type 05/23/2024 Auto   Final   Lab Visit on 04/19/2024   Component Date Value Ref Range Status    Sodium 04/19/2024 143  136 - 145 mmol/L Final    Potassium 04/19/2024 4.5  3.5 - 5.1 mmol/L Final    Chloride 04/19/2024 112 (H)  98 - 107 mmol/L Final    CO2 04/19/2024 27  21 - 32 mmol/L Final    Anion Gap 04/19/2024 9  7 - 16 mmol/L Final    Glucose 04/19/2024 101  74 - 106 mg/dL Final    BUN 04/19/2024 19 (H)  7 - 18 mg/dL Final    Creatinine 04/19/2024 0.78  0.55 - 1.02 mg/dL Final    BUN/Creatinine Ratio 04/19/2024 24 (H)  6 - 20 Final    Calcium 04/19/2024 9.3  8.5 - 10.1 mg/dL Final    Total Protein 04/19/2024 7.1  6.4 - 8.2 g/dL Final    Albumin 04/19/2024 3.6  3.5 - 5.0 g/dL Final    Globulin 04/19/2024 3.5  2.0 - 4.0 g/dL Final    A/G Ratio 04/19/2024 1.0   Final    Bilirubin, Total 04/19/2024  0.5  >0.0 - 1.2 mg/dL Final    Alk Phos 04/19/2024 67  55 - 142 U/L Final    ALT 04/19/2024 26  13 - 56 U/L Final    AST 04/19/2024 20  15 - 37 U/L Final    eGFR 04/19/2024 81  >=60 mL/min/1.73m2 Final    Triglycerides 04/19/2024 45  35 - 150 mg/dL Final    Cholesterol 04/19/2024 221 (H)  0 - 200 mg/dL Final    HDL Cholesterol 04/19/2024 85 (H)  40 - 60 mg/dL Final    Cholesterol/HDL Ratio (Risk Factor) 04/19/2024 2.6   Final    Non-HDL 04/19/2024 136  mg/dL Final    LDL Calculated 04/19/2024 127  mg/dL Final    LDL/HDL 04/19/2024 1.5   Final    VLDL 04/19/2024 9  mg/dL Final    Hemoglobin A1C 04/19/2024 5.3  4.5 - 6.6 % Final    Estimated Average Glucose 04/19/2024 105  mg/dL Final    WBC 04/19/2024 8.03  4.50 - 11.00 K/uL Final    RBC 04/19/2024 3.66 (L)  4.20 - 5.40 M/uL Final    Hemoglobin 04/19/2024 11.2 (L)  12.0 - 16.0 g/dL Final    Hematocrit 04/19/2024 36.1 (L)  38.0 - 47.0 % Final    MCV 04/19/2024 98.6 (H)  80.0 - 96.0 fL Final    MCH 04/19/2024 30.6  27.0 - 31.0 pg Final    MCHC 04/19/2024 31.0 (L)  32.0 - 36.0 g/dL Final    RDW 04/19/2024 13.6  11.5 - 14.5 % Final    Platelet Count 04/19/2024 263  150 - 400 K/uL Final    MPV 04/19/2024 10.7  9.4 - 12.4 fL Final    Neutrophils % 04/19/2024 53.9  53.0 - 65.0 % Final    Lymphocytes % 04/19/2024 34.5  27.0 - 41.0 % Final    Monocytes % 04/19/2024 8.6 (H)  2.0 - 6.0 % Final    Eosinophils % 04/19/2024 2.2  1.0 - 4.0 % Final    Basophils % 04/19/2024 0.6  0.0 - 1.0 % Final    Immature Granulocytes % 04/19/2024 0.2  0.0 - 0.4 % Final    nRBC, Auto 04/19/2024 0.0  <=0.0 % Final    Neutrophils, Abs 04/19/2024 4.32  1.80 - 7.70 K/uL Final    Lymphocytes, Absolute 04/19/2024 2.77  1.00 - 4.80 K/uL Final    Monocytes, Absolute 04/19/2024 0.69  0.00 - 0.80 K/uL Final    Eosinophils, Absolute 04/19/2024 0.18  0.00 - 0.50 K/uL Final    Basophils, Absolute 04/19/2024 0.05  0.00 - 0.20 K/uL Final    Immature Granulocytes, Absolute 04/19/2024 0.02  0.00 - 0.04 K/uL Final     nRBC, Absolute 04/19/2024 0.00  <=0.00 x10e3/uL Final    Diff Type 04/19/2024 Auto   Final         No orders of the defined types were placed in this encounter.      Requested Prescriptions      No prescriptions requested or ordered in this encounter       Assessment:     No diagnosis found.           MRI right hip [facility] March 14, 2024  No acute bony abnormality  Evidence of intramuscular partial tear of the gluteus medius tendon on the right just superior to the greater trochanter level    MRI lumbar spine [facility] January 18, 2024  Vertebral bodies are normal in height if you with previous lumbar fusion at L4-5 appearing unchanged. There is mild spondylolisthesis at this and.   Multiple level degenerative changes      Plan:    Not using narcotics from our office    Declines gabapentin/amitriptyline due to sedation      History of TLIF L4-5 Dr. Clark, October 2019    Follows orthopedics  Dr Elton Kent at [facility] in [facility]    She had L4/5 CHRIS # 1, July 21, 2023, she states she had 100% relief after procedure, his procedure did help improve her level of function    She had lumbar L3/4 CHRIS # 2, November 21, 2023 she states she had 95% relief after procedure, the procedure did help improve her level function    She had lumbar L5/S1 CHRIS # 1 April 4, 2024  She had 85% relief after procedure, procedure did help improve her level function      Complaint of lower back pain right buttock down the back of the right leg pain numbness and tingling S1 distribution worse at night radicular in nature     Denies loss of bowel or bladder function     Requesting repeat lumbar procedure    She states she continues to be very active golfing 4 times a week    After discussing options will proceed with     Continue home exercise program as directed    Indications for this procedure for this specific patient include the following     - Injections being provided as part of a comprehensive pain  management program.    - Pt has failed 6 weeks of conservative therapy including oral meds, PT and or home exercise program which has been discussed with the patient   - Injection being provided for suspected radicular pain.    - Pain scale of greater than or equal to 3/10 with functional impairment  - No evidence of local or systemic infection, bleeding tendency or unstable medical condition.    - Pain is causing significant functional limitation resulting in diminished quality of life and impaired age appropriate ADL's.   - Repeat injections are done no sooner than 7 days after the previous injection  - Epidural done for suspected radicular pain along dermatome of nerve   - Epidural done to differentiate level of radicular nerve root pain   -procedure done prior to surgical consideration  - Repeat injections done only when pt reports 50% improvement in pain from previous injections    -increased level of function  - patient is aware if they take any NSAIDs/anticoagulant prior to procedure procedure will be postponed, this was listed on the preop instructions and highlighted, list of NSAIDs/anticoagulant reviewed with patient to include methotrexate  - Injection done at L5/S1 level(s) which is consistent with patient's dermatomal pain complaint  The planned medically necessary  surgical procedure is performed in a hospital outpatient department and not in an ambulatory surgical center due to:     -there is no geographically assessable ambulatory surgery center that has the  necessary equipment and fluoroscopy needed for the procedure     -there is no geographically assessable ambulatory surgical center available at which the physician has privileges     -an ASC's  specific  guideline regarding the individuals weight or health conditions that prevent the use of an ASC     -done under fluoro  Monitor anesthesia request is medically indicated for the scheduled nerve block procedure due to:  1- needle phobia and  anxiety, placing  the patient at risk during the provided service.  2-patient has an ASA class greater than 3 and requires constant presence of an anesthesiologist during the procedure,   3-patient has severe problems hard to lie still  4-patient suffers from chronic pain and is unable to function due to  diminished ADLs    Schedule lumbar L5/S1 epidural steroid injection # 2, lumbar radiculopathy    Dr. Delgado    Bring original prescription medication bottles/container/box with labels to each visit

## 2024-08-20 NOTE — PROGRESS NOTES
Subjective:         Patient ID: Lynda Parr is a 73 y.o. female.    Chief Complaint: Leg Pain and Hip Pain      Pain  This is a chronic problem. The current episode started more than 1 year ago. The problem occurs daily. The problem has been waxing and waning. Associated symptoms include arthralgias. Pertinent negatives include no anorexia, change in bowel habit, chest pain, chills, coughing, diaphoresis, fever, neck pain, rash, sore throat, swollen glands, vertigo or vomiting.     Review of Systems   Constitutional:  Negative for activity change, appetite change, chills, diaphoresis, fever and unexpected weight change.   HENT:  Negative for drooling, ear discharge, ear pain, facial swelling, nosebleeds, sore throat, trouble swallowing, voice change and goiter.    Eyes:  Negative for photophobia, pain, discharge, redness and visual disturbance.   Respiratory:  Negative for apnea, cough, choking, chest tightness, shortness of breath, wheezing and stridor.    Cardiovascular:  Negative for chest pain, palpitations and leg swelling.   Gastrointestinal:  Negative for abdominal distention, anorexia, change in bowel habit, diarrhea, rectal pain, vomiting and fecal incontinence.   Endocrine: Negative for cold intolerance, heat intolerance, polydipsia, polyphagia and polyuria.   Genitourinary:  Negative for bladder incontinence, dysuria, flank pain, frequency and hot flashes.   Musculoskeletal:  Positive for arthralgias, back pain and leg pain. Negative for neck pain.   Integumentary:  Negative for color change, pallor and rash.   Allergic/Immunologic: Negative for immunocompromised state.   Neurological:  Negative for dizziness, vertigo, seizures, syncope, facial asymmetry, speech difficulty, light-headedness, memory loss and coordination difficulties.   Hematological:  Negative for adenopathy. Does not bruise/bleed easily.   Psychiatric/Behavioral:  Negative for agitation, behavioral problems, confusion, decreased  concentration, dysphoric mood, hallucinations, self-injury and suicidal ideas. The patient is not nervous/anxious and is not hyperactive.            Past Medical History:   Diagnosis Date    Benign essential HTN     Carpal tunnel syndrome, bilateral upper limbs     Chronic low back pain     Degeneration, intervertebral disc, lumbosacral     GERD (gastroesophageal reflux disease)     Hypercholesterolemia     Insomnia     Iron deficiency anemia     Localized swelling, mass and lump, right lower limb     Lower extremity edema     Lumbar spondylosis     Prediabetes     Restless leg     Slow transit constipation     Spinal stenosis, lumbar     Vitamin D deficiency      Past Surgical History:   Procedure Laterality Date    CATARACT EXTRACTION Left 09/10/2020    CATARACT EXTRACTION Right 10/01/2020    Dr. Stephen Fox    CHOLECYSTECTOMY  1990    COSMETIC SURGERY      eyelid    EPIDURAL STEROID INJECTION INTO LUMBAR SPINE N/A 07/21/2022    Procedure: Injection-steroid-epidural-lumbar  L3-L4  CHRIS  NO SEDATION;  Surgeon: Esther Delgado MD;  Location: [facility];  Service: Pain Management;  Laterality: N/A;  STATES HAD VAC  WILL BRING CARD    EPIDURAL STEROID INJECTION INTO LUMBAR SPINE Bilateral 03/16/2023    Procedure: L3-4 CHRIS;  Surgeon: Esther Delgado MD;  Location: [facility];  Service: Pain Management;  Laterality: Bilateral;  NO ANESTHESIA    EPIDURAL STEROID INJECTION INTO LUMBAR SPINE Bilateral 11/21/2023    Procedure: L3-4 CHRIS;  Surgeon: Esther Delgado MD;  Location: [facility];  Service: Pain Management;  Laterality: Bilateral;  no sedation    EPIDURAL STEROID INJECTION INTO LUMBAR SPINE Bilateral 4/4/2024    Procedure: L5-S1 CHRIS;  Surgeon: Esther Delgado MD;  Location: [facility];  Service: Pain Management;  Laterality: Bilateral;    HIP ARTHROPLASTY Left 10/2015    [facility] medius [facility]  ARTHROPLASTY Right     repair of tendon at [facility]    LUMBAR DISCECTOMY  2019    SHOULDER ARTHROSCOPY W/ ROTATOR CUFF REPAIR  2005    SINUS SURGERY  2011    TUBAL LIGATION      VASCULAR SURGERY  12/15/2017    [facility] Ablation per Dr. Herman Bullard     Social History     Socioeconomic History    Marital status:     Number of children: 1   Tobacco Use    Smoking status: Never     Passive exposure: Never    Smokeless tobacco: Never   Substance and Sexual Activity    Alcohol use: Yes     Alcohol/week: 4.0 standard drinks of alcohol     Types: 4 Glasses of wine per week     Comment: per week    Drug use: Never    Sexual activity: Yes     Partners: Male     Birth control/protection: Post-menopausal     Social Determinants of Health     Financial Resource Strain: Low Risk  (2024)    Overall Financial Resource Strain (CARDIA)     Difficulty of Paying Living Expenses: Not hard at all   Food Insecurity: No Food Insecurity (2024)    Hunger Vital Sign     Worried About Running Out of Food in the Last Year: Never true     Ran Out of Food in the Last Year: Never true   Transportation Needs: No Transportation Needs (2024)    PRAPARE - Transportation     Lack of Transportation (Medical): No     Lack of Transportation (Non-Medical): No   Physical Activity: Sufficiently Active (2024)    Exercise Vital Sign     Days of Exercise per Week: 7 days     Minutes of Exercise per Session: 150+ min   Stress: No Stress Concern Present (2024)    [employer] - Occupational Stress Questionnaire     Feeling of Stress : Not at all   Housing Stability: Low Risk  (2024)    Housing Stability Vital Sign     Unable to Pay for Housing in the Last Year: No     Homeless in the Last Year: No     Family History   Problem Relation Name Age of Onset    Hypertension Mother Mom             Coronary artery disease Father Dad     Diabetes Father Dad           "   Heart disease Father Dad             Pulmonary embolism Brother      No Known Problems Son       Review of patient's allergies indicates:   Allergen Reactions    Ace inhibitors Other (See Comments)        Objective:  Vitals:    24 1305   BP: (!) 153/61   Pulse: 61   Resp: 18   Weight: 67.1 kg (148 lb)   Height: 5' 1" (1.549 m)   PainSc:   4             Physical Exam  Vitals and nursing note reviewed. Exam conducted with a chaperone present.   Constitutional:       General: She is awake. She is not in acute distress.     Appearance: Normal appearance. She is not ill-appearing, toxic-appearing or diaphoretic.   HENT:      Head: Normocephalic and atraumatic.      Nose: Nose normal.      Mouth/Throat:      Mouth: Mucous membranes are moist.      Pharynx: Oropharynx is clear.   Eyes:      Conjunctiva/sclera: Conjunctivae normal.      Pupils: Pupils are equal, round, and reactive to light.   Cardiovascular:      Rate and Rhythm: Normal rate.   Pulmonary:      Effort: Pulmonary effort is normal. No respiratory distress.   Abdominal:      Palpations: Abdomen is soft.      Tenderness: There is no guarding.   Musculoskeletal:         General: Normal range of motion.      Cervical back: Normal range of motion and neck supple. No rigidity.   Skin:     General: Skin is warm and dry.      Coloration: Skin is not jaundiced or pale.   Neurological:      General: No focal deficit present.      Mental Status: She is alert and oriented to person, place, and time. Mental status is at baseline.      Cranial Nerves: No cranial nerve deficit (II-XII).   Psychiatric:         Mood and Affect: Mood normal.         Behavior: Behavior normal. Behavior is cooperative.         Thought Content: Thought content normal.           [place] Fluoro for Pain Management  See OP Notes for results.     IMPRESSION: See OP Notes for results.     This procedure was auto-finalized by: Virtual Radiologist       Lab Visit on 2024   Component " Date Value Ref Range Status    Vitamin B12 06/06/2024 546  193 - 986 pg/mL Final    Iron 06/06/2024 68  50 - 170 µg/dL Final    Iron Saturation 06/06/2024 22  14 - 50 % Final    TIBC 06/06/2024 312  250 - 450 µg/dL Final    Ferritin 06/06/2024 69  8 - 252 ng/mL Final    Folate 06/06/2024 >20.0 (H)  3.1 - 17.5 ng/mL Final    WBC 06/06/2024 6.08  4.50 - 11.00 K/uL Final    RBC 06/06/2024 3.56 (L)  4.20 - 5.40 M/uL Final    Hemoglobin 06/06/2024 10.9 (L)  12.0 - 16.0 g/dL Final    Hematocrit 06/06/2024 34.7 (L)  38.0 - 47.0 % Final    MCV 06/06/2024 97.5 (H)  80.0 - 96.0 fL Final    MCH 06/06/2024 30.6  27.0 - 31.0 pg Final    MCHC 06/06/2024 31.4 (L)  32.0 - 36.0 g/dL Final    RDW 06/06/2024 13.2  11.5 - 14.5 % Final    Platelet Count 06/06/2024 239  150 - 400 K/uL Final    MPV 06/06/2024 10.4  9.4 - 12.4 fL Final    Neutrophils % 06/06/2024 46.2 (L)  53.0 - 65.0 % Final    Lymphocytes % 06/06/2024 39.1  27.0 - 41.0 % Final    Monocytes % 06/06/2024 10.2 (H)  2.0 - 6.0 % Final    Eosinophils % 06/06/2024 3.6  1.0 - 4.0 % Final    Basophils % 06/06/2024 0.7  0.0 - 1.0 % Final    Immature Granulocytes % 06/06/2024 0.2  0.0 - 0.4 % Final    nRBC, Auto 06/06/2024 0.0  <=0.0 % Final    Neutrophils, Abs 06/06/2024 2.81  1.80 - 7.70 K/uL Final    Lymphocytes, Absolute 06/06/2024 2.38  1.00 - 4.80 K/uL Final    Monocytes, Absolute 06/06/2024 0.62  0.00 - 0.80 K/uL Final    Eosinophils, Absolute 06/06/2024 0.22  0.00 - 0.50 K/uL Final    Basophils, Absolute 06/06/2024 0.04  0.00 - 0.20 K/uL Final    Immature Granulocytes, Absolute 06/06/2024 0.01  0.00 - 0.04 K/uL Final    nRBC, Absolute 06/06/2024 0.00  <=0.00 x10e3/uL Final    Diff Type 06/06/2024 Auto   Final   Office Visit on 05/23/2024   Component Date Value Ref Range Status    WBC 05/23/2024 9.09  4.50 - 11.00 K/uL Final    RBC 05/23/2024 3.52 (L)  4.20 - 5.40 M/uL Final    Hemoglobin 05/23/2024 10.8 (L)  12.0 - 16.0 g/dL Final    Hematocrit 05/23/2024 35.2 (L)  38.0 -  47.0 % Final    MCV 05/23/2024 100.0 (H)  80.0 - 96.0 fL Final    MCH 05/23/2024 30.7  27.0 - 31.0 pg Final    MCHC 05/23/2024 30.7 (L)  32.0 - 36.0 g/dL Final    RDW 05/23/2024 13.8  11.5 - 14.5 % Final    Platelet Count 05/23/2024 273  150 - 400 K/uL Final    MPV 05/23/2024 11.5  9.4 - 12.4 fL Final    Neutrophils % 05/23/2024 57.3  53.0 - 65.0 % Final    Lymphocytes % 05/23/2024 30.3  27.0 - 41.0 % Final    Monocytes % 05/23/2024 9.4 (H)  2.0 - 6.0 % Final    Eosinophils % 05/23/2024 1.4  1.0 - 4.0 % Final    Basophils % 05/23/2024 0.9  0.0 - 1.0 % Final    Immature Granulocytes % 05/23/2024 0.7 (H)  0.0 - 0.4 % Final    nRBC, Auto 05/23/2024 0.0  <=0.0 % Final    Neutrophils, Abs 05/23/2024 5.22  1.80 - 7.70 K/uL Final    Lymphocytes, Absolute 05/23/2024 2.75  1.00 - 4.80 K/uL Final    Monocytes, Absolute 05/23/2024 0.85 (H)  0.00 - 0.80 K/uL Final    Eosinophils, Absolute 05/23/2024 0.13  0.00 - 0.50 K/uL Final    Basophils, Absolute 05/23/2024 0.08  0.00 - 0.20 K/uL Final    Immature Granulocytes, Absolute 05/23/2024 0.06 (H)  0.00 - 0.04 K/uL Final    nRBC, Absolute 05/23/2024 0.00  <=0.00 x10e3/uL Final    Diff Type 05/23/2024 Auto   Final   Lab Visit on 04/19/2024   Component Date Value Ref Range Status    Sodium 04/19/2024 143  136 - 145 mmol/L Final    Potassium 04/19/2024 4.5  3.5 - 5.1 mmol/L Final    Chloride 04/19/2024 112 (H)  98 - 107 mmol/L Final    CO2 04/19/2024 27  21 - 32 mmol/L Final    Anion Gap 04/19/2024 9  7 - 16 mmol/L Final    Glucose 04/19/2024 101  74 - 106 mg/dL Final    BUN 04/19/2024 19 (H)  7 - 18 mg/dL Final    Creatinine 04/19/2024 0.78  0.55 - 1.02 mg/dL Final    BUN/Creatinine Ratio 04/19/2024 24 (H)  6 - 20 Final    Calcium 04/19/2024 9.3  8.5 - 10.1 mg/dL Final    Total Protein 04/19/2024 7.1  6.4 - 8.2 g/dL Final    Albumin 04/19/2024 3.6  3.5 - 5.0 g/dL Final    Globulin 04/19/2024 3.5  2.0 - 4.0 g/dL Final    A/G Ratio 04/19/2024 1.0   Final    Bilirubin, Total 04/19/2024  0.5  >0.0 - 1.2 mg/dL Final    Alk Phos 04/19/2024 67  55 - 142 U/L Final    ALT 04/19/2024 26  13 - 56 U/L Final    AST 04/19/2024 20  15 - 37 U/L Final    eGFR 04/19/2024 81  >=60 mL/min/1.73m2 Final    Triglycerides 04/19/2024 45  35 - 150 mg/dL Final    Cholesterol 04/19/2024 221 (H)  0 - 200 mg/dL Final    HDL Cholesterol 04/19/2024 85 (H)  40 - 60 mg/dL Final    Cholesterol/HDL Ratio (Risk Factor) 04/19/2024 2.6   Final    Non-HDL 04/19/2024 136  mg/dL Final    LDL Calculated 04/19/2024 127  mg/dL Final    LDL/HDL 04/19/2024 1.5   Final    VLDL 04/19/2024 9  mg/dL Final    Hemoglobin A1C 04/19/2024 5.3  4.5 - 6.6 % Final    Estimated Average Glucose 04/19/2024 105  mg/dL Final    WBC 04/19/2024 8.03  4.50 - 11.00 K/uL Final    RBC 04/19/2024 3.66 (L)  4.20 - 5.40 M/uL Final    Hemoglobin 04/19/2024 11.2 (L)  12.0 - 16.0 g/dL Final    Hematocrit 04/19/2024 36.1 (L)  38.0 - 47.0 % Final    MCV 04/19/2024 98.6 (H)  80.0 - 96.0 fL Final    MCH 04/19/2024 30.6  27.0 - 31.0 pg Final    MCHC 04/19/2024 31.0 (L)  32.0 - 36.0 g/dL Final    RDW 04/19/2024 13.6  11.5 - 14.5 % Final    Platelet Count 04/19/2024 263  150 - 400 K/uL Final    MPV 04/19/2024 10.7  9.4 - 12.4 fL Final    Neutrophils % 04/19/2024 53.9  53.0 - 65.0 % Final    Lymphocytes % 04/19/2024 34.5  27.0 - 41.0 % Final    Monocytes % 04/19/2024 8.6 (H)  2.0 - 6.0 % Final    Eosinophils % 04/19/2024 2.2  1.0 - 4.0 % Final    Basophils % 04/19/2024 0.6  0.0 - 1.0 % Final    Immature Granulocytes % 04/19/2024 0.2  0.0 - 0.4 % Final    nRBC, Auto 04/19/2024 0.0  <=0.0 % Final    Neutrophils, Abs 04/19/2024 4.32  1.80 - 7.70 K/uL Final    Lymphocytes, Absolute 04/19/2024 2.77  1.00 - 4.80 K/uL Final    Monocytes, Absolute 04/19/2024 0.69  0.00 - 0.80 K/uL Final    Eosinophils, Absolute 04/19/2024 0.18  0.00 - 0.50 K/uL Final    Basophils, Absolute 04/19/2024 0.05  0.00 - 0.20 K/uL Final    Immature Granulocytes, Absolute 04/19/2024 0.02  0.00 - 0.04 K/uL Final     nRBC, Absolute 04/19/2024 0.00  <=0.00 x10e3/uL Final    Diff Type 04/19/2024 Auto   Final         No orders of the defined types were placed in this encounter.      Requested Prescriptions      No prescriptions requested or ordered in this encounter       Assessment:     No diagnosis found.           MRI right hip [facility] March 14, 2024  No acute bony abnormality  Evidence of intramuscular partial tear of the gluteus medius tendon on the right just superior to the greater trochanter level    MRI lumbar spine [facility] January 18, 2024  Vertebral bodies are normal in height if you with previous lumbar fusion at L4-5 appearing unchanged. There is mild spondylolisthesis at this and.   Multiple level degenerative changes      Plan:    Not using narcotics from our office    Declines gabapentin/amitriptyline due to sedation      History of TLIF L4-5 Dr. Clark, October 2019    Follows orthopedics  Dr Elton Kent at [facility] in [facility] [place]    She had L4/5 CHRIS # 1, July 21, 2023, she states she had 100% relief after procedure, his procedure did help improve her level of function    She had lumbar L3/4 CHRIS # 2, November 21, 2023 she states she had 95% relief after procedure, the procedure did help improve her level function    She had lumbar L5/S1 CHRIS # 1 April 4, 2024  She had 85% relief after procedure, procedure did help improve her level function      Complaint of lower back pain right buttock down the back of the right leg pain numbness and tingling S1 distribution worse at night radicular in nature     Denies loss of bowel or bladder function     Requesting repeat lumbar procedure    She states she continues to be very active golfing 4 times a week    After discussing options will proceed with     Continue home exercise program as directed    Indications for this procedure for this specific patient include the following     - Injections being provided as part of a comprehensive pain  management program.    - Pt has failed 6 weeks of conservative therapy including oral meds, PT and or home exercise program which has been discussed with the patient   - Injection being provided for suspected radicular pain.    - Pain scale of greater than or equal to 3/10 with functional impairment  - No evidence of local or systemic infection, bleeding tendency or unstable medical condition.    - Pain is causing significant functional limitation resulting in diminished quality of life and impaired age appropriate ADL's.   - Repeat injections are done no sooner than 7 days after the previous injection  - Epidural done for suspected radicular pain along dermatome of nerve   - Epidural done to differentiate level of radicular nerve root pain   -procedure done prior to surgical consideration  - Repeat injections done only when pt reports 50% improvement in pain from previous injections    -increased level of function  - patient is aware if they take any NSAIDs/anticoagulant prior to procedure procedure will be postponed, this was listed on the preop instructions and highlighted, list of NSAIDs/anticoagulant reviewed with patient to include methotrexate  - Injection done at L5/S1 level(s) which is consistent with patient's dermatomal pain complaint  The planned medically necessary  surgical procedure is performed in a hospital outpatient department and not in an ambulatory surgical center due to:     -there is no geographically assessable ambulatory surgery center that has the  necessary equipment and fluoroscopy needed for the procedure     -there is no geographically assessable ambulatory surgical center available at which the physician has privileges     -an ASC's  specific  guideline regarding the individuals weight or health conditions that prevent the use of an ASC     -done under fluoro  Monitor anesthesia request is medically indicated for the scheduled nerve block procedure due to:  1- needle phobia and  anxiety, placing  the patient at risk during the provided service.  2-patient has an ASA class greater than 3 and requires constant presence of an anesthesiologist during the procedure,   3-patient has severe problems hard to lie still  4-patient suffers from chronic pain and is unable to function due to  diminished ADLs    Schedule lumbar L5/S1 epidural steroid injection # 2, lumbar radiculopathy    Dr. Delgado    Bring original prescription medication bottles/container/box with labels to each visit

## 2024-08-21 ENCOUNTER — OFFICE VISIT (OUTPATIENT)
Dept: PAIN MEDICINE | Facility: CLINIC | Age: 74
End: 2024-08-21
Payer: MEDICARE

## 2024-08-21 VITALS
SYSTOLIC BLOOD PRESSURE: 153 MMHG | HEART RATE: 61 BPM | BODY MASS INDEX: 27.94 KG/M2 | WEIGHT: 148 LBS | HEIGHT: 61 IN | RESPIRATION RATE: 18 BRPM | DIASTOLIC BLOOD PRESSURE: 61 MMHG

## 2024-08-21 DIAGNOSIS — M48.062 SPINAL STENOSIS OF LUMBAR REGION WITH NEUROGENIC CLAUDICATION: Chronic | ICD-10-CM

## 2024-08-21 DIAGNOSIS — M54.16 LUMBAR RADICULOPATHY: Primary | Chronic | ICD-10-CM

## 2024-08-21 PROCEDURE — 99213 OFFICE O/P EST LOW 20 MIN: CPT | Mod: S$PBB,,, | Performed by: PHYSICIAN ASSISTANT

## 2024-08-21 PROCEDURE — 99999 PR PBB SHADOW E&M-EST. PATIENT-LVL V: CPT | Mod: PBBFAC,,, | Performed by: PHYSICIAN ASSISTANT

## 2024-08-21 PROCEDURE — 99215 OFFICE O/P EST HI 40 MIN: CPT | Mod: PBBFAC | Performed by: PHYSICIAN ASSISTANT

## 2024-08-21 NOTE — PATIENT INSTRUCTIONS

## 2024-09-05 ENCOUNTER — HOSPITAL ENCOUNTER (OUTPATIENT)
Facility: HOSPITAL | Age: 74
Discharge: HOME | End: 2024-09-05
Attending: PAIN MEDICINE | Admitting: PAIN MEDICINE
Payer: MEDICARE

## 2024-09-05 VITALS
OXYGEN SATURATION: 100 % | BODY MASS INDEX: 27.56 KG/M2 | SYSTOLIC BLOOD PRESSURE: 144 MMHG | DIASTOLIC BLOOD PRESSURE: 59 MMHG | HEART RATE: 58 BPM | WEIGHT: 146 LBS | TEMPERATURE: 99 F | RESPIRATION RATE: 11 BRPM | HEIGHT: 61 IN

## 2024-09-05 DIAGNOSIS — M54.17 LUMBOSACRAL RADICULOPATHY: ICD-10-CM

## 2024-09-05 PROCEDURE — 62323 NJX INTERLAMINAR LMBR/SAC: CPT | Mod: ,,, | Performed by: PAIN MEDICINE

## 2024-09-05 PROCEDURE — 25500020 PHARM REV CODE 255: Performed by: PAIN MEDICINE

## 2024-09-05 PROCEDURE — 62323 NJX INTERLAMINAR LMBR/SAC: CPT | Performed by: PAIN MEDICINE

## 2024-09-05 PROCEDURE — 63600175 PHARM REV CODE 636 W HCPCS: Performed by: PAIN MEDICINE

## 2024-09-05 RX ORDER — IOPAMIDOL 612 MG/ML
INJECTION, SOLUTION INTRATHECAL CODE/TRAUMA/SEDATION MEDICATION
Status: DISCONTINUED | OUTPATIENT
Start: 2024-09-05 | End: 2024-09-05 | Stop reason: HOSPADM

## 2024-09-05 RX ORDER — TRIAMCINOLONE ACETONIDE 40 MG/ML
INJECTION, SUSPENSION INTRA-ARTICULAR; INTRAMUSCULAR CODE/TRAUMA/SEDATION MEDICATION
Status: DISCONTINUED | OUTPATIENT
Start: 2024-09-05 | End: 2024-09-05 | Stop reason: HOSPADM

## 2024-09-05 RX ORDER — SODIUM CHLORIDE 9 MG/ML
INJECTION, SOLUTION INTRAVENOUS CONTINUOUS
Status: DISCONTINUED | OUTPATIENT
Start: 2024-09-05 | End: 2024-09-05 | Stop reason: HOSPADM

## 2024-09-05 NOTE — DISCHARGE SUMMARY
[facility] - Pain Management  Discharge Note  Short Stay    Procedure(s) (LRB):  Injection, Steroid, Epidural, L5/S1 (N/A)      OUTCOME: Patient tolerated treatment/procedure well without complication and is now ready for discharge.    DISPOSITION: Home or Self Care    FINAL DIAGNOSIS:  Lumbosacral radiculopathy    FOLLOWUP: In clinic    DISCHARGE INSTRUCTIONS:  See nurse's notes     TIME SPENT ON DISCHARGE: 5 minutes

## 2024-09-05 NOTE — OP NOTE
"Procedure Note    Procedure Date: 9/5/2024    Procedure Performed:  Lumbar interlaminar epidural steroid injection under fluoroscopy at L5-S1    Indications: Patient failed conservative therapy.      Pre-op diagnosis: Lumbar Radiculopathy    Post-op diagnosis: same    Physician: Esther Delgado MD    Anesthesia:  Low    Medications injected: Kenalog 40mg,  2 mL sterile preservative-free normal saline.    Local anesthetic used: 1% Lidocaine, three ml    Estimated Blood Loss:  None    Complications:  None    Technique:  The patient was interviewed in the holding area and Risks/Benefits were discussed, including, but not limited to, the possibility of new or different pain, bleeding or infection.   All questions were answered.  The patient understood and accepted risks.  Consent was verified and signed.   A time-out was taken to identify patient and procedure prior to starting the procedure. The patient was placed in the prone position on the fluoroscopy table. The area of the lumbar spine was prepped with Chloraprep and draped in a sterile manner. The L 5-S1  interspace was identified and marked under AP fluoroscopy. The skin and subcutaneous tissues overlying the targeted interspace were anesthetized with 3-5 mL of 1% lidocaine using a 25G 1.5" needle.  A 20G  3.5" Tuohy epidural needle was directed toward the interspace under fluoroscopic guidance until the ligamentum flavum was engaged. From this point, a loss of resistance technique with a pulsator syringe a was used to identify entrance of the needle into the epidural space. Once loss of resistance was observed 3mL of Isovue contrast solution was injected. An appropriate epidurogram was noted.  A 3mL mixture consisting of saline and 40 mg of kenalog was injected slowly and without resistance.  The needle was  removed and a sterile Band-Aid dressing was applied to the puncture site.  The patient tolerated the procedure well [facility]  The patient was monitored after the procedure and was given post-procedure and discharge instructions to follow at home. The patient was discharged in a stable condition and accompanied by an adult .    Epidurogram:5 mL allotment of Isovue M 300 contrast revealed excellent delineation from L4-S1. There were no filling defects or obstruction to dye flow noted.  There was no  intravascular or intrathecal spread noted with dye flow.

## 2024-09-05 NOTE — PLAN OF CARE
Plan:  D/c pt via wheelchair at 1315  Informed pt if does not void in 8 hours to go to ER. Notify if redness, drainage, from injection site or fever over next 3-4 days. Rest and drink plenty of fluids for the remainder of the day. No lifting over 5 lbs. For the remainder of the day. Continue regular medications as prescribed. May take pain medications as prescribed.     Pain improved 100%  Pre-procedure pain: 5  Post-procedure pain: 0

## 2024-09-05 NOTE — BRIEF OP NOTE
The  Discharge Note  Short Stay    Admit Date: 9/5/2024    Discharge Date: 9/5/2024    Attending Physician: Esther Delgado     Discharge Provider: Esther Delgado    Diagnosis:  Lumbosacral radiculopathy    Procedure performed:  L5-S1 epidural steroid injection and epidurogram under fluoroscopy    Findings: Procedure tolerated well and without complications. Consistent with diagnosis.    EBL: 0cc    Specimens: None    Discharged Condition: Good    Final Diagnoses: Lumbar radiculopathy [M54.16]    Disposition: Home or Self Care    Hospital Course: No complications, uneventful    Outcome of Hospitalization, Treatment, Procedure, or Surgery:  Patient was admitted for outpatient interventional pain management procedure. The patient tolerated the procedure well with no complications.    Follow up/Patient Instructions:  Follow up as scheduled in Pain Management office in 3-4 weeks.  Patient has received instructions and follow up date and time.    Medications:  Continue previous medications

## 2024-09-11 NOTE — PROGRESS NOTES
Subjective:         Patient ID: Lynda Parr is a 73 y.o. female.    Chief Complaint: Follow-up      Pain  This is a chronic problem. The current episode started more than 1 year ago. The problem occurs daily. The problem has been unchanged. Associated symptoms include arthralgias. Pertinent negatives include no anorexia, change in bowel habit, chest pain, chills, coughing, diaphoresis, fever, neck pain, rash, sore throat, swollen glands, vertigo or vomiting.     Review of Systems   Constitutional:  Negative for activity change, appetite change, chills, diaphoresis, fever and unexpected weight change.   HENT:  Negative for drooling, ear discharge, ear pain, facial swelling, nosebleeds, sore throat, trouble swallowing, voice change and goiter.    Eyes:  Negative for photophobia, pain, discharge, redness and visual disturbance.   Respiratory:  Negative for apnea, cough, choking, chest tightness, shortness of breath, wheezing and stridor.    Cardiovascular:  Negative for chest pain, palpitations and leg swelling.   Gastrointestinal:  Negative for abdominal distention, anorexia, change in bowel habit, diarrhea, rectal pain, vomiting and fecal incontinence.   Endocrine: Negative for cold intolerance, heat intolerance, polydipsia, polyphagia and polyuria.   Genitourinary:  Negative for bladder incontinence, dysuria, flank pain, frequency and hot flashes.   Musculoskeletal:  Positive for arthralgias, back pain and leg pain. Negative for neck pain.   Integumentary:  Negative for color change, pallor and rash.   Allergic/Immunologic: Negative for immunocompromised state.   Neurological:  Negative for dizziness, vertigo, seizures, syncope, facial asymmetry, speech difficulty, light-headedness, memory loss and coordination difficulties.   Hematological:  Negative for adenopathy. Does not bruise/bleed easily.   Psychiatric/Behavioral:  Negative for agitation, behavioral problems, confusion, decreased concentration, dysphoric  mood, hallucinations, self-injury and suicidal ideas. The patient is not nervous/anxious and is not hyperactive.            Past Medical History:   Diagnosis Date    Benign essential HTN     Carpal tunnel syndrome, bilateral upper limbs     Chronic low back pain     Degeneration, intervertebral disc, lumbosacral     GERD (gastroesophageal reflux disease)     Hypercholesterolemia     Insomnia     Iron deficiency anemia     Localized swelling, mass and lump, right lower limb     Lower extremity edema     Lumbar spondylosis     Prediabetes     Restless leg     Slow transit constipation     Spinal stenosis, lumbar     Vitamin D deficiency      Past Surgical History:   Procedure Laterality Date    CATARACT EXTRACTION Left 09/10/2020    CATARACT EXTRACTION Right 10/01/2020    Dr. Stephen Fox    CHOLECYSTECTOMY  1990    COSMETIC SURGERY      eyelid    EPIDURAL STEROID INJECTION N/A 9/5/2024    Procedure: Injection, Steroid, Epidural, L5/S1;  Surgeon: Esther Delgado MD;  Location: [facility];  Service: Pain Management;  Laterality: N/A;    EPIDURAL STEROID INJECTION INTO LUMBAR SPINE N/A 07/21/2022    Procedure: Injection-steroid-epidural-lumbar  L3-L4  CHRIS  NO SEDATION;  Surgeon: Esther Delgado MD;  Location: [facility];  Service: Pain Management;  Laterality: N/A;  STATES HAD VAC  WILL BRING CARD    EPIDURAL STEROID INJECTION INTO LUMBAR SPINE Bilateral 03/16/2023    Procedure: L3-4 CHRIS;  Surgeon: Esther Delgado MD;  Location: [facility];  Service: Pain Management;  Laterality: Bilateral;  NO ANESTHESIA    EPIDURAL STEROID INJECTION INTO LUMBAR SPINE Bilateral 11/21/2023    Procedure: L3-4 CHRIS;  Surgeon: Esther Delgado MD;  Location: [facility];  Service: Pain Management;  Laterality: Bilateral;  no sedation    EPIDURAL STEROID INJECTION INTO LUMBAR SPINE Bilateral 4/4/2024    Procedure: L5-S1 CHRIS;  Surgeon: Esther Delgado MD;  Location: [facility];  Service: Pain  Management;  Laterality: Bilateral;    HIP ARTHROPLASTY Left 10/2015    repair of torn gluteus medius muscle via hip arthroscopy at Dr. Yoli Stephens    HIP ARTHROPLASTY Right 2023    repair of tendon at [facility]    LUMBAR DISCECTOMY  2019    SHOULDER ARTHROSCOPY W/ ROTATOR CUFF REPAIR  2005    SINUS SURGERY  02/2011    TUBAL LIGATION      VASCULAR SURGERY  12/15/2017    R S[facility] Laser Ablation per Dr. Herman Bullard     Social History     Socioeconomic History    Marital status:     Number of children: 1   Tobacco Use    Smoking status: Never     Passive exposure: Never    Smokeless tobacco: Never   Substance and Sexual Activity    Alcohol use: Yes     Alcohol/week: 4.0 standard drinks of alcohol     Types: 4 Glasses of wine per week     Comment: per week    Drug use: Never    Sexual activity: Yes     Partners: Male     Birth control/protection: Post-menopausal     Social Determinants of Health     Financial Resource Strain: Low Risk  (7/24/2024)    Overall Financial Resource Strain (CARDIA)     Difficulty of Paying Living Expenses: Not hard at all   Food Insecurity: No Food Insecurity (7/24/2024)    Hunger Vital Sign     Worried About Running Out of Food in the Last Year: Never true     Ran Out of Food in the Last Year: Never true   Transportation Needs: No Transportation Needs (7/24/2024)    PRAPARE - Transportation     Lack of Transportation (Medical): No     Lack of Transportation (Non-Medical): No   Physical Activity: Sufficiently Active (7/24/2024)    Exercise Vital Sign     Days of Exercise per Week: 7 days     Minutes of Exercise per Session: 150+ min   Stress: No Stress Concern Present (7/24/2024)    [place] [employer] - Occupational Stress Questionnaire     Feeling of Stress : Not at all   Housing Stability: Low Risk  (7/24/2024)    Housing Stability Vital Sign     Unable to Pay for Housing in the Last Year: No     Homeless in the Last Year: No  "    Family History   Problem Relation Name Age of Onset    Hypertension Mother Mom             Coronary artery disease Father Dad     Diabetes Father Dad             Heart disease Father Dad             Pulmonary embolism Brother      No Known Problems Son       Review of patient's allergies indicates:   Allergen Reactions    Ace inhibitors Other (See Comments)        Objective:  Vitals:    24 1421   BP: (!) 144/65   Pulse: 74   Resp: 18   Weight: 67.1 kg (148 lb)   Height: 5' 1" (1.549 m)   PainSc: 0-No pain             Physical Exam  Vitals and nursing note reviewed. Exam conducted with a chaperone present.   Constitutional:       General: She is awake. She is not in acute distress.     Appearance: Normal appearance. She is not ill-appearing, toxic-appearing or diaphoretic.   HENT:      Head: Normocephalic and atraumatic.      Nose: Nose normal.      Mouth/Throat:      Mouth: Mucous membranes are moist.      Pharynx: Oropharynx is clear.   Eyes:      Conjunctiva/sclera: Conjunctivae normal.      Pupils: Pupils are equal, round, and reactive to light.   Cardiovascular:      Rate and Rhythm: Normal rate.   Pulmonary:      Effort: Pulmonary effort is normal. No respiratory distress.   Abdominal:      Palpations: Abdomen is soft.      Tenderness: There is no guarding.   Musculoskeletal:         General: Normal range of motion.      Cervical back: Normal range of motion and neck supple. No rigidity.   Skin:     General: Skin is warm and dry.      Coloration: Skin is not jaundiced or pale.   Neurological:      General: No focal deficit present.      Mental Status: She is alert and oriented to person, place, and time. Mental status is at baseline.      Cranial Nerves: No cranial nerve deficit (II-XII).   Psychiatric:         Mood and Affect: Mood normal.         Behavior: Behavior normal. Behavior is cooperative.         Thought Content: Thought content normal.           FL Fluoro for Pain " Management  See OP Notes for results.     IMPRESSION: See OP Notes for results.     This procedure was auto-finalized by: Virtual Radiologist       Lab Visit on 06/06/2024   Component Date Value Ref Range Status    Vitamin B12 06/06/2024 546  193 - 986 pg/mL Final    Iron 06/06/2024 68  50 - 170 µg/dL Final    Iron Saturation 06/06/2024 22  14 - 50 % Final    TIBC 06/06/2024 312  250 - 450 µg/dL Final    Ferritin 06/06/2024 69  8 - 252 ng/mL Final    Folate 06/06/2024 >20.0 (H)  3.1 - 17.5 ng/mL Final    WBC 06/06/2024 6.08  4.50 - 11.00 K/uL Final    RBC 06/06/2024 3.56 (L)  4.20 - 5.40 M/uL Final    Hemoglobin 06/06/2024 10.9 (L)  12.0 - 16.0 g/dL Final    Hematocrit 06/06/2024 34.7 (L)  38.0 - 47.0 % Final    MCV 06/06/2024 97.5 (H)  80.0 - 96.0 fL Final    MCH 06/06/2024 30.6  27.0 - 31.0 pg Final    MCHC 06/06/2024 31.4 (L)  32.0 - 36.0 g/dL Final    RDW 06/06/2024 13.2  11.5 - 14.5 % Final    Platelet Count 06/06/2024 239  150 - 400 K/uL Final    MPV 06/06/2024 10.4  9.4 - 12.4 fL Final    Neutrophils % 06/06/2024 46.2 (L)  53.0 - 65.0 % Final    Lymphocytes % 06/06/2024 39.1  27.0 - 41.0 % Final    Monocytes % 06/06/2024 10.2 (H)  2.0 - 6.0 % Final    Eosinophils % 06/06/2024 3.6  1.0 - 4.0 % Final    Basophils % 06/06/2024 0.7  0.0 - 1.0 % Final    Immature Granulocytes % 06/06/2024 0.2  0.0 - 0.4 % Final    nRBC, Auto 06/06/2024 0.0  <=0.0 % Final    Neutrophils, Abs 06/06/2024 2.81  1.80 - 7.70 K/uL Final    Lymphocytes, Absolute 06/06/2024 2.38  1.00 - 4.80 K/uL Final    Monocytes, Absolute 06/06/2024 0.62  0.00 - 0.80 K/uL Final    Eosinophils, Absolute 06/06/2024 0.22  0.00 - 0.50 K/uL Final    Basophils, Absolute 06/06/2024 0.04  0.00 - 0.20 K/uL Final    Immature Granulocytes, Absolute 06/06/2024 0.01  0.00 - 0.04 K/uL Final    nRBC, Absolute 06/06/2024 0.00  <=0.00 x10e3/uL Final    Diff Type 06/06/2024 Auto   Final   Office Visit on 05/23/2024   Component Date Value Ref Range Status    WBC  05/23/2024 9.09  4.50 - 11.00 K/uL Final    RBC 05/23/2024 3.52 (L)  4.20 - 5.40 M/uL Final    Hemoglobin 05/23/2024 10.8 (L)  12.0 - 16.0 g/dL Final    Hematocrit 05/23/2024 35.2 (L)  38.0 - 47.0 % Final    MCV 05/23/2024 100.0 (H)  80.0 - 96.0 fL Final    MCH 05/23/2024 30.7  27.0 - 31.0 pg Final    MCHC 05/23/2024 30.7 (L)  32.0 - 36.0 g/dL Final    RDW 05/23/2024 13.8  11.5 - 14.5 % Final    Platelet Count 05/23/2024 273  150 - 400 K/uL Final    MPV 05/23/2024 11.5  9.4 - 12.4 fL Final    Neutrophils % 05/23/2024 57.3  53.0 - 65.0 % Final    Lymphocytes % 05/23/2024 30.3  27.0 - 41.0 % Final    Monocytes % 05/23/2024 9.4 (H)  2.0 - 6.0 % Final    Eosinophils % 05/23/2024 1.4  1.0 - 4.0 % Final    Basophils % 05/23/2024 0.9  0.0 - 1.0 % Final    Immature Granulocytes % 05/23/2024 0.7 (H)  0.0 - 0.4 % Final    nRBC, Auto 05/23/2024 0.0  <=0.0 % Final    Neutrophils, Abs 05/23/2024 5.22  1.80 - 7.70 K/uL Final    Lymphocytes, Absolute 05/23/2024 2.75  1.00 - 4.80 K/uL Final    Monocytes, Absolute 05/23/2024 0.85 (H)  0.00 - 0.80 K/uL Final    Eosinophils, Absolute 05/23/2024 0.13  0.00 - 0.50 K/uL Final    Basophils, Absolute 05/23/2024 0.08  0.00 - 0.20 K/uL Final    Immature Granulocytes, Absolute 05/23/2024 0.06 (H)  0.00 - 0.04 K/uL Final    nRBC, Absolute 05/23/2024 0.00  <=0.00 x10e3/uL Final    Diff Type 05/23/2024 Auto   Final   Lab Visit on 04/19/2024   Component Date Value Ref Range Status    Sodium 04/19/2024 143  136 - 145 mmol/L Final    Potassium 04/19/2024 4.5  3.5 - 5.1 mmol/L Final    Chloride 04/19/2024 112 (H)  98 - 107 mmol/L Final    CO2 04/19/2024 27  21 - 32 mmol/L Final    Anion Gap 04/19/2024 9  7 - 16 mmol/L Final    Glucose 04/19/2024 101  74 - 106 mg/dL Final    BUN 04/19/2024 19 (H)  7 - 18 mg/dL Final    Creatinine 04/19/2024 0.78  0.55 - 1.02 mg/dL Final    BUN/Creatinine Ratio 04/19/2024 24 (H)  6 - 20 Final    Calcium 04/19/2024 9.3  8.5 - 10.1 mg/dL Final    Total Protein  04/19/2024 7.1  6.4 - 8.2 g/dL Final    Albumin 04/19/2024 3.6  3.5 - 5.0 g/dL Final    Globulin 04/19/2024 3.5  2.0 - 4.0 g/dL Final    A/G Ratio 04/19/2024 1.0   Final    Bilirubin, Total 04/19/2024 0.5  >0.0 - 1.2 mg/dL Final    Alk Phos 04/19/2024 67  55 - 142 U/L Final    ALT 04/19/2024 26  13 - 56 U/L Final    AST 04/19/2024 20  15 - 37 U/L Final    eGFR 04/19/2024 81  >=60 mL/min/1.73m2 Final    Triglycerides 04/19/2024 45  35 - 150 mg/dL Final    Cholesterol 04/19/2024 221 (H)  0 - 200 mg/dL Final    HDL Cholesterol 04/19/2024 85 (H)  40 - 60 mg/dL Final    Cholesterol/HDL Ratio (Risk Factor) 04/19/2024 2.6   Final    Non-HDL 04/19/2024 136  mg/dL Final    LDL Calculated 04/19/2024 127  mg/dL Final    LDL/HDL 04/19/2024 1.5   Final    VLDL 04/19/2024 9  mg/dL Final    Hemoglobin A1C 04/19/2024 5.3  4.5 - 6.6 % Final    Estimated Average Glucose 04/19/2024 105  mg/dL Final    WBC 04/19/2024 8.03  4.50 - 11.00 K/uL Final    RBC 04/19/2024 3.66 (L)  4.20 - 5.40 M/uL Final    Hemoglobin 04/19/2024 11.2 (L)  12.0 - 16.0 g/dL Final    Hematocrit 04/19/2024 36.1 (L)  38.0 - 47.0 % Final    MCV 04/19/2024 98.6 (H)  80.0 - 96.0 fL Final    MCH 04/19/2024 30.6  27.0 - 31.0 pg Final    MCHC 04/19/2024 31.0 (L)  32.0 - 36.0 g/dL Final    RDW 04/19/2024 13.6  11.5 - 14.5 % Final    Platelet Count 04/19/2024 263  150 - 400 K/uL Final    MPV 04/19/2024 10.7  9.4 - 12.4 fL Final    Neutrophils % 04/19/2024 53.9  53.0 - 65.0 % Final    Lymphocytes % 04/19/2024 34.5  27.0 - 41.0 % Final    Monocytes % 04/19/2024 8.6 (H)  2.0 - 6.0 % Final    Eosinophils % 04/19/2024 2.2  1.0 - 4.0 % Final    Basophils % 04/19/2024 0.6  0.0 - 1.0 % Final    Immature Granulocytes % 04/19/2024 0.2  0.0 - 0.4 % Final    nRBC, Auto 04/19/2024 0.0  <=0.0 % Final    Neutrophils, Abs 04/19/2024 4.32  1.80 - 7.70 K/uL Final    Lymphocytes, Absolute 04/19/2024 2.77  1.00 - 4.80 K/uL Final    Monocytes, Absolute 04/19/2024 0.69  0.00 - 0.80 K/uL Final     Eosinophils, Absolute 04/19/2024 0.18  0.00 - 0.50 K/uL Final    Basophils, Absolute 04/19/2024 0.05  0.00 - 0.20 K/uL Final    Immature Granulocytes, Absolute 04/19/2024 0.02  0.00 - 0.04 K/uL Final    nRBC, Absolute 04/19/2024 0.00  <=0.00 x10e3/uL Final    Diff Type 04/19/2024 Auto   Final         No orders of the defined types were placed in this encounter.      Requested Prescriptions      No prescriptions requested or ordered in this encounter       Assessment:     1. Trochanteric bursitis of right hip    2. Spinal stenosis of lumbar region with neurogenic claudication    3. Postlaminectomy syndrome of lumbar region    4. Lumbosacral radiculopathy               MRI right hip [facility] March 14, 2024  No acute bony abnormality  Evidence of intramuscular partial tear of the gluteus medius tendon on the right just superior to the greater trochanter level    MRI lumbar spine [facility] January 18, 2024  Vertebral bodies are normal in height if you with previous lumbar fusion at L4-5 appearing unchanged. There is mild spondylolisthesis at this and.   Multiple level degenerative changes      Plan:    Not using narcotics from our office    Declines gabapentin/amitriptyline due to sedation    History of TLIF L4-5 Dr. Clark, October 2019    Follows orthopedics  Dr Elton Kent at [facility] in [place]    She had L4/5 CHRIS # 1, July 21, 2023, she states she had 100% relief after procedure, his procedure did help improve her level of function    She had lumbar L3/4 CHRIS # 2, November 21, 2023 she states she had 95% relief after procedure, the procedure did help improve her level function        Follow-up after lumbar L5/S1 CHRIS # 2, September 5, 2024  She had 99% relief after procedure,   procedure did help improve her level function      Today complaint right lateral thigh pain pointing to her right trochanteric area worse with sitting or lying on her right side requesting right trochanteric  injection    Denies loss of bowel or bladder function     She states she continues to be very active golfing 4 times a week    After discussing options will proceed with     Right trochanteric injection    Continue home exercise program as directed    Follow-up as needed, patient request    Dr. Delgado September 2025    Bring original prescription medication bottles/container/box with labels to each visit

## 2024-09-12 ENCOUNTER — PATIENT MESSAGE (OUTPATIENT)
Dept: PAIN MEDICINE | Facility: CLINIC | Age: 74
End: 2024-09-12
Payer: MEDICARE

## 2024-09-23 ENCOUNTER — OFFICE VISIT (OUTPATIENT)
Dept: SPINE | Facility: CLINIC | Age: 74
End: 2024-09-23
Payer: MEDICARE

## 2024-09-23 ENCOUNTER — OFFICE VISIT (OUTPATIENT)
Dept: PAIN MEDICINE | Facility: CLINIC | Age: 74
End: 2024-09-23
Payer: MEDICARE

## 2024-09-23 VITALS
SYSTOLIC BLOOD PRESSURE: 149 MMHG | WEIGHT: 148 LBS | HEIGHT: 61 IN | BODY MASS INDEX: 27.94 KG/M2 | HEART RATE: 70 BPM | DIASTOLIC BLOOD PRESSURE: 64 MMHG | RESPIRATION RATE: 18 BRPM

## 2024-09-23 DIAGNOSIS — M96.1 POSTLAMINECTOMY SYNDROME OF LUMBAR REGION: Chronic | ICD-10-CM

## 2024-09-23 DIAGNOSIS — M48.062 SPINAL STENOSIS OF LUMBAR REGION WITH NEUROGENIC CLAUDICATION: Chronic | ICD-10-CM

## 2024-09-23 DIAGNOSIS — M54.17 LUMBOSACRAL RADICULOPATHY: Chronic | ICD-10-CM

## 2024-09-23 DIAGNOSIS — M48.062 LUMBAR STENOSIS WITH NEUROGENIC CLAUDICATION: ICD-10-CM

## 2024-09-23 DIAGNOSIS — M70.61 TROCHANTERIC BURSITIS OF RIGHT HIP: Primary | Chronic | ICD-10-CM

## 2024-09-23 DIAGNOSIS — M54.16 LUMBAR RADICULOPATHY: ICD-10-CM

## 2024-09-23 DIAGNOSIS — M51.36 DDD (DEGENERATIVE DISC DISEASE), LUMBAR: Primary | ICD-10-CM

## 2024-09-23 PROCEDURE — 99999 PR PBB SHADOW E&M-EST. PATIENT-LVL III: CPT | Mod: PBBFAC,,, | Performed by: ORTHOPAEDIC SURGERY

## 2024-09-23 PROCEDURE — 20610 DRAIN/INJ JOINT/BURSA W/O US: CPT | Mod: PBBFAC | Performed by: PHYSICIAN ASSISTANT

## 2024-09-23 PROCEDURE — 99213 OFFICE O/P EST LOW 20 MIN: CPT | Mod: PBBFAC,25 | Performed by: ORTHOPAEDIC SURGERY

## 2024-09-23 PROCEDURE — 99214 OFFICE O/P EST MOD 30 MIN: CPT | Mod: S$PBB,25,, | Performed by: ORTHOPAEDIC SURGERY

## 2024-09-23 PROCEDURE — 99215 OFFICE O/P EST HI 40 MIN: CPT | Mod: PBBFAC | Performed by: PHYSICIAN ASSISTANT

## 2024-09-23 PROCEDURE — 99214 OFFICE O/P EST MOD 30 MIN: CPT | Mod: 25,S$PBB,, | Performed by: PHYSICIAN ASSISTANT

## 2024-09-23 PROCEDURE — 99999PBSHW PR PBB SHADOW TECHNICAL ONLY FILED TO HB: Mod: PBBFAC,,,

## 2024-09-23 PROCEDURE — 99999 PR PBB SHADOW E&M-EST. PATIENT-LVL V: CPT | Mod: PBBFAC,,, | Performed by: PHYSICIAN ASSISTANT

## 2024-09-23 RX ORDER — BUPIVACAINE HYDROCHLORIDE 2.5 MG/ML
2 INJECTION, SOLUTION EPIDURAL; INFILTRATION; INTRACAUDAL
Status: DISCONTINUED | OUTPATIENT
Start: 2024-09-23 | End: 2024-09-23 | Stop reason: HOSPADM

## 2024-09-23 RX ORDER — TRIAMCINOLONE ACETONIDE 40 MG/ML
40 INJECTION, SUSPENSION INTRA-ARTICULAR; INTRAMUSCULAR
Status: DISCONTINUED | OUTPATIENT
Start: 2024-09-23 | End: 2024-09-23 | Stop reason: HOSPADM

## 2024-09-23 RX ADMIN — BUPIVACAINE HYDROCHLORIDE 2 ML: 2.5 INJECTION, SOLUTION EPIDURAL; INFILTRATION; INTRACAUDAL at 02:09

## 2024-09-23 RX ADMIN — TRIAMCINOLONE ACETONIDE 40 MG: 40 INJECTION, SUSPENSION INTRA-ARTICULAR; INTRAMUSCULAR at 02:09

## 2024-09-23 NOTE — PROGRESS NOTES
MDM/time:  30-35 minutes spent on this encounter including 10 minutes reviewing imaging and notes, 15 minutes with the patient, 5 minutes documentation    ASSESSMENT:  73 y.o. female with lumbar spondylosis and radiculopathy, status post L4-5 TLIF October 2019, now with back pain and radiculopathy.  Right greater trochanteric bursitis    PLAN:  She is going to follow up with Dr. Delgado today and we will ask about a trochanteric injection.  Follow-up in 6 months    HPI:  73 y.o. female here for repeat evaluation of lumbar spondylosis and back pain.  She had worsening radicular complaints over the summer.  She had an injection with Dr. Delgado a couple weeks ago which seemed to have helped.  Complaining of some right lateral hip and thigh pain.  She had right hip pain and was diagnosed with a gluteus minimus tear which was fixed at [facility] in [place] with a hip scope on 09/09/2022.  Status post L4-5 TLIF October 2019. Reports that lately she has had worsening back pain as well as fatigue in her legs with prolonged standing walking and pain into the bilateral hips and buttocks.  Denies any recent injuries.      IMAGING:  X-rays lumbar spine reviewed show:  On the AP there is normal coronal alignment.  There are 5 non-rib-bearing lumbar vertebrae.  On the lateral there is maintained lumbar lordosis.  She is status post L4-5 laminectomy and fusion with interbody.  No signs of hardware loosening or failure.    MRI lumbar spine 05/19/2022 reviewed shows:  At L3-4 there is severe central bilateral lateral recess stenosis.  Moderate bilateral foraminal stenosis  At L4-5 there is prior TLIF with satisfactory decompression  At L5-S1 there is right paracentral disc protrusion with moderate right lateral recess stenosis and moderate bilateral foraminal stenosis, consistent with prior MRI    Past Medical History:   Diagnosis Date    Benign essential HTN     Carpal tunnel syndrome, bilateral upper limbs     Chronic low back  pain     Degeneration, intervertebral disc, lumbosacral     GERD (gastroesophageal reflux disease)     Hypercholesterolemia     Insomnia     Iron deficiency anemia     Localized swelling, mass and lump, right lower limb     Lower extremity edema     Lumbar spondylosis     Prediabetes     Restless leg     Slow transit constipation     Spinal stenosis, lumbar     Vitamin D deficiency      Past Surgical History:   Procedure Laterality Date    CATARACT EXTRACTION Left 09/10/2020    CATARACT EXTRACTION Right 10/01/2020    Dr. Stephen Fox    CHOLECYSTECTOMY  1990    COSMETIC SURGERY      eyelid    EPIDURAL STEROID INJECTION N/A 9/5/2024    Procedure: Injection, Steroid, Epidural, L5/S1;  Surgeon: Esther Delgado MD;  Location: [facility];  Service: Pain Management;  Laterality: N/A;    EPIDURAL STEROID INJECTION INTO LUMBAR SPINE N/A 07/21/2022    Procedure: Injection-steroid-epidural-lumbar  L3-L4  CHRIS  NO SEDATION;  Surgeon: Esther Delgado MD;  Location: [facility];  Service: Pain Management;  Laterality: N/A;  STATES HAD VAC  WILL BRING CARD    EPIDURAL STEROID INJECTION INTO LUMBAR SPINE Bilateral 03/16/2023    Procedure: L3-4 CHRIS;  Surgeon: Esther Delgado MD;  Location: [facility];  Service: Pain Management;  Laterality: Bilateral;  NO ANESTHESIA    EPIDURAL STEROID INJECTION INTO LUMBAR SPINE Bilateral 11/21/2023    Procedure: L3-4 CHRIS;  Surgeon: Esther Delgado MD;  Location: [facility];  Service: Pain Management;  Laterality: Bilateral;  no sedation    EPIDURAL STEROID INJECTION INTO LUMBAR SPINE Bilateral 4/4/2024    Procedure: L5-S1 CHRIS;  Surgeon: Esther Delgado MD;  Location: [facility];  Service: Pain Management;  Laterality: Bilateral;    HIP ARTHROPLASTY Left 10/2015    repair of torn gluteus medius muscle via hip arthroscopy at [facility] Dr. Yoli Lui    HIP ARTHROPLASTY Right 2023    repair of tendon at [facility]    LUMBAR  DISCECTOMY  2019    SHOULDER ARTHROSCOPY W/ ROTATOR CUFF REPAIR  2005    SINUS SURGERY  02/2011    TUBAL LIGATION      VASCULAR SURGERY  12/15/2017    R [facility] Laser Ablation per Dr. Herman Bullard     Social History     Tobacco Use    Smoking status: Never     Passive exposure: Never    Smokeless tobacco: Never   Substance Use Topics    Alcohol use: Yes     Alcohol/week: 4.0 standard drinks of alcohol     Types: 4 Glasses of wine per week     Comment: per week    Drug use: Never      Current Outpatient Medications   Medication Instructions    aspirin 81 mg, Oral, Daily    esomeprazole (NEXIUM) 20 mg, Oral, Before breakfast    losartan (COZAAR) 25 mg, Oral, Daily    meclizine (ANTIVERT) 25 mg, Oral, Every 8 hours PRN    metFORMIN (GLUCOPHAGE) 500 mg, Oral, 3 times daily    methylPREDNISolone (MEDROL DOSEPACK) 4 mg tablet use as directed    multivitamin (THERAGRAN) per tablet 1 tablet, Oral, Daily    pravastatin (PRAVACHOL) 10 mg, Oral, Nightly    pyridoxine (vitamin B6) (B-6) 50 mg, Oral, Daily    vitamin D (VITAMIN D3) 1,000 Units, Oral, Daily    zinc gluconate 50 mg, Oral, Daily        EXAM:  Constitutional  General Appearance:  There is no height or weight on file to calculate BMI., NAD  Psychiatric   Orientation: Oriented to time, oriented to place, oriented to person  Mood and Affect: Active and alert, normal mood, normal affect  Gait and Station   Appearance:  Normal gait, normal tandem gait, able to walk on toes, able to walk on heels  Tender to palpation right greater trochanter  Healed incision  5/5 strength  Sensation intact  2+ pulses

## 2024-09-23 NOTE — PROCEDURES
Large Joint Aspiration/Injection: R greater trochanteric bursa    Date/Time: 9/23/2024 2:30 PM    Performed by: Mark Muhammad PA  Authorized by: Mark Muhammad PA    Consent Done?:  Yes (Written)  Indications:  Arthritis and pain  Site marked: the procedure site was marked    Timeout: prior to procedure the correct patient, procedure, and site was verified    Prep: patient was prepped and draped in usual sterile fashion      Details:  Needle Size:  22 G  Approach:  Lateral  Location:  Hip  Site:  R greater trochanteric bursa  Medications:  2 mL BUPivacaine (PF) 0.25% (2.5 mg/ml) 0.25 % (2.5 mg/mL); 40 mg triamcinolone acetonide 40 mg/mL  Aspirate amount (mL):  0  Patient tolerance:  Patient tolerated the procedure well with no immediate complications     Bupivacaine 0.25% 25 mg/ 10 ml , 2 cc     Tolerated procedure well    No complication noted    Band-Aid was applied

## 2024-10-07 NOTE — PROGRESS NOTES
Clinic note     Patient name: Lynda Parr is a 73 y.o. female   Chief compliant   Chief Complaint   Patient presents with    Follow-up     Here for flu shot and wants to discuss cholesterol meds.        Subjective     History of present illness   In clinic for routine follow up, lab and influenza vaccine   She is currently holding pravastatin to see if muscle cramps in lower extremities would improve, they have no improved off statin   Will repeat lipid panel today and she will restart pravastatin if indicated     Past Medical History: HTN, HLD, ID anemia, GERD, prediabetes, RLS, insomnia       Last A1c 5.3    Pain management Dr Delgado   GYN: Dr Portillo  Spine clinic: Dr JUNI Clark   Hematology/oncology: Dr Blunt, [facility]; record reviewed  Neurology: Dr Morgan, appointment scheduled in November               Social History     Tobacco Use    Smoking status: Never     Passive exposure: Never    Smokeless tobacco: Never   Substance Use Topics    Alcohol use: Yes     Alcohol/week: 4.0 standard drinks of alcohol     Types: 4 Glasses of wine per week     Comment: per week    Drug use: Never       Review of patient's allergies indicates:   Allergen Reactions    Ace inhibitors Other (See Comments)       Past Medical History:   Diagnosis Date    Benign essential HTN     Carpal tunnel syndrome, bilateral upper limbs     Chronic low back pain     Degeneration, intervertebral disc, lumbosacral     GERD (gastroesophageal reflux disease)     Hypercholesterolemia     Insomnia     Iron deficiency anemia     Localized swelling, mass and lump, right lower limb     Lower extremity edema     Lumbar spondylosis     Prediabetes     Restless leg     Slow transit constipation     Spinal stenosis, lumbar     Vitamin D deficiency        Past Surgical History:   Procedure Laterality Date    CATARACT EXTRACTION Left 09/10/2020    CATARACT EXTRACTION Right 10/01/2020    Dr. Stephen Fox    CHOLECYSTECTOMY  1990     COSMETIC SURGERY      eyelid    EPIDURAL STEROID INJECTION N/A 2024    Procedure: Injection, Steroid, Epidural, L5/S1;  Surgeon: Esther Delgado MD;  Location: [facility];  Service: Pain Management;  Laterality: N/A;    EPIDURAL STEROID INJECTION INTO LUMBAR SPINE N/A 2022    Procedure: Injection-steroid-epidural-lumbar  L3-L4  CHRIS  NO SEDATION;  Surgeon: Esther Delgado MD;  Location: [facility];  Service: Pain Management;  Laterality: N/A;  STATES HAD VAC  WILL BRING CARD    EPIDURAL STEROID INJECTION INTO LUMBAR SPINE Bilateral 2023    Procedure: L3-4 CHRIS;  Surgeon: Esther Delgado MD;  Location: [facility];  Service: Pain Management;  Laterality: Bilateral;  NO ANESTHESIA    EPIDURAL STEROID INJECTION INTO LUMBAR SPINE Bilateral 2023    Procedure: L3-4 CHRIS;  Surgeon: Esther Delgado MD;  Location: [facility];  Service: Pain Management;  Laterality: Bilateral;  no sedation    EPIDURAL STEROID INJECTION INTO LUMBAR SPINE Bilateral 2024    Procedure: L5-S1 CHRIS;  Surgeon: Esther Delgado MD;  Location: [facility];  Service: Pain Management;  Laterality: Bilateral;    HIP ARTHROPLASTY Left 10/2015    repair of torn gluteus medius muscle via hip arthroscopy at [facility] Dr. Yoli Lui    HIP ARTHROPLASTY Right     repair of tendon at [facility]    LUMBAR DISCECTOMY  2019    SHOULDER ARTHROSCOPY W/ ROTATOR CUFF REPAIR  2005    SINUS SURGERY  2011    TUBAL LIGATION      VASCULAR SURGERY  12/15/2017    [facility] per Dr. Herman Bullard        Family History   Problem Relation Name Age of Onset    Hypertension Mother Mom             Coronary artery disease Father Dad     Diabetes Father Dad             Heart disease Father Dad             Pulmonary embolism Brother      No Known Problems Son           Current Outpatient Medications:     aspirin 81 MG Chew, Take 81 mg by mouth once  daily., Disp: , Rfl:     esomeprazole (NEXIUM) 20 MG capsule, Take 1 capsule (20 mg total) by mouth before breakfast., Disp: 90 capsule, Rfl: 0    losartan (COZAAR) 25 MG tablet, Take 1 tablet (25 mg total) by mouth once daily., Disp: 90 tablet, Rfl: 0    metFORMIN (GLUCOPHAGE) 500 MG tablet, Take 1 tablet (500 mg total) by mouth 3 (three) times daily., Disp: 270 tablet, Rfl: 0    multivitamin (THERAGRAN) per tablet, Take 1 tablet by mouth once daily., Disp: , Rfl:     pravastatin (PRAVACHOL) 10 MG tablet, Take 1 tablet (10 mg total) by mouth every evening., Disp: 90 tablet, Rfl: 0    pyridoxine, vitamin B6, (B-6) 100 MG Tab, Take 50 mg by mouth once daily., Disp: , Rfl:     vitamin D (VITAMIN D3) 1000 units Tab, Take 1,000 Units by mouth once daily., Disp: , Rfl:     zinc gluconate 50 mg tablet, Take 50 mg by mouth once daily., Disp: , Rfl:     meclizine (ANTIVERT) 25 mg tablet, Take 1 tablet (25 mg total) by mouth every 8 (eight) hours as needed for Dizziness., Disp: 30 tablet, Rfl: 1    methylPREDNISolone (MEDROL DOSEPACK) 4 mg tablet, use as directed (Patient not taking: Reported on 9/23/2024), Disp: 21 tablet, Rfl: 0  No current facility-administered medications for this visit.    Review of Systems   Constitutional:  Negative for activity change, appetite change, chills, fatigue, fever and unexpected weight change.   HENT:  Negative for hearing loss, rhinorrhea and trouble swallowing.    Eyes:  Negative for discharge and visual disturbance.   Respiratory:  Negative for cough, chest tightness, shortness of breath and wheezing.    Cardiovascular:  Negative for chest pain, palpitations and leg swelling.   Gastrointestinal:  Negative for abdominal pain, blood in stool, change in bowel habit, constipation, diarrhea, nausea and vomiting.   Endocrine: Negative for polydipsia and polyuria.   Genitourinary:  Negative for difficulty urinating, dysuria, frequency, hematuria and menstrual problem.   Musculoskeletal:   "Negative for arthralgias, gait problem, joint swelling, myalgias and neck pain.   Neurological:  Negative for dizziness, syncope, weakness, light-headedness and headaches.   Psychiatric/Behavioral:  Negative for confusion, dysphoric mood and sleep disturbance. The patient is not nervous/anxious.        Objective     BP (!) 144/76 (BP Location: Right arm, Patient Position: Sitting)   Pulse 90   Ht 5' 1" (1.549 m)   Wt 66.5 kg (146 lb 9.6 oz)   SpO2 97%   BMI 27.70 kg/m²     Physical Exam   Constitutional: She is oriented to person, place, and time. No distress.   HENT:   Head: Atraumatic.   Mouth/Throat: Mucous membranes are moist.   Cardiovascular: Normal rate and regular rhythm. Pulmonary:      Effort: Pulmonary effort is normal. No respiratory distress.      Breath sounds: Normal breath sounds. No wheezing, rhonchi or rales.     Musculoskeletal:         General: Normal range of motion.      Cervical back: Neck supple.      Right lower leg: No edema.      Left lower leg: No edema.   Neurological: She is alert and oriented to person, place, and time. Gait normal.   Skin: Skin is warm and dry.   Psychiatric: Her behavior is normal. Mood normal.       Lab Results   Component Value Date    WBC 6.08 06/06/2024    HGB 10.9 (L) 06/06/2024    HCT 34.7 (L) 06/06/2024    MCV 97.5 (H) 06/06/2024     06/06/2024       CMP  Sodium   Date Value Ref Range Status   04/19/2024 143 136 - 145 mmol/L Final     Potassium   Date Value Ref Range Status   04/19/2024 4.5 3.5 - 5.1 mmol/L Final     Chloride   Date Value Ref Range Status   04/19/2024 112 (H) 98 - 107 mmol/L Final     CO2   Date Value Ref Range Status   04/19/2024 27 21 - 32 mmol/L Final     Glucose   Date Value Ref Range Status   04/19/2024 101 74 - 106 mg/dL Final     BUN   Date Value Ref Range Status   04/19/2024 19 (H) 7 - 18 mg/dL Final     Creatinine   Date Value Ref Range Status   04/19/2024 0.78 0.55 - 1.02 mg/dL Final     Calcium   Date Value Ref Range " Status   04/19/2024 9.3 8.5 - 10.1 mg/dL Final     Total Protein   Date Value Ref Range Status   04/19/2024 7.1 6.4 - 8.2 g/dL Final     Albumin   Date Value Ref Range Status   04/19/2024 3.6 3.5 - 5.0 g/dL Final     Bilirubin, Total   Date Value Ref Range Status   04/19/2024 0.5 >0.0 - 1.2 mg/dL Final     Alk Phos   Date Value Ref Range Status   04/19/2024 67 55 - 142 U/L Final     AST   Date Value Ref Range Status   04/19/2024 20 15 - 37 U/L Final     ALT   Date Value Ref Range Status   04/19/2024 26 13 - 56 U/L Final     Anion Gap   Date Value Ref Range Status   04/19/2024 9 7 - 16 mmol/L Final     eGFR   Date Value Ref Range Status   05/18/2022 75 >=60 mL/min/1.73m² Final     Lab Results   Component Value Date    TSH 2.210 10/04/2021     Lab Results   Component Value Date    CHOL 221 (H) 04/19/2024    CHOL 250 (H) 10/12/2023    CHOL 206 (H) 10/05/2022     Lab Results   Component Value Date    HDL 85 (H) 04/19/2024    HDL 76 (H) 10/12/2023    HDL 87 (H) 10/05/2022     Lab Results   Component Value Date    LDLCALC 127 04/19/2024    LDLCALC 157 10/12/2023    LDLCALC 107 10/05/2022     Lab Results   Component Value Date    TRIG 45 04/19/2024    TRIG 83 10/12/2023    TRIG 61 10/05/2022     Lab Results   Component Value Date    CHOLHDL 2.6 04/19/2024    CHOLHDL 3.3 10/12/2023    CHOLHDL 2.4 10/05/2022     Lab Results   Component Value Date    HGBA1C 5.3 04/19/2024         Assessment and Plan   Benign essential HTN    Prediabetes  -     Hemoglobin A1C; Future; Expected date: 10/08/2024    Hypercholesteremia  -     Lipid Panel; Future; Expected date: 10/08/2024    Flu vaccine need  -     influenza (adjuvanted) (Fluad) 45 mcg/0.5 mL IM vaccine (> or = 64 yo) 0.5 mL          Patient Instructions  Patient Instructions   Lab orders placed, we will notify you of results when available

## 2024-10-08 ENCOUNTER — OFFICE VISIT (OUTPATIENT)
Dept: FAMILY MEDICINE | Facility: CLINIC | Age: 74
End: 2024-10-08
Payer: MEDICARE

## 2024-10-08 VITALS
OXYGEN SATURATION: 97 % | BODY MASS INDEX: 27.68 KG/M2 | WEIGHT: 146.63 LBS | DIASTOLIC BLOOD PRESSURE: 76 MMHG | SYSTOLIC BLOOD PRESSURE: 144 MMHG | HEIGHT: 61 IN | HEART RATE: 90 BPM

## 2024-10-08 DIAGNOSIS — Z23 FLU VACCINE NEED: ICD-10-CM

## 2024-10-08 DIAGNOSIS — E78.00 HYPERCHOLESTEREMIA: ICD-10-CM

## 2024-10-08 DIAGNOSIS — R73.03 PREDIABETES: ICD-10-CM

## 2024-10-08 DIAGNOSIS — I10 BENIGN ESSENTIAL HTN: Primary | ICD-10-CM

## 2024-10-08 PROCEDURE — 99213 OFFICE O/P EST LOW 20 MIN: CPT | Mod: ,,, | Performed by: NURSE PRACTITIONER

## 2024-10-08 PROCEDURE — G0008 ADMIN INFLUENZA VIRUS VAC: HCPCS | Mod: ,,, | Performed by: NURSE PRACTITIONER

## 2024-10-08 PROCEDURE — 90653 IIV ADJUVANT VACCINE IM: CPT | Mod: ,,, | Performed by: NURSE PRACTITIONER

## 2024-10-14 ENCOUNTER — PATIENT MESSAGE (OUTPATIENT)
Dept: FAMILY MEDICINE | Facility: CLINIC | Age: 74
End: 2024-10-14
Payer: MEDICARE

## 2024-10-14 ENCOUNTER — LAB VISIT (OUTPATIENT)
Dept: LAB | Facility: HOSPITAL | Age: 74
End: 2024-10-14
Attending: NURSE PRACTITIONER
Payer: MEDICARE

## 2024-10-14 DIAGNOSIS — R73.03 PREDIABETES: ICD-10-CM

## 2024-10-14 DIAGNOSIS — E78.00 HYPERCHOLESTEREMIA: ICD-10-CM

## 2024-10-14 LAB
CHOLEST SERPL-MCNC: 252 MG/DL (ref 0–200)
CHOLEST/HDLC SERPL: 3 {RATIO}
EST. AVERAGE GLUCOSE BLD GHB EST-MCNC: 105 MG/DL
HBA1C MFR BLD HPLC: 5.3 % (ref 4.5–6.6)
HDLC SERPL-MCNC: 85 MG/DL (ref 40–60)
LDLC SERPL CALC-MCNC: 149 MG/DL
LDLC/HDLC SERPL: 1.8 {RATIO}
NONHDLC SERPL-MCNC: 167 MG/DL
TRIGL SERPL-MCNC: 89 MG/DL (ref 35–150)
VLDLC SERPL-MCNC: 18 MG/DL

## 2024-10-14 PROCEDURE — 80061 LIPID PANEL: CPT

## 2024-10-14 PROCEDURE — 36415 COLL VENOUS BLD VENIPUNCTURE: CPT

## 2024-10-14 PROCEDURE — 83036 HEMOGLOBIN GLYCOSYLATED A1C: CPT

## 2024-10-15 DIAGNOSIS — E78.00 HYPERCHOLESTEREMIA: Primary | ICD-10-CM

## 2024-10-15 RX ORDER — EZETIMIBE 10 MG/1
10 TABLET ORAL DAILY
Qty: 90 TABLET | Refills: 1 | Status: SHIPPED | OUTPATIENT
Start: 2024-10-15

## 2024-11-19 ENCOUNTER — PATIENT MESSAGE (OUTPATIENT)
Dept: ADMINISTRATIVE | Facility: HOSPITAL | Age: 74
End: 2024-11-19

## 2024-11-20 ENCOUNTER — PATIENT OUTREACH (OUTPATIENT)
Facility: HOSPITAL | Age: 74
End: 2024-11-20
Payer: MEDICARE

## 2024-11-20 ENCOUNTER — PATIENT MESSAGE (OUTPATIENT)
Dept: FAMILY MEDICINE | Facility: CLINIC | Age: 74
End: 2024-11-20
Payer: MEDICARE

## 2024-11-20 DIAGNOSIS — Z78.0 POSTMENOPAUSAL STATUS: Primary | ICD-10-CM

## 2024-11-20 NOTE — PROGRESS NOTES
Population Health Chart Review & Patient Outreach Details    Updates Requested / Reviewed:  [x]  Care Team Updated  []  Care Everywhere Updated & Reviewed  []  [facility] Reviewed  []   Reviewed  []  MIIX Reviewed    Health Maintenance Topics Addressed and Outreach Outcomes / Actions Taken:          Osteoporosis Screening [x]  Dexa Order Placed    [x]  Dexa Appointment Scheduled    []  External Records Requested & Care Team Updated    []  External Records Uploaded, Care Team Updated, & History Updated if Applicable    []  Patient Declined Scheduling Dexa or Will Call Back to Schedule    []  Patient Will Schedule with External Provider / Order Routed & Care Team Updated if Applicable

## 2024-11-25 ENCOUNTER — PATIENT OUTREACH (OUTPATIENT)
Facility: HOSPITAL | Age: 74
End: 2024-11-25
Payer: MEDICARE

## 2024-11-25 NOTE — PROGRESS NOTES
Osteoporosis Screening []  Dexa Order Placed    [x]  Dexa Appointment Scheduled    []  External Records Requested & Care Team Updated    []  External Records Uploaded, Care Team Updated, & History Updated if Applicable    []  Patient Declined Scheduling Dexa or Will Call Back to Schedule    []  Patient Will Schedule with External Provider / Order Routed & Care Team Updated if Applicable     Chart review to check on DEXA being scheduled. I called [facility] because it has not been scheduled yet and got it set up for her on 12/05/2024 at 140

## 2025-01-21 DIAGNOSIS — R73.03 PREDIABETES: Chronic | ICD-10-CM

## 2025-01-22 RX ORDER — METFORMIN HYDROCHLORIDE 500 MG/1
500 TABLET ORAL 3 TIMES DAILY
Qty: 270 TABLET | Refills: 0 | Status: SHIPPED | OUTPATIENT
Start: 2025-01-22

## 2025-01-27 ENCOUNTER — HOSPITAL ENCOUNTER (OUTPATIENT)
Dept: RADIOLOGY | Facility: HOSPITAL | Age: 75
Discharge: HOME | End: 2025-01-27
Attending: NURSE PRACTITIONER
Payer: MEDICARE

## 2025-01-27 ENCOUNTER — OFFICE VISIT (OUTPATIENT)
Dept: FAMILY MEDICINE | Facility: CLINIC | Age: 75
End: 2025-01-27
Payer: MEDICARE

## 2025-01-27 VITALS
HEIGHT: 61 IN | HEART RATE: 76 BPM | RESPIRATION RATE: 14 BRPM | DIASTOLIC BLOOD PRESSURE: 80 MMHG | SYSTOLIC BLOOD PRESSURE: 136 MMHG | OXYGEN SATURATION: 99 % | BODY MASS INDEX: 28.72 KG/M2 | WEIGHT: 152.13 LBS

## 2025-01-27 DIAGNOSIS — R06.09 DYSPNEA ON EXERTION: Primary | ICD-10-CM

## 2025-01-27 DIAGNOSIS — I10 BENIGN ESSENTIAL HTN: Chronic | ICD-10-CM

## 2025-01-27 DIAGNOSIS — R73.03 PREDIABETES: Chronic | ICD-10-CM

## 2025-01-27 DIAGNOSIS — D50.9 IRON DEFICIENCY ANEMIA, UNSPECIFIED IRON DEFICIENCY ANEMIA TYPE: Chronic | ICD-10-CM

## 2025-01-27 DIAGNOSIS — R06.09 DYSPNEA ON EXERTION: ICD-10-CM

## 2025-01-27 PROCEDURE — 99212 OFFICE O/P EST SF 10 MIN: CPT | Mod: ,,, | Performed by: NURSE PRACTITIONER

## 2025-01-27 PROCEDURE — 71046 X-RAY EXAM CHEST 2 VIEWS: CPT | Mod: TC

## 2025-01-27 RX ORDER — FLUTICASONE PROPIONATE 50 MCG
1 SPRAY, SUSPENSION (ML) NASAL DAILY PRN
COMMUNITY
Start: 2024-04-17

## 2025-01-27 RX ORDER — ROPINIROLE 0.5 MG/1
1 TABLET, FILM COATED ORAL NIGHTLY
COMMUNITY
Start: 2024-11-18

## 2025-01-27 NOTE — PATIENT INSTRUCTIONS
Chest xray and EKG  obtained, will notify of results when available   Will consider appropriate referrals after review of chest xray and EKG results     ED for worsening shortness of breath, chest pain, dizziness, palpitations

## 2025-01-27 NOTE — PROGRESS NOTES
Clinic note     Patient name: Lynda Parr is a 74 y.o. female   Chief compliant   Chief Complaint   Patient presents with    Shortness of Breath     Pt c/o shortness of breath with exertion off and on for several years. Pt also states she has a roaring in her right ear off and on.       Subjective     History of present illness   In clinic for evaluation of worsening dyspnea with exertion over the last few months, she states she has had a few episodes of shortness of breath while at rest, denies any chest pain or palpitations   Denies any cough, fever or recent acute illness   Family history of Afib and CAD  Monitors home blood pressure, heart rate and o2 sat with [employer] ring and blood pressure monitor, she states all readings have been normal   Request referral to Dr Ryao Rojas, cardiology, [place],  if cardiac referral is indicated    Past Medical History: HTN, HLD, ID anemia, GERD, prediabetes, RLS, insomnia       Last A1c 5.3     Pain management Dr Delgado   GYN: Dr Portillo  Spine clinic: Dr JUNI Clark   Hematology/oncology: Dr Blunt, [facility] [place] [facility]; record reviewed  Neurology: Dr Morgan          Shortness of Breath  This is a recurrent problem. The current episode started in the past 7 days. The problem occurs daily. The problem has been waxing and waning. The average episode lasts 10 minutes. Pertinent negatives include no abdominal pain, chest pain, claudication, coryza, ear pain, fever, headaches, hemoptysis, leg pain, leg swelling, neck pain, orthopnea, PND, rash, rhinorrhea, sore throat, sputum production, swollen glands, syncope, vomiting or wheezing. The symptoms are aggravated by exercise and any activity. The patient has no known risk factors for DVT/PE. She has tried body position changes and rest for the symptoms. The treatment provided mild relief. Her past medical history is significant for allergies and pneumonia. There is no history of aspirin allergies, asthma,  bronchiolitis, CAD, chronic lung disease, COPD, DVT, a heart failure, PE or a recent surgery.       Social History     Tobacco Use    Smoking status: Never     Passive exposure: Never    Smokeless tobacco: Never   Substance Use Topics    Alcohol use: Yes     Alcohol/week: 4.0 standard drinks of alcohol     Types: 4 Glasses of wine per week     Comment: per week    Drug use: Never       Review of patient's allergies indicates:   Allergen Reactions    Ace inhibitors Other (See Comments)    Lisinopril Itching     ACE INHIBITORS       Past Medical History:   Diagnosis Date    Benign essential HTN     Carpal tunnel syndrome, bilateral upper limbs     Chronic low back pain     Degeneration, intervertebral disc, lumbosacral     GERD (gastroesophageal reflux disease)     Hypercholesterolemia     Insomnia     Iron deficiency anemia     Localized swelling, mass and lump, right lower limb     Lower extremity edema     Lumbar spondylosis     Prediabetes     Restless leg     Slow transit constipation     Spinal stenosis, lumbar     Vitamin D deficiency        Past Surgical History:   Procedure Laterality Date    CATARACT EXTRACTION Left 09/10/2020    CATARACT EXTRACTION Right 10/01/2020    Dr. Stephen Fox    CHOLECYSTECTOMY  1990    COSMETIC SURGERY      eyelid    EPIDURAL STEROID INJECTION N/A 9/5/2024    Procedure: Injection, Steroid, Epidural, L5/S1;  Surgeon: Esther Delgado MD;  Location: [facility];  Service: Pain Management;  Laterality: N/A;    EPIDURAL STEROID INJECTION INTO LUMBAR SPINE N/A 07/21/2022    Procedure: Injection-steroid-epidural-lumbar  L3-L4  CHRIS  NO SEDATION;  Surgeon: Esther Delgado MD;  Location: [facility];  Service: Pain Management;  Laterality: N/A;  STATES HAD VAC  WILL BRING CARD    EPIDURAL STEROID INJECTION INTO LUMBAR SPINE Bilateral 03/16/2023    Procedure: L3-4 CHRIS;  Surgeon: Esther Delgado MD;  Location: [facility];  Service: Pain Management;  Laterality:  Bilateral;  NO ANESTHESIA    EPIDURAL STEROID INJECTION INTO LUMBAR SPINE Bilateral 2023    Procedure: L3-4 CHRIS;  Surgeon: Esther Delgado MD;  Location: [facility];  Service: Pain Management;  Laterality: Bilateral;  no sedation    EPIDURAL STEROID INJECTION INTO LUMBAR SPINE Bilateral 2024    Procedure: L5-S1 CHRIS;  Surgeon: Esther Delgado MD;  Location: [facility];  Service: Pain Management;  Laterality: Bilateral;    HIP ARTHROPLASTY Left 10/2015    repair of torn gluteus medius muscle via hip arthroscopy at [facility], Dr. Kent    HIP ARTHROPLASTY Right     repair of tendon at [facility]    LUMBAR DISCECTOMY  2019    SHOULDER ARTHROSCOPY W/ ROTATOR CUFF REPAIR  2005    SINUS SURGERY  2011    TUBAL LIGATION      VASCULAR SURGERY  12/15/2017    R [facility] Ablation per Dr. Herman Bullard        Family History   Problem Relation Name Age of Onset    Hypertension Mother Mom             Coronary artery disease Father Dad     Diabetes Father Dad             Heart disease Father Dad             Pulmonary embolism Brother      Atrial fibrillation Maternal Grandmother      No Known Problems Son           Current Outpatient Medications:     fluticasone propionate (FLONASE) 50 mcg/actuation nasal spray, 1 spray by Each Nostril route daily as needed for Rhinitis., Disp: , Rfl:     rOPINIRole (REQUIP) 0.5 MG tablet, Take 1 mg by mouth every evening., Disp: , Rfl:     aspirin 81 MG Chew, Take 81 mg by mouth once daily., Disp: , Rfl:     esomeprazole (NEXIUM) 20 MG capsule, Take 1 capsule (20 mg total) by mouth before breakfast., Disp: 90 capsule, Rfl: 0    ezetimibe (ZETIA) 10 mg tablet, Take 1 tablet (10 mg total) by mouth once daily., Disp: 90 tablet, Rfl: 1    losartan (COZAAR) 25 MG tablet, Take 1 tablet (25 mg total) by mouth once daily., Disp: 90 tablet, Rfl: 0    metFORMIN (GLUCOPHAGE) 500 MG tablet, Take 1 tablet (500 mg total) by  "mouth 3 (three) times daily., Disp: 270 tablet, Rfl: 0    multivitamin (THERAGRAN) per tablet, Take 1 tablet by mouth once daily., Disp: , Rfl:     pyridoxine, vitamin B6, (B-6) 100 MG Tab, Take 50 mg by mouth once daily., Disp: , Rfl:     vitamin D (VITAMIN D3) 1000 units Tab, Take 1,000 Units by mouth once daily., Disp: , Rfl:     zinc gluconate 50 mg tablet, Take 50 mg by mouth once daily., Disp: , Rfl:     Review of Systems   Constitutional:  Negative for appetite change, chills, fatigue, fever and unexpected weight change.   HENT:  Positive for tinnitus (right ear intermittent). Negative for ear pain, rhinorrhea and sore throat.    Respiratory:  Positive for shortness of breath (dyspnea with exertion). Negative for cough, hemoptysis, sputum production and wheezing.    Cardiovascular:  Negative for chest pain, palpitations, orthopnea, claudication, leg swelling, syncope and PND.   Gastrointestinal:  Negative for abdominal pain, change in bowel habit, constipation, diarrhea, nausea and vomiting.   Genitourinary:  Negative for dysuria and frequency.   Musculoskeletal:  Negative for arthralgias, gait problem, leg pain, myalgias and neck pain.   Integumentary:  Negative for rash.   Neurological:  Negative for dizziness, syncope, light-headedness and headaches.   Psychiatric/Behavioral:  Negative for dysphoric mood and sleep disturbance. The patient is not nervous/anxious.        Objective     /80 (Patient Position: Sitting)   Pulse 76   Resp 14   Ht 5' 1" (1.549 m)   Wt 69 kg (152 lb 1.6 oz)   SpO2 99%   BMI 28.74 kg/m²     Physical Exam   Constitutional: She is oriented to person, place, and time. No distress.   HENT:   Head: Atraumatic.   Right Ear: Hearing, tympanic membrane and ear canal normal.   Left Ear: Hearing, tympanic membrane and ear canal normal.   Mouth/Throat: Mucous membranes are moist.   Cardiovascular: Normal rate and regular rhythm. Pulmonary:      Effort: Pulmonary effort is normal. " No respiratory distress.      Breath sounds: Normal breath sounds. No wheezing, rhonchi or rales.     Abdominal: Soft. Bowel sounds are normal. She exhibits no distension. There is no abdominal tenderness.   Musculoskeletal:         General: Normal range of motion.      Cervical back: Neck supple.      Right lower leg: No edema.      Left lower leg: No edema.   Neurological: She is alert and oriented to person, place, and time. Gait normal.   Skin: Skin is warm and dry.   Psychiatric: Her behavior is normal. Mood normal.       Lab Results   Component Value Date    WBC 6.08 06/06/2024    HGB 10.9 (L) 06/06/2024    HCT 34.7 (L) 06/06/2024    MCV 97.5 (H) 06/06/2024     06/06/2024       CMP  Sodium   Date Value Ref Range Status   04/19/2024 143 136 - 145 mmol/L Final     Potassium   Date Value Ref Range Status   04/19/2024 4.5 3.5 - 5.1 mmol/L Final     Chloride   Date Value Ref Range Status   04/19/2024 112 (H) 98 - 107 mmol/L Final     CO2   Date Value Ref Range Status   04/19/2024 27 21 - 32 mmol/L Final     Glucose   Date Value Ref Range Status   04/19/2024 101 74 - 106 mg/dL Final     BUN   Date Value Ref Range Status   04/19/2024 19 (H) 7 - 18 mg/dL Final     Creatinine   Date Value Ref Range Status   04/19/2024 0.78 0.55 - 1.02 mg/dL Final     Calcium   Date Value Ref Range Status   11/18/2024 9.4 8.7 - 10.3 mg/dL Final     Total Protein   Date Value Ref Range Status   04/19/2024 7.1 6.4 - 8.2 g/dL Final     Albumin   Date Value Ref Range Status   04/19/2024 3.6 3.5 - 5.0 g/dL Final     Bilirubin, Total   Date Value Ref Range Status   04/19/2024 0.5 >0.0 - 1.2 mg/dL Final     Alk Phos   Date Value Ref Range Status   04/19/2024 67 55 - 142 U/L Final     AST   Date Value Ref Range Status   04/19/2024 20 15 - 37 U/L Final     ALT   Date Value Ref Range Status   04/19/2024 26 13 - 56 U/L Final     Anion Gap   Date Value Ref Range Status   04/19/2024 9 7 - 16 mmol/L Final     eGFR   Date Value Ref Range Status    05/18/2022 75 >=60 mL/min/1.73m² Final     Lab Results   Component Value Date    TSH 2.24 11/18/2024     Lab Results   Component Value Date    CHOL 252 (H) 10/14/2024    CHOL 221 (H) 04/19/2024    CHOL 250 (H) 10/12/2023     Lab Results   Component Value Date    HDL 85 (H) 10/14/2024    HDL 85 (H) 04/19/2024    HDL 76 (H) 10/12/2023     Lab Results   Component Value Date    LDLCALC 149 10/14/2024    LDLCALC 127 04/19/2024    LDLCALC 157 10/12/2023     Lab Results   Component Value Date    TRIG 89 10/14/2024    TRIG 45 04/19/2024    TRIG 83 10/12/2023     Lab Results   Component Value Date    CHOLHDL 3.0 10/14/2024    CHOLHDL 2.6 04/19/2024    CHOLHDL 3.3 10/12/2023     Lab Results   Component Value Date    HGBA1C 5.7 (H) 11/18/2024     EXAMINATION:  XR CHEST PA AND LATERAL     CLINICAL HISTORY:  Pneumonia, unspecified organism     COMPARISON:  Chest x-ray September 7, 2023     TECHNIQUE:  Frontal and lateral views of the chest.     FINDINGS:  Some interval improvement in bilateral mid to lower lung opacification with some residual within the lower lungs.  Recommend continued follow-up to document resolution.  The cardiomediastinal silhouette is stable in configuration.  Visualized osseous and surrounding soft tissue structures appear grossly unchanged.     Impression:     Some interval improvement.     Point of Service: [facility]        Electronically signed by:Yasir Portillo  Date:                                            09/25/2023  Time:                                           12:12        Exam Ended: 09/25/23 12:09 CDT   EXAMINATION:  CT CHEST WITH CONTRAST     CLINICAL HISTORY:  Solitary pulmonary noduleAbnormal xray - lung nodule, >= 1 cm;     TECHNIQUE:  Multiplanar CT of the chest with intravenous contrast.  100 cc of Isovue 370.     COMPARISON:  9/7/23     FINDINGS:  Lower neck: Normal     Lungs/airway: Multifocal patchy airspace opacities and ground-glass opacities located within the  right middle lobe and the lower lobes of the lungs, probably infectious/inflammatory in etiology.     Mediastinum: Normal     Upper abdomen: Mildly complex cyst within the left hepatic lobe measuring up to 3.1 cm.     Chest wall: Normal     Bones: Multilevel degenerative change     Impression:     No solid mass or concerning pulmonary nodules.     Multifocal patchy airspace opacities and ground-glass opacities located within the right middle lobe and the lower lobes of the lungs, probably infectious/inflammatory in etiology.        Electronically signed by:Kodak Liu  Date:                                            09/07/2023  Time:                                           12:05    Assessment and Plan   Dyspnea on exertion  -     X-Ray Chest PA And Lateral; Future; Expected date: 01/27/2025  -     EKG 12-lead; Future    Benign essential HTN  -     X-Ray Chest PA And Lateral; Future; Expected date: 01/27/2025  -     EKG 12-lead; Future    Iron deficiency anemia, unspecified iron deficiency anemia type  Comments:  Hematology: Dr Blunt    Prediabetes          Patient Instructions  Patient Instructions   Chest xray and EKG  obtained, will notify of results when available   Will consider appropriate referrals after review of chest xray and EKG results     ED for worsening shortness of breath, chest pain, dizziness, palpitations

## 2025-01-28 ENCOUNTER — PATIENT MESSAGE (OUTPATIENT)
Dept: FAMILY MEDICINE | Facility: CLINIC | Age: 75
End: 2025-01-28
Payer: MEDICARE

## 2025-01-29 DIAGNOSIS — R06.09 DYSPNEA ON EXERTION: Primary | ICD-10-CM

## 2025-01-29 DIAGNOSIS — I10 BENIGN ESSENTIAL HTN: ICD-10-CM

## 2025-02-07 ENCOUNTER — PATIENT MESSAGE (OUTPATIENT)
Dept: FAMILY MEDICINE | Facility: CLINIC | Age: 75
End: 2025-02-07
Payer: MEDICARE

## 2025-02-10 ENCOUNTER — PATIENT MESSAGE (OUTPATIENT)
Dept: FAMILY MEDICINE | Facility: CLINIC | Age: 75
End: 2025-02-10
Payer: MEDICARE

## 2025-02-11 RX ORDER — CICLOPIROX 80 MG/ML
SOLUTION TOPICAL NIGHTLY
Qty: 6.6 ML | Refills: 0 | Status: SHIPPED | OUTPATIENT
Start: 2025-02-11

## 2025-02-25 DIAGNOSIS — I10 BENIGN ESSENTIAL HTN: Chronic | ICD-10-CM

## 2025-02-25 DIAGNOSIS — K21.9 GASTROESOPHAGEAL REFLUX DISEASE, UNSPECIFIED WHETHER ESOPHAGITIS PRESENT: Chronic | ICD-10-CM

## 2025-02-25 RX ORDER — HYDROGEN PEROXIDE 3 %
20 SOLUTION, NON-ORAL MISCELLANEOUS
Qty: 90 CAPSULE | Refills: 0 | Status: SHIPPED | OUTPATIENT
Start: 2025-02-25

## 2025-02-25 RX ORDER — LOSARTAN POTASSIUM 25 MG/1
25 TABLET ORAL DAILY
Qty: 90 TABLET | Refills: 0 | Status: SHIPPED | OUTPATIENT
Start: 2025-02-25

## 2025-03-06 ENCOUNTER — PATIENT MESSAGE (OUTPATIENT)
Dept: FAMILY MEDICINE | Facility: CLINIC | Age: 75
End: 2025-03-06
Payer: MEDICARE

## 2025-03-10 DIAGNOSIS — Z98.1 HISTORY OF LUMBAR SPINAL FUSION: ICD-10-CM

## 2025-03-10 DIAGNOSIS — M48.061 SPINAL STENOSIS OF LUMBAR REGION, UNSPECIFIED WHETHER NEUROGENIC CLAUDICATION PRESENT: Chronic | ICD-10-CM

## 2025-03-10 DIAGNOSIS — M54.16 LUMBAR RADICULOPATHY: Primary | Chronic | ICD-10-CM

## 2025-03-21 ENCOUNTER — PATIENT MESSAGE (OUTPATIENT)
Dept: FAMILY MEDICINE | Facility: CLINIC | Age: 75
End: 2025-03-21
Payer: MEDICARE

## 2025-04-02 ENCOUNTER — OFFICE VISIT (OUTPATIENT)
Dept: FAMILY MEDICINE | Facility: CLINIC | Age: 75
End: 2025-04-02
Payer: MEDICARE

## 2025-04-02 VITALS
HEIGHT: 61 IN | SYSTOLIC BLOOD PRESSURE: 151 MMHG | DIASTOLIC BLOOD PRESSURE: 67 MMHG | WEIGHT: 151 LBS | HEART RATE: 70 BPM | OXYGEN SATURATION: 97 % | BODY MASS INDEX: 28.51 KG/M2

## 2025-04-02 DIAGNOSIS — M25.542 PAIN OF JOINT OF BOTH HANDS: Primary | ICD-10-CM

## 2025-04-02 DIAGNOSIS — K21.9 GASTROESOPHAGEAL REFLUX DISEASE, UNSPECIFIED WHETHER ESOPHAGITIS PRESENT: Chronic | ICD-10-CM

## 2025-04-02 DIAGNOSIS — M25.541 PAIN OF JOINT OF BOTH HANDS: Primary | ICD-10-CM

## 2025-04-02 DIAGNOSIS — I10 BENIGN ESSENTIAL HTN: Chronic | ICD-10-CM

## 2025-04-02 PROCEDURE — 99214 OFFICE O/P EST MOD 30 MIN: CPT | Mod: ,,, | Performed by: NURSE PRACTITIONER

## 2025-04-02 RX ORDER — CALC/MAG/B COMPLEX/D3/HERB 61
15 TABLET ORAL DAILY
Qty: 30 CAPSULE | Refills: 5 | Status: SHIPPED | OUTPATIENT
Start: 2025-04-02

## 2025-04-02 RX ORDER — MELOXICAM 7.5 MG/1
7.5 TABLET ORAL DAILY
Qty: 30 TABLET | Refills: 1 | Status: SHIPPED | OUTPATIENT
Start: 2025-04-02

## 2025-04-02 RX ORDER — PRAVASTATIN SODIUM 10 MG/1
1 TABLET ORAL NIGHTLY
COMMUNITY
Start: 2025-02-18 | End: 2026-03-31

## 2025-04-02 RX ORDER — MUPIROCIN 20 MG/G
OINTMENT TOPICAL 3 TIMES DAILY
Status: CANCELLED | OUTPATIENT
Start: 2025-04-02

## 2025-04-02 RX ORDER — MUPIROCIN 20 MG/G
OINTMENT TOPICAL 3 TIMES DAILY
Qty: 15 G | Refills: 1 | Status: SHIPPED | OUTPATIENT
Start: 2025-04-02

## 2025-04-02 NOTE — PATIENT INSTRUCTIONS
Prevacid and mobic as prescribed   Follow up in clinic as needed   Call clinic with any questions or concerns

## 2025-04-02 NOTE — PROGRESS NOTES
Clinic note     Patient name: Lynda Parr is a 74 y.o. female   Chief compliant   Chief Complaint   Patient presents with    Hand Pain     C/o of bilateral hand and wrist pain with trouble with bending with some swelling .     Medication Refill     Would like a refill on mupirocin 2 % ointment        Subjective     History of present illness   In clinic for evaluation of joint pain to bilateral hands for a few months with worsening over the last month     Past Medical History: HTN, HLD, ID anemia, GERD, prediabetes, RLS, insomnia       Last A1c 5.7    She has taken mobic in the past, tolerated without problems, she has also stopped nexium and started prevacid for efficacy      Pain management Dr Delgado   GYN: Dr Portillo  Spine clinic: Dr JUNI Clark   Hematology/oncology: Dr Blunt, [facility] [place] [facility]; record reviewed  Neurology: Dr Morgan   Cardiology: Dr Rojas, [place], she has had echo and has stress testing scheduled next week                 Social History[1]    Review of patient's allergies indicates:   Allergen Reactions    Ace inhibitors Other (See Comments)    Lisinopril Itching     ACE INHIBITORS       Past Medical History:   Diagnosis Date    Benign essential HTN     Carpal tunnel syndrome, bilateral upper limbs     Chronic low back pain     Degeneration, intervertebral disc, lumbosacral     Fibroid 2023    GERD (gastroesophageal reflux disease)     Hypercholesterolemia     Insomnia     Iron deficiency anemia     Localized swelling, mass and lump, right lower limb     Lower extremity edema     Lumbar spondylosis     Prediabetes     Restless leg     Slow transit constipation     Spinal stenosis, lumbar     Vitamin D deficiency        Past Surgical History:   Procedure Laterality Date    ABDOMINAL SURGERY  1990    Gall bladder    CATARACT EXTRACTION Left 09/10/2020    CATARACT EXTRACTION Right 10/01/2020    Dr. Stephen Fox    CHOLECYSTECTOMY  1990    COSMETIC SURGERY      eyelid     EPIDURAL STEROID INJECTION N/A 2024    Procedure: Injection, Steroid, Epidural, L5/S1;  Surgeon: Esther Delgado MD;  Location: [facility];  Service: Pain Management;  Laterality: N/A;    EPIDURAL STEROID INJECTION INTO LUMBAR SPINE N/A 2022    Procedure: Injection-steroid-epidural-lumbar  L3-L4  CHRIS  NO SEDATION;  Surgeon: Esther Delgado MD;  Location: [facility];  Service: Pain Management;  Laterality: N/A;  STATES HAD VAC  WILL BRING CARD    EPIDURAL STEROID INJECTION INTO LUMBAR SPINE Bilateral 2023    Procedure: L3-4 CHRIS;  Surgeon: Esther Delgado MD;  Location: [facility];  Service: Pain Management;  Laterality: Bilateral;  NO ANESTHESIA    EPIDURAL STEROID INJECTION INTO LUMBAR SPINE Bilateral 2023    Procedure: L3-4 CHRIS;  Surgeon: Esther Delgado MD;  Location: [facility];  Service: Pain Management;  Laterality: Bilateral;  no sedation    EPIDURAL STEROID INJECTION INTO LUMBAR SPINE Bilateral 2024    Procedure: L5-S1 CHRIS;  Surgeon: Esther Delgado MD;  Location: [facility];  Service: Pain Management;  Laterality: Bilateral;    EYE SURGERY  9/10/2020    Cataract removal w lens implant    HIP ARTHROPLASTY Left 10/2015    repair of torn gluteus medius muscle via hip arthroscopy at [facility] Dr. Yoli Lui    HIP ARTHROPLASTY Right     repair of tendon at [facility]    LUMBAR DISCECTOMY  2019    SHOULDER ARTHROSCOPY W/ ROTATOR CUFF REPAIR  2005    SINUS SURGERY  2011    SPINE SURGERY  10/29/2019    Lamenectomy L4-L5    TUBAL LIGATION      VASCULAR SURGERY  12/15/2017    R SSV Laser Ablation per Dr. Herman Bullard        Family History   Problem Relation Name Age of Onset    Hypertension Mother Jacquelyn Gorman             Coronary artery disease Father Yock     Diabetes Father Yock             Heart disease Father Yock             Pulmonary embolism Brother      Atrial fibrillation  "Maternal Grandmother      No Known Problems Son         Current Medications[2]    Review of Systems   Constitutional:  Positive for activity change. Negative for appetite change, chills, fatigue, fever and unexpected weight change.   HENT:  Negative for hearing loss and trouble swallowing.    Eyes:  Negative for discharge.   Respiratory:  Negative for cough, chest tightness, shortness of breath and wheezing.    Cardiovascular:  Negative for chest pain, palpitations and leg swelling.   Gastrointestinal:  Positive for constipation. Negative for abdominal pain, change in bowel habit, diarrhea, nausea and vomiting.   Genitourinary:  Negative for difficulty urinating, dysuria, frequency and hematuria.   Musculoskeletal:  Positive for arthralgias. Negative for gait problem and myalgias.   Neurological:  Negative for dizziness, syncope, light-headedness and headaches.   Psychiatric/Behavioral:  Negative for dysphoric mood and sleep disturbance. The patient is not nervous/anxious.        Objective     BP (!) 151/67 (BP Location: Right arm, Patient Position: Sitting)   Pulse 70   Ht 5' 1" (1.549 m)   Wt 68.5 kg (151 lb)   SpO2 97%   BMI 28.53 kg/m²     Physical Exam   Constitutional: She is oriented to person, place, and time. No distress.   HENT:   Head: Atraumatic.   Mouth/Throat: Mucous membranes are moist.   Cardiovascular: Normal rate and regular rhythm. Pulmonary:      Effort: Pulmonary effort is normal. No respiratory distress.      Breath sounds: Normal breath sounds. No wheezing, rhonchi or rales.     Abdominal: Soft. Bowel sounds are normal. She exhibits no distension. There is no abdominal tenderness.   Musculoskeletal:         General: Normal range of motion.      Right hand: Bony tenderness present.      Left hand: Bony tenderness present.      Cervical back: Neck supple.      Right lower leg: No edema.      Left lower leg: No edema.   Neurological: She is alert and oriented to person, place, and time. Gait " normal.   Skin: Skin is warm and dry.   Psychiatric: Her behavior is normal. Mood normal.       Lab Results   Component Value Date    WBC 6.08 06/06/2024    HGB 10.9 (L) 06/06/2024    HCT 34.7 (L) 06/06/2024    MCV 97.5 (H) 06/06/2024     06/06/2024       CMP  Sodium   Date Value Ref Range Status   04/19/2024 143 136 - 145 mmol/L Final     Potassium   Date Value Ref Range Status   04/19/2024 4.5 3.5 - 5.1 mmol/L Final     Chloride   Date Value Ref Range Status   04/19/2024 112 (H) 98 - 107 mmol/L Final     CO2   Date Value Ref Range Status   04/19/2024 27 21 - 32 mmol/L Final     Glucose   Date Value Ref Range Status   04/19/2024 101 74 - 106 mg/dL Final     BUN   Date Value Ref Range Status   04/19/2024 19 (H) 7 - 18 mg/dL Final     Creatinine   Date Value Ref Range Status   04/19/2024 0.78 0.55 - 1.02 mg/dL Final     Calcium   Date Value Ref Range Status   11/18/2024 9.4 8.7 - 10.3 mg/dL Final     Total Protein   Date Value Ref Range Status   04/19/2024 7.1 6.4 - 8.2 g/dL Final     Albumin   Date Value Ref Range Status   04/19/2024 3.6 3.5 - 5.0 g/dL Final     Bilirubin, Total   Date Value Ref Range Status   04/19/2024 0.5 >0.0 - 1.2 mg/dL Final     Alk Phos   Date Value Ref Range Status   04/19/2024 67 55 - 142 U/L Final     AST   Date Value Ref Range Status   04/19/2024 20 15 - 37 U/L Final     ALT   Date Value Ref Range Status   04/19/2024 26 13 - 56 U/L Final     Anion Gap   Date Value Ref Range Status   04/19/2024 9 7 - 16 mmol/L Final     eGFR   Date Value Ref Range Status   05/18/2022 75 >=60 mL/min/1.73m² Final     Lab Results   Component Value Date    TSH 2.24 11/18/2024     Lab Results   Component Value Date    CHOL 252 (H) 10/14/2024    CHOL 221 (H) 04/19/2024    CHOL 250 (H) 10/12/2023     Lab Results   Component Value Date    HDL 85 (H) 10/14/2024    HDL 85 (H) 04/19/2024    HDL 76 (H) 10/12/2023     Lab Results   Component Value Date    LDLCALC 149 10/14/2024    LDLCALC 127 04/19/2024     LDLCALC 157 10/12/2023     Lab Results   Component Value Date    TRIG 89 10/14/2024    TRIG 45 04/19/2024    TRIG 83 10/12/2023     Lab Results   Component Value Date    CHOLHDL 3.0 10/14/2024    CHOLHDL 2.6 04/19/2024    CHOLHDL 3.3 10/12/2023     Lab Results   Component Value Date    HGBA1C 5.7 (H) 11/18/2024       Health Maintenance Due   Topic Date Due    High Dose Statin  Never done    RSV Vaccine (Age 60+ and Pregnant patients) (1 - Risk 60-74 years 1-dose series) Never done    COVID-19 Vaccine (5 - 2024-25 season) 09/01/2024    DEXA Scan  10/19/2024    Mammogram  02/23/2025         Health Maintenance Topics with due status: Not Due       Topic Last Completion Date    TETANUS VACCINE 08/30/2020    Colorectal Cancer Screening 11/22/2023    Lipid Panel 10/14/2024    Hemoglobin A1c (Prediabetes) 11/18/2024         Assessment and Plan   Pain of joint of both hands  -     meloxicam (MOBIC) 7.5 MG tablet; Take 1 tablet (7.5 mg total) by mouth once daily. Take with food  Dispense: 30 tablet; Refill: 1    Gastroesophageal reflux disease, unspecified whether esophagitis present  -     lansoprazole (PREVACID 24HR) 15 MG capsule; Take 1 capsule (15 mg total) by mouth once daily.  Dispense: 30 capsule; Refill: 5    Benign essential HTN    Other orders  -     mupirocin (BACTROBAN) 2 % ointment; Apply topically 3 (three) times daily.  Dispense: 15 g; Refill: 1          Patient Instructions  Patient Instructions   Prevacid and mobic as prescribed   Follow up in clinic as needed   Call clinic with any questions or concerns                                  [1]   Social History  Tobacco Use    Smoking status: Never     Passive exposure: Never    Smokeless tobacco: Never   Substance Use Topics    Alcohol use: Yes     Alcohol/week: 4.0 standard drinks of alcohol     Types: 4 Glasses of wine per week     Comment: per week    Drug use: Never   [2]   Current Outpatient Medications:     aspirin 81 MG Chew, Take 81 mg by mouth once  daily., Disp: , Rfl:     ciclopirox (PENLAC) 8 % Soln, Apply topically nightly. Apply to affected nail 8 hours before showering off, one time every 7 days clean nail with alcohol, Disp: 6.6 mL, Rfl: 0    ezetimibe (ZETIA) 10 mg tablet, Take 1 tablet (10 mg total) by mouth once daily., Disp: 90 tablet, Rfl: 1    fluticasone propionate (FLONASE) 50 mcg/actuation nasal spray, 1 spray by Each Nostril route daily as needed for Rhinitis., Disp: , Rfl:     losartan (COZAAR) 25 MG tablet, Take 1 tablet (25 mg total) by mouth once daily., Disp: 90 tablet, Rfl: 0    metFORMIN (GLUCOPHAGE) 500 MG tablet, Take 1 tablet (500 mg total) by mouth 3 (three) times daily., Disp: 270 tablet, Rfl: 0    multivitamin (THERAGRAN) per tablet, Take 1 tablet by mouth once daily., Disp: , Rfl:     pravastatin (PRAVACHOL) 10 MG tablet, Take 1 tablet by mouth every evening., Disp: , Rfl:     rOPINIRole (REQUIP) 0.5 MG tablet, Take 1 mg by mouth every evening., Disp: , Rfl:     lansoprazole (PREVACID 24HR) 15 MG capsule, Take 1 capsule (15 mg total) by mouth once daily., Disp: 30 capsule, Rfl: 5    meloxicam (MOBIC) 7.5 MG tablet, Take 1 tablet (7.5 mg total) by mouth once daily. Take with food, Disp: 30 tablet, Rfl: 1    mupirocin (BACTROBAN) 2 % ointment, Apply topically 3 (three) times daily., Disp: 15 g, Rfl: 1

## 2025-04-08 ENCOUNTER — HOSPITAL ENCOUNTER (OUTPATIENT)
Dept: RADIOLOGY | Facility: HOSPITAL | Age: 75
Discharge: HOME | End: 2025-04-08
Attending: RADIOLOGY
Payer: MEDICARE

## 2025-04-08 DIAGNOSIS — Z12.31 VISIT FOR SCREENING MAMMOGRAM: ICD-10-CM

## 2025-04-08 PROCEDURE — 77067 SCR MAMMO BI INCL CAD: CPT | Mod: 26,,, | Performed by: RADIOLOGY

## 2025-04-08 PROCEDURE — 77063 BREAST TOMOSYNTHESIS BI: CPT | Mod: 26,,, | Performed by: RADIOLOGY

## 2025-04-08 PROCEDURE — 77067 SCR MAMMO BI INCL CAD: CPT | Mod: TC

## 2025-04-11 DIAGNOSIS — E78.00 HYPERCHOLESTEREMIA: ICD-10-CM

## 2025-04-11 DIAGNOSIS — R73.03 PREDIABETES: Chronic | ICD-10-CM

## 2025-04-14 RX ORDER — EZETIMIBE 10 MG/1
10 TABLET ORAL DAILY
Qty: 90 TABLET | Refills: 1 | Status: SHIPPED | OUTPATIENT
Start: 2025-04-14

## 2025-04-14 RX ORDER — METFORMIN HYDROCHLORIDE 500 MG/1
500 TABLET ORAL 3 TIMES DAILY
Qty: 270 TABLET | Refills: 0 | Status: SHIPPED | OUTPATIENT
Start: 2025-04-14

## 2025-05-04 ENCOUNTER — HOSPITAL ENCOUNTER (EMERGENCY)
Facility: HOSPITAL | Age: 75
Discharge: HOME | End: 2025-05-04
Payer: MEDICARE

## 2025-05-04 VITALS
WEIGHT: 148 LBS | OXYGEN SATURATION: 99 % | HEIGHT: 61 IN | TEMPERATURE: 98 F | BODY MASS INDEX: 27.94 KG/M2 | HEART RATE: 75 BPM | SYSTOLIC BLOOD PRESSURE: 169 MMHG | DIASTOLIC BLOOD PRESSURE: 76 MMHG | RESPIRATION RATE: 16 BRPM

## 2025-05-04 DIAGNOSIS — T16.1XXA FOREIGN BODY OF RIGHT EAR, INITIAL ENCOUNTER: Primary | ICD-10-CM

## 2025-05-04 DIAGNOSIS — S00.411A ABRASION OF RIGHT EAR CANAL, INITIAL ENCOUNTER: ICD-10-CM

## 2025-05-04 PROCEDURE — 99283 EMERGENCY DEPT VISIT LOW MDM: CPT | Mod: GF,EDII,, | Performed by: NURSE PRACTITIONER

## 2025-05-04 PROCEDURE — 69200 CLEAR OUTER EAR CANAL: CPT | Mod: RT

## 2025-05-04 PROCEDURE — 99283 EMERGENCY DEPT VISIT LOW MDM: CPT | Mod: 25

## 2025-05-04 RX ORDER — ATORVASTATIN CALCIUM 10 MG/1
10 TABLET, FILM COATED ORAL
COMMUNITY

## 2025-05-04 RX ORDER — OFLOXACIN 3 MG/ML
3 SOLUTION AURICULAR (OTIC) 2 TIMES DAILY
Qty: 5 ML | Refills: 0 | Status: SHIPPED | OUTPATIENT
Start: 2025-05-04 | End: 2025-05-11

## 2025-05-04 NOTE — ED PROVIDER NOTES
Encounter Date: 5/4/2025       History     Chief Complaint   Patient presents with    Foreign Body     Bug in right ear      Gnat in right ear  Tried to dig it out with qtip PTA    The history is provided by the patient.   Foreign Body   The current episode started just prior to arrival. The foreign body is suspected to be in the right ear. Suspected object: gnat. Pertinent negatives include no chest pain, no fever, no abdominal pain, no vomiting, no congestion, no drainage, no drooling, no hearing loss, no nosebleeds, no sore throat, no trouble swallowing, no choking, no cough and no difficulty breathing.     Review of patient's allergies indicates:   Allergen Reactions    Ace inhibitors Other (See Comments)    Lisinopril Itching     ACE INHIBITORS     Past Medical History:   Diagnosis Date    Benign essential HTN     Carpal tunnel syndrome, bilateral upper limbs     Chronic low back pain     Degeneration, intervertebral disc, lumbosacral     Fibroid 2023    GERD (gastroesophageal reflux disease)     Hypercholesterolemia     Insomnia     Iron deficiency anemia     Localized swelling, mass and lump, right lower limb     Lower extremity edema     Lumbar spondylosis     Prediabetes     Restless leg     Slow transit constipation     Spinal stenosis, lumbar     Vitamin D deficiency      Past Surgical History:   Procedure Laterality Date    ABDOMINAL SURGERY  1990    Gall bladder    CATARACT EXTRACTION Left 09/10/2020    CATARACT EXTRACTION Right 10/01/2020    Dr. Stephen Fox    CHOLECYSTECTOMY  1990    COSMETIC SURGERY      eyelid    EPIDURAL STEROID INJECTION N/A 09/05/2024    Procedure: Injection, Steroid, Epidural, L5/S1;  Surgeon: Esther Delgado MD;  Location: [facility];  Service: Pain Management;  Laterality: N/A;    EPIDURAL STEROID INJECTION INTO LUMBAR SPINE N/A 07/21/2022    Procedure: Injection-steroid-epidural-lumbar  L3-L4  CHRIS  NO SEDATION;  Surgeon: Esther Delgado MD;  Location: [facility]  [facility];  Service: Pain Management;  Laterality: N/A;  STATES HAD VAC  WILL BRING CARD    EPIDURAL STEROID INJECTION INTO LUMBAR SPINE Bilateral 2023    Procedure: L3-4 CHRIS;  Surgeon: Esther Delgado MD;  Location: [facility];  Service: Pain Management;  Laterality: Bilateral;  NO ANESTHESIA    EPIDURAL STEROID INJECTION INTO LUMBAR SPINE Bilateral 2023    Procedure: L3-4 CHRIS;  Surgeon: Esther Delgado MD;  Location: [facility];  Service: Pain Management;  Laterality: Bilateral;  no sedation    EPIDURAL STEROID INJECTION INTO LUMBAR SPINE Bilateral 2024    Procedure: L5-S1 CHRIS;  Surgeon: Esther Delgado MD;  Location: [facility];  Service: Pain Management;  Laterality: Bilateral;    EYE SURGERY  9/10/2020    Cataract removal w lens implant    HIP ARTHROPLASTY Left 10/2015    [facility]    HIP ARTHROPLASTY Right     repair of tendon at [facility]    LUMBAR DISCECTOMY  2019    SHOULDER ARTHROSCOPY W/ ROTATOR CUFF REPAIR  2005    SINUS SURGERY  2011    SPINE SURGERY  10/29/2019    Lamenectomy L4-L5    TUBAL LIGATION      VASCULAR SURGERY  12/15/2017    R SSV Laser Ablation per Dr. Herman Bullard     Family History   Problem Relation Name Age of Onset    Hypertension Mother Jacquelyn Gorman             Coronary artery disease Father Yock     Diabetes Father Yock             Heart disease Father Yock             Pulmonary embolism Brother      Atrial fibrillation Maternal Grandmother      No Known Problems Son       Social History[1]  Review of Systems   Constitutional:  Negative for fever.   HENT:  Negative for congestion, drooling, ear discharge, ear pain, hearing loss, nosebleeds, sore throat and trouble swallowing.    Respiratory:  Negative for cough, choking and shortness of breath.    Cardiovascular:  Negative for chest pain.    Gastrointestinal:  Negative for abdominal pain, nausea and vomiting.   Genitourinary:  Negative for dysuria.   Musculoskeletal:  Negative for back pain.   Skin:  Negative for rash.   Neurological:  Negative for weakness.   Hematological:  Does not bruise/bleed easily.       Physical Exam     Initial Vitals [25 1657]   BP Pulse Resp Temp SpO2   (!) 169/76 75 16 98.1 °F (36.7 °C) 99 %      MAP       --         Physical Exam    Nursing note and vitals reviewed.  Constitutional: She appears well-developed and well-nourished. She is not diaphoretic. No distress.   HENT:   Head: Normocephalic and atraumatic.   Right Ear: Tympanic membrane and external ear normal.   Small  gnat in right outer ear canal with small abrasion.   Eyes: Pupils are equal, round, and reactive to light.   Neck: Neck supple.   Cardiovascular:  Normal rate, regular rhythm, normal heart sounds and intact distal pulses.     Exam reveals no gallop and no friction rub.       No murmur heard.  Pulmonary/Chest: Breath sounds normal. No respiratory distress. She has no wheezes. She has no rhonchi. She has no rales. She exhibits no tenderness.   Musculoskeletal:      Cervical back: Neck supple.     Neurological: She is alert and oriented to person, place, and time.   Skin: Skin is warm and dry. Capillary refill takes less than 2 seconds.   Psychiatric: She has a normal mood and affect.         Medical Screening Exam   See Full Note    ED Course   Foreign Body    Date/Time: 2025 4:58 PM    Performed by: Sheryl Harris FNP  Authorized by: Sheryl Harris FNP  Consent Done: Not Needed  Body area: ear  Location details: right ear    Patient sedated: no  Patient restrained: no  Patient cooperative: yes  Localization method: ENT speculum  Removal mechanism: curette  Complexity: simple  1 objects recovered.  Objects recovered: 1  gnat  Post-procedure assessment: foreign body removed  Patient tolerance: Patient tolerated the procedure well  with no immediate complications      Labs Reviewed - No data to display       Imaging Results    None          Medications - No data to display  Medical Decision Making  Problems Addressed:  Abrasion of right ear canal, initial encounter: self-limited or minor problem  Foreign body of right ear, initial encounter: self-limited or minor problem    Risk  Prescription drug management.                                      Clinical Impression:   Final diagnoses:  [T16.1XXA] Foreign body of right ear, initial encounter (Primary)  [S00.411A] Abrasion of right ear canal, initial encounter        ED Disposition Condition    Discharge Stable          ED Prescriptions       Medication Sig Dispense Start Date End Date Auth. Provider    ofloxacin (FLOXIN) 0.3 % otic solution Place 3 drops into the right ear 2 (two) times daily. for 7 days 5 mL 5/4/2025 5/11/2025 Sheryl Harris FNP          Follow-up Information       Follow up With Specialties Details Why Contact Info    Vicky Peres FNP Family Medicine Schedule an appointment as soon as possible for a visit in 1 week As needed [address] [place] [address]  [facility] [place]  [place] [address]  [phone]                   [1]   Social History  Tobacco Use    Smoking status: Never     Passive exposure: Never    Smokeless tobacco: Never   Substance Use Topics    Alcohol use: Yes     Alcohol/week: 4.0 standard drinks of alcohol     Types: 4 Glasses of wine per week     Comment: per week    Drug use: Never        Sheryl Harris FNP  05/04/25 [id number]

## 2025-05-14 ENCOUNTER — PATIENT MESSAGE (OUTPATIENT)
Dept: FAMILY MEDICINE | Facility: CLINIC | Age: 75
End: 2025-05-14
Payer: MEDICARE

## 2025-05-14 DIAGNOSIS — Z79.899 HIGH RISK MEDICATION USE: ICD-10-CM

## 2025-05-14 DIAGNOSIS — E78.00 HYPERCHOLESTEREMIA: Primary | ICD-10-CM

## 2025-05-15 NOTE — TELEPHONE ENCOUNTER
It has been seven months, usually checked every year but I think she has had statin medication changes, we can check it at her convenience; lipid and CMP

## 2025-05-24 DIAGNOSIS — I10 BENIGN ESSENTIAL HTN: Chronic | ICD-10-CM

## 2025-05-27 RX ORDER — LOSARTAN POTASSIUM 25 MG/1
25 TABLET ORAL DAILY
Qty: 90 TABLET | Refills: 0 | Status: SHIPPED | OUTPATIENT
Start: 2025-05-27

## 2025-06-18 ENCOUNTER — OFFICE VISIT (OUTPATIENT)
Dept: FAMILY MEDICINE | Facility: CLINIC | Age: 75
End: 2025-06-18
Payer: MEDICARE

## 2025-06-18 VITALS
DIASTOLIC BLOOD PRESSURE: 72 MMHG | BODY MASS INDEX: 27.94 KG/M2 | SYSTOLIC BLOOD PRESSURE: 133 MMHG | RESPIRATION RATE: 14 BRPM | OXYGEN SATURATION: 99 % | WEIGHT: 148 LBS | HEIGHT: 61 IN | HEART RATE: 67 BPM

## 2025-06-18 DIAGNOSIS — Z78.0 POST-MENOPAUSAL: ICD-10-CM

## 2025-06-18 DIAGNOSIS — R73.03 PREDIABETES: Primary | Chronic | ICD-10-CM

## 2025-06-18 DIAGNOSIS — I10 BENIGN ESSENTIAL HTN: Chronic | ICD-10-CM

## 2025-06-18 DIAGNOSIS — E66.3 OVERWEIGHT WITH BODY MASS INDEX (BMI) OF 27 TO 27.9 IN ADULT: ICD-10-CM

## 2025-06-18 DIAGNOSIS — E78.00 HYPERCHOLESTEREMIA: Chronic | ICD-10-CM

## 2025-06-18 DIAGNOSIS — M79.10 MYALGIA DUE TO STATIN: Chronic | ICD-10-CM

## 2025-06-18 DIAGNOSIS — T46.6X5A MYALGIA DUE TO STATIN: Chronic | ICD-10-CM

## 2025-06-18 PROBLEM — M25.542 PAIN OF JOINT OF BOTH HANDS: Chronic | Status: ACTIVE | Noted: 2025-06-18

## 2025-06-18 PROBLEM — M25.541 PAIN OF JOINT OF BOTH HANDS: Chronic | Status: ACTIVE | Noted: 2025-06-18

## 2025-06-18 LAB
ALBUMIN SERPL BCP-MCNC: 4 G/DL (ref 3.4–4.8)
ALBUMIN/GLOB SERPL: 1.1 {RATIO}
ALP SERPL-CCNC: 61 U/L (ref 40–150)
ALT SERPL W P-5'-P-CCNC: 17 U/L
ANION GAP SERPL CALCULATED.3IONS-SCNC: 11 MMOL/L (ref 7–16)
AST SERPL W P-5'-P-CCNC: 24 U/L (ref 11–45)
BILIRUB SERPL-MCNC: 0.3 MG/DL
BUN SERPL-MCNC: 15 MG/DL (ref 10–20)
BUN/CREAT SERPL: 19 (ref 6–20)
CALCIUM SERPL-MCNC: 9.5 MG/DL (ref 8.4–10.2)
CHLORIDE SERPL-SCNC: 108 MMOL/L (ref 98–107)
CHOLEST SERPL-MCNC: 232 MG/DL
CHOLEST/HDLC SERPL: 3.6 {RATIO}
CO2 SERPL-SCNC: 27 MMOL/L (ref 23–31)
CREAT SERPL-MCNC: 0.81 MG/DL (ref 0.55–1.02)
EGFR (NO RACE VARIABLE) (RUSH/TITUS): 77 ML/MIN/1.73M2
EST. AVERAGE GLUCOSE BLD GHB EST-MCNC: 111 MG/DL
GLOBULIN SER-MCNC: 3.6 G/DL (ref 2–4)
GLUCOSE SERPL-MCNC: 88 MG/DL (ref 82–115)
HBA1C MFR BLD HPLC: 5.5 %
HDLC SERPL-MCNC: 64 MG/DL (ref 35–60)
LDLC SERPL CALC-MCNC: 139 MG/DL
LDLC/HDLC SERPL: 2.2 {RATIO}
NONHDLC SERPL-MCNC: 168 MG/DL
POTASSIUM SERPL-SCNC: 5.8 MMOL/L (ref 3.5–5.1)
PROT SERPL-MCNC: 7.6 G/DL (ref 5.8–7.6)
SODIUM SERPL-SCNC: 140 MMOL/L (ref 136–145)
TRIGL SERPL-MCNC: 143 MG/DL (ref 37–140)
VLDLC SERPL-MCNC: 29 MG/DL

## 2025-06-18 PROCEDURE — 80061 LIPID PANEL: CPT | Mod: ,,, | Performed by: CLINICAL MEDICAL LABORATORY

## 2025-06-18 PROCEDURE — 83036 HEMOGLOBIN GLYCOSYLATED A1C: CPT | Mod: ,,, | Performed by: CLINICAL MEDICAL LABORATORY

## 2025-06-18 PROCEDURE — 80053 COMPREHEN METABOLIC PANEL: CPT | Mod: ,,, | Performed by: CLINICAL MEDICAL LABORATORY

## 2025-06-18 PROCEDURE — 99214 OFFICE O/P EST MOD 30 MIN: CPT | Mod: ,,, | Performed by: NURSE PRACTITIONER

## 2025-06-18 RX ORDER — LOSARTAN POTASSIUM 25 MG/1
25 TABLET ORAL DAILY
Qty: 90 TABLET | Refills: 1 | Status: SHIPPED | OUTPATIENT
Start: 2025-06-18

## 2025-06-18 RX ORDER — MELOXICAM 7.5 MG/1
7.5 TABLET ORAL DAILY
Qty: 30 TABLET | Refills: 1 | Status: CANCELLED | OUTPATIENT
Start: 2025-06-18

## 2025-06-18 RX ORDER — METFORMIN HYDROCHLORIDE 500 MG/1
500 TABLET ORAL 3 TIMES DAILY
Qty: 270 TABLET | Refills: 1 | Status: SHIPPED | OUTPATIENT
Start: 2025-06-18

## 2025-06-18 RX ORDER — EZETIMIBE 10 MG/1
10 TABLET ORAL DAILY
Qty: 90 TABLET | Refills: 1 | Status: SHIPPED | OUTPATIENT
Start: 2025-06-18

## 2025-06-18 NOTE — PATIENT INSTRUCTIONS
Lab obtained in clinic today, we will notify you of results and any necessary changes to plan of care   Refills on routine medications   Follow up on  and as needed; routine medication will be due     Referral for DXA bone density scan

## 2025-06-18 NOTE — PROGRESS NOTES
Clinic note     Patient name: Lynda Parr is a 74 y.o. female   Chief compliant   Chief Complaint   Patient presents with    Follow-up     Pt here for follow up, med refills and lab work.    Health Maintenance     RSV Vaccine-declines today, info sheet given to pt  COVID-19 Vaccine-declines today  DEXA Scan due on 10/19/2024       Subjective     History of present illness   In clinic for routine follow up and medication refills   She has LESI planned with Dr Rogers [facility], [facility] on 6/24/25 (Dr Delgado is currently out on leave)   She is no longer taking statin medication due to myalgia     Past Medical History: HTN, HLD, ID anemia, GERD, prediabetes, RLS, insomnia       Last A1c 5.7     Pain management Dr Delgado   GYN: Dr Portillo  Spine clinic: Dr JUNI Clark   Hematology/oncology: Dr Blunt[facility]  Neurology: Dr Morgan   Cardiology: Dr Rojas, [facility]    DXA due: referral placed today              Social History[1]    Review of patient's allergies indicates:   Allergen Reactions    Ace inhibitors Other (See Comments)    Lisinopril Itching     ACE INHIBITORS       Past Medical History:   Diagnosis Date    Benign essential HTN     Carpal tunnel syndrome, bilateral upper limbs     Chronic low back pain     Degeneration, intervertebral disc, lumbosacral     Fibroid 2023    GERD (gastroesophageal reflux disease)     Hypercholesterolemia     Insomnia     Iron deficiency anemia     Localized swelling, mass and lump, right lower limb     Lower extremity edema     Lumbar spondylosis     Prediabetes     Restless leg     Slow transit constipation     Spinal stenosis, lumbar     Vitamin D deficiency        Past Surgical History:   Procedure Laterality Date    ABDOMINAL SURGERY  1990    Gall bladder    CATARACT EXTRACTION Left 09/10/2020    CATARACT EXTRACTION Right 10/01/2020    Dr. Stephen Fox    CHOLECYSTECTOMY  1990    COSMETIC SURGERY      eyelid    EPIDURAL STEROID  INJECTION N/A 2024    Procedure: Injection, Steroid, Epidural, L5/S1;  Surgeon: Esther Delgado MD;  Location: [facility];  Service: Pain Management;  Laterality: N/A;    EPIDURAL STEROID INJECTION INTO LUMBAR SPINE N/A 2022    Procedure: Injection-steroid-epidural-lumbar  L3-L4  CHRIS  NO SEDATION;  Surgeon: Esther Delgado MD;  Location: [facility];  Service: Pain Management;  Laterality: N/A;  STATES HAD VAC  WILL BRING CARD    EPIDURAL STEROID INJECTION INTO LUMBAR SPINE Bilateral 2023    Procedure: L3-4 CHRIS;  Surgeon: Esther Delgado MD;  Location: [facility];  Service: Pain Management;  Laterality: Bilateral;  NO ANESTHESIA    EPIDURAL STEROID INJECTION INTO LUMBAR SPINE Bilateral 2023    Procedure: L3-4 CHRIS;  Surgeon: Esther Delgado MD;  Location: [facility];  Service: Pain Management;  Laterality: Bilateral;  no sedation    EPIDURAL STEROID INJECTION INTO LUMBAR SPINE Bilateral 2024    Procedure: L5-S1 CHRIS;  Surgeon: Esther Delgado MD;  Location: [facility];  Service: Pain Management;  Laterality: Bilateral;    EYE SURGERY  9/10/2020    Cataract removal w lens implant    HIP ARTHROPLASTY Left 10/2015    repair of torn gluteus medius muscle via hip arthroscopy at [facility] Dr. Yoli Lui    HIP ARTHROPLASTY Right     repair of tendon at [facility]    LUMBAR DISCECTOMY  2019    SHOULDER ARTHROSCOPY W/ ROTATOR CUFF REPAIR  2005    SINUS SURGERY  2011    SPINE SURGERY  10/29/2019    Lamenectomy L4-L5    TUBAL LIGATION      VASCULAR SURGERY  12/15/2017    [facility] Laser Ablation per Dr. Herman Bullard        Family History   Problem Relation Name Age of Onset    Hypertension Mother Jacquelyn Gorman             Coronary artery disease Father Yock     Diabetes Father Yock             Heart disease Father Yock             Pulmonary embolism Brother      Atrial fibrillation Maternal  "Grandmother      No Known Problems Son         Current Medications[2]    Review of Systems   Constitutional:  Negative for appetite change, chills, fatigue, fever and unexpected weight change.   Respiratory:  Negative for cough and shortness of breath.    Cardiovascular:  Negative for chest pain, palpitations and leg swelling.   Gastrointestinal:  Negative for abdominal pain, change in bowel habit, constipation, diarrhea, nausea and vomiting.   Genitourinary:  Negative for dysuria and frequency.   Musculoskeletal:  Positive for arthralgias and back pain. Negative for gait problem and myalgias.   Neurological:  Negative for dizziness, syncope, light-headedness and headaches.   Psychiatric/Behavioral:  Negative for dysphoric mood and sleep disturbance. The patient is not nervous/anxious.        Objective     /72 (Patient Position: Sitting)   Pulse 67   Resp 14   Ht 5' 1" (1.549 m)   Wt 67.1 kg (148 lb)   SpO2 99%   BMI 27.96 kg/m²     Physical Exam   Constitutional: She is oriented to person, place, and time. No distress.   HENT:   Head: Atraumatic.   Mouth/Throat: Mucous membranes are moist.   Cardiovascular: Normal rate and regular rhythm. Pulmonary:      Effort: Pulmonary effort is normal. No respiratory distress.      Breath sounds: Normal breath sounds. No wheezing, rhonchi or rales.     Musculoskeletal:         General: Normal range of motion.      Cervical back: Neck supple.      Right lower leg: No edema.      Left lower leg: No edema.   Neurological: She is alert and oriented to person, place, and time. Gait normal.   Skin: Skin is warm and dry.   Psychiatric: Her behavior is normal. Mood normal.       Lab Results   Component Value Date    WBC 6.08 06/06/2024    HGB 10.9 (L) 06/06/2024    HCT 34.7 (L) 06/06/2024    MCV 97.5 (H) 06/06/2024     06/06/2024       CMP  Sodium   Date Value Ref Range Status   04/19/2024 143 136 - 145 mmol/L Final     Potassium   Date Value Ref Range Status " "  04/19/2024 4.5 3.5 - 5.1 mmol/L Final     Chloride   Date Value Ref Range Status   04/19/2024 112 (H) 98 - 107 mmol/L Final     CO2   Date Value Ref Range Status   04/19/2024 27 21 - 32 mmol/L Final     Glucose   Date Value Ref Range Status   04/19/2024 101 74 - 106 mg/dL Final     BUN   Date Value Ref Range Status   04/19/2024 19 (H) 7 - 18 mg/dL Final     Creatinine   Date Value Ref Range Status   04/19/2024 0.78 0.55 - 1.02 mg/dL Final     Calcium   Date Value Ref Range Status   11/18/2024 9.4 8.7 - 10.3 mg/dL Final     Total Protein   Date Value Ref Range Status   04/19/2024 7.1 6.4 - 8.2 g/dL Final     Albumin   Date Value Ref Range Status   04/19/2024 3.6 3.5 - 5.0 g/dL Final     Bilirubin, Total   Date Value Ref Range Status   04/19/2024 0.5 >0.0 - 1.2 mg/dL Final     Alk Phos   Date Value Ref Range Status   04/19/2024 67 55 - 142 U/L Final     AST   Date Value Ref Range Status   04/19/2024 20 15 - 37 U/L Final     ALT   Date Value Ref Range Status   04/19/2024 26 13 - 56 U/L Final     Anion Gap   Date Value Ref Range Status   04/19/2024 9 7 - 16 mmol/L Final     eGFR   Date Value Ref Range Status   05/18/2022 75 >=60 mL/min/1.73m² Final     Lab Results   Component Value Date    TSH 2.24 11/18/2024     Lab Results   Component Value Date    CHOL 252 (H) 10/14/2024    CHOL 221 (H) 04/19/2024    CHOL 250 (H) 10/12/2023     Lab Results   Component Value Date    HDL 85 (H) 10/14/2024    HDL 85 (H) 04/19/2024    HDL 76 (H) 10/12/2023     Lab Results   Component Value Date    LDLCALC 149 10/14/2024    LDLCALC 127 04/19/2024    LDLCALC 157 10/12/2023     Lab Results   Component Value Date    TRIG 89 10/14/2024    TRIG 45 04/19/2024    TRIG 83 10/12/2023     Lab Results   Component Value Date    CHOLHDL 3.0 10/14/2024    CHOLHDL 2.6 04/19/2024    CHOLHDL 3.3 10/12/2023     Lab Results   Component Value Date    HGBA1C 5.7 (H) 11/18/2024       No results found for: "[id number]", "FLUAMOLEC", "FLUBMOLEC", " ""MOLSTREPAPOC"  Any diagnostic testing results obtained in office or prior to appointment were reviewed were reviewed with patient.    Health Maintenance Due   Topic Date Due    RSV Vaccine (Age 60+ and Pregnant patients) (1 - Risk 60-74 years 1-dose series) Never done    COVID-19 Vaccine (5 - 2024-25 season) 09/01/2024    DEXA Scan  10/19/2024         Health Maintenance Topics with due status: Not Due       Topic Last Completion Date    TETANUS VACCINE 08/30/2020    Colorectal Cancer Screening 11/22/2023    Lipid Panel 10/14/2024    Hemoglobin A1c (Prediabetes) 11/18/2024    Mammogram 04/08/2025     The 10-year ASCVD risk score (Cheryl HERRING, et al., 2019) is: 16.7%    Values used to calculate the score:      Age: 74 years      Sex: Female      Is Non- : No      Diabetic: No      Tobacco smoker: No      Systolic Blood Pressure: 133 mmHg      Is BP treated: Yes      HDL Cholesterol: 85 mg/dL      Total Cholesterol: 252 mg/dL          Assessment and Plan   Prediabetes  -     Hemoglobin A1C; Future; Expected date: 06/18/2025  -     metFORMIN (GLUCOPHAGE) 500 MG tablet; Take 1 tablet (500 mg total) by mouth 3 (three) times daily. with food  Dispense: 270 tablet; Refill: 1    Benign essential HTN  -     Comprehensive Metabolic Panel; Future; Expected date: 06/18/2025  -     losartan (COZAAR) 25 MG tablet; Take 1 tablet (25 mg total) by mouth once daily.  Dispense: 90 tablet; Refill: 1    Hypercholesteremia  -     Lipid Panel; Future; Expected date: 06/18/2025  -     ezetimibe (ZETIA) 10 mg tablet; Take 1 tablet (10 mg total) by mouth once daily. for cholesterol  Dispense: 90 tablet; Refill: 1    Myalgia due to statin    Overweight with body mass index (BMI) of 27 to 27.9 in adult    Post-menopausal  -     DEXA Bone Density Axial Skeleton 1 or more Site W TBS XPD; Future; Expected date: 06/18/2025          Patient Instructions  Patient Instructions   Lab obtained in clinic today, we will notify you " of results and any necessary changes to plan of care   Refills on routine medications   Follow up on  and as needed; routine medication will be due     Referral for DXA bone density scan                                      [1]   Social History  Tobacco Use    Smoking status: Never     Passive exposure: Never    Smokeless tobacco: Never   Substance Use Topics    Alcohol use: Yes     Alcohol/week: 4.0 standard drinks of alcohol     Types: 4 Glasses of wine per week     Comment: per week    Drug use: Never   [2]   Current Outpatient Medications:     aspirin 81 MG Chew, Take 81 mg by mouth once daily., Disp: , Rfl:     fluticasone propionate (FLONASE) 50 mcg/actuation nasal spray, 1 spray by Each Nostril route daily as needed for Rhinitis., Disp: , Rfl:     lansoprazole (PREVACID 24HR) 15 MG capsule, Take 1 capsule (15 mg total) by mouth once daily., Disp: 30 capsule, Rfl: 5    multivitamin (THERAGRAN) per tablet, Take 1 tablet by mouth once daily., Disp: , Rfl:     mupirocin (BACTROBAN) 2 % ointment, Apply topically 3 (three) times daily., Disp: 15 g, Rfl: 1    rOPINIRole (REQUIP) 0.5 MG tablet, Take 3 tablets (1.5 mg total) by mouth nightly., Disp: 90 tablet, Rfl: 5    ezetimibe (ZETIA) 10 mg tablet, Take 1 tablet (10 mg total) by mouth once daily. for cholesterol, Disp: 90 tablet, Rfl: 1    losartan (COZAAR) 25 MG tablet, Take 1 tablet (25 mg total) by mouth once daily., Disp: 90 tablet, Rfl: 1    metFORMIN (GLUCOPHAGE) 500 MG tablet, Take 1 tablet (500 mg total) by mouth 3 (three) times daily. with food, Disp: 270 tablet, Rfl: 1

## 2025-06-23 ENCOUNTER — RESULTS FOLLOW-UP (OUTPATIENT)
Dept: FAMILY MEDICINE | Facility: CLINIC | Age: 75
End: 2025-06-23

## 2025-06-25 ENCOUNTER — PATIENT MESSAGE (OUTPATIENT)
Dept: FAMILY MEDICINE | Facility: CLINIC | Age: 75
End: 2025-06-25
Payer: MEDICARE

## 2025-06-25 DIAGNOSIS — E87.5 HYPERKALEMIA: Primary | ICD-10-CM

## 2025-07-02 ENCOUNTER — RESULTS FOLLOW-UP (OUTPATIENT)
Dept: FAMILY MEDICINE | Facility: CLINIC | Age: 75
End: 2025-07-02

## 2025-07-02 ENCOUNTER — HOSPITAL ENCOUNTER (OUTPATIENT)
Dept: RADIOLOGY | Facility: HOSPITAL | Age: 75
Discharge: HOME | End: 2025-07-02
Attending: NURSE PRACTITIONER
Payer: MEDICARE

## 2025-07-02 DIAGNOSIS — Z78.0 POST-MENOPAUSAL: ICD-10-CM

## 2025-07-02 PROCEDURE — 77080 DXA BONE DENSITY AXIAL: CPT | Mod: 26,,, | Performed by: NUCLEAR MEDICINE

## 2025-07-02 PROCEDURE — 77080 DXA BONE DENSITY AXIAL: CPT | Mod: TC

## 2025-07-07 ENCOUNTER — RESULTS FOLLOW-UP (OUTPATIENT)
Dept: FAMILY MEDICINE | Facility: CLINIC | Age: 75
End: 2025-07-07

## 2025-07-31 ENCOUNTER — OFFICE VISIT (OUTPATIENT)
Dept: FAMILY MEDICINE | Facility: CLINIC | Age: 75
End: 2025-07-31
Payer: MEDICARE

## 2025-07-31 VITALS
SYSTOLIC BLOOD PRESSURE: 142 MMHG | HEIGHT: 61 IN | TEMPERATURE: 98 F | BODY MASS INDEX: 27.81 KG/M2 | OXYGEN SATURATION: 98 % | WEIGHT: 147.31 LBS | HEART RATE: 63 BPM | DIASTOLIC BLOOD PRESSURE: 69 MMHG | RESPIRATION RATE: 18 BRPM

## 2025-07-31 DIAGNOSIS — T46.6X5A MYALGIA DUE TO STATIN: ICD-10-CM

## 2025-07-31 DIAGNOSIS — R73.03 PREDIABETES: ICD-10-CM

## 2025-07-31 DIAGNOSIS — I10 BENIGN ESSENTIAL HTN: ICD-10-CM

## 2025-07-31 DIAGNOSIS — Z00.00 ENCOUNTER FOR SUBSEQUENT ANNUAL WELLNESS VISIT (AWV) IN MEDICARE PATIENT: Primary | ICD-10-CM

## 2025-07-31 DIAGNOSIS — E78.00 HYPERCHOLESTEREMIA: ICD-10-CM

## 2025-07-31 DIAGNOSIS — M79.10 MYALGIA DUE TO STATIN: ICD-10-CM

## 2025-07-31 DIAGNOSIS — E66.3 OVERWEIGHT WITH BODY MASS INDEX (BMI) OF 27 TO 27.9 IN ADULT: ICD-10-CM

## 2025-07-31 PROCEDURE — G0439 PPPS, SUBSEQ VISIT: HCPCS | Mod: ,,, | Performed by: NURSE PRACTITIONER

## 2025-07-31 NOTE — PATIENT INSTRUCTIONS
Counseling and Referral of Other Preventative  (Italic type indicates deductible and co-insurance are waived)    Patient Name: Lynda Parr  Today's Date: 7/31/2025    Health Maintenance       Date Due Completion Date    RSV Vaccine (Age 60+ and Pregnant patients) (1 - Risk 60-74 years 1-dose series) Never done ---    COVID-19 Vaccine (5 - 2024-25 season) 09/01/2024 4/5/2022    Influenza Vaccine (1) 09/01/2025 10/8/2024    Mammogram 04/08/2026 4/8/2025    Hemoglobin A1c (Prediabetes) 06/18/2026 6/18/2025    Colorectal Cancer Screening 11/22/2028 11/22/2023    Override on 10/5/2020: Done (polyps, Dr. Koch, repeat 3 yrs)    Lipid Panel 06/18/2030 6/18/2025    DEXA Scan 07/02/2030 7/2/2025    TETANUS VACCINE 08/30/2030 8/30/2020        No orders of the defined types were placed in this encounter.    The following information is provided to all patients.  This information is to help you find resources for any of the problems found today that may be affecting your health:                  Living healthy guide: ms.gov    Understanding Diabetes: www.diabetes.org      Eating healthy: www.cdc.gov/healthyweight      CDC home safety checklist: www.cdc.gov/steadi/patient.html      Agency on Aging: ms.gov    Alcoholics anonymous (AA): www.aa.org      Physical Activity: www.amandeep.nih.gov/lx6yqma      Tobacco use: ms.gov

## 2025-07-31 NOTE — PROGRESS NOTES
"  Lynda Parr presented for a follow-up Medicare AWV today. The following components were reviewed and updated:    Medical history  Family History  Social history  Allergies and Current Medications  Health Risk Assessment  Health Maintenance  Care Team    **See Completed Assessments for Annual Wellness visit with in the encounter summary    The following assessments were completed:  Depression Screening  Cognitive function Screening  Timed Get Up Test  Whisper Test      Opioid documentation:      Patient does not have a current opioid prescription.          Vitals:    07/31/25 0952 07/31/25 1033   BP: (!) 145/68 (!) 142/69   BP Location: Left arm Right arm   Patient Position: Sitting Sitting   Pulse: 63    Resp: 18    Temp: 97.5 °F (36.4 °C)    SpO2: 98%    Weight: 66.8 kg (147 lb 4.8 oz)    Height: 5' 1" (1.549 m)      Body mass index is 27.83 kg/m².       Physical Exam  Constitutional: She is oriented to person, place, and time. No distress.   HENT:   Head: Atraumatic.   Mouth/Throat: Mucous membranes are moist.   Cardiovascular: Normal rate and regular rhythm. Pulmonary:      Effort: Pulmonary effort is normal. No respiratory distress.      Breath sounds: Normal breath sounds. No wheezing, rhonchi or rales.      Musculoskeletal:         General: Normal range of motion.      Cervical back: Neck supple.      Right lower leg: No edema.      Left lower leg: No edema.   Neurological: She is alert and oriented to person, place, and time. Gait normal.   Skin: Skin is warm and dry.   Psychiatric: Her behavior is normal. Mood normal.     Diagnoses and health risks identified today and associated recommendations/orders:    1. Encounter for subsequent annual wellness visit (AWV) in Medicare patient  Appointment for AWV in one year       2. Benign essential HTN  Continue current medication, DASH diet, home blood pressure monitoring, physical activity 30 minutes on 5 days per week       3. Hypercholesteremia  Continue " current medication, low saturated fat low cholesterol heart healthy diet, physical exercise 30 minutes on five days per week      4. Prediabetes  Continue current medication, diabetic diet,  a1c every 3-6 months, physical activity 30 minutes 5 days per week       5. Myalgia due to statin  Continue current medication; recommend low saturated fat low cholesterol heart healthy diet     6. Overweight with body mass index (BMI) of 27 to 27.9 in adult  Weight loss by reducing calories by 500 kcal  per day, physical exercise 30 minutes on 5 days per week      Health Maintenance   Topic Date Due    RSV Vaccine (Age 60+ and Pregnant patients) (1 - Risk 60-74 years 1-dose series) Never done    COVID-19 Vaccine (5 - 2024-25 season) 09/01/2024    Influenza Vaccine (1) 09/01/2025    Mammogram  04/08/2026    Hemoglobin A1c (Prediabetes)  06/18/2026    Colorectal Cancer Screening  11/22/2028    Lipid Panel  06/18/2030    DEXA Scan  07/02/2030    TETANUS VACCINE  08/30/2030    Hepatitis C Screening  Completed    Shingles Vaccine  Completed    Pneumococcal Vaccines (Age 50+)  Completed     Health Maintenance Topics with due status: Not Due       Topic Last Completion Date    TETANUS VACCINE 08/30/2020    Colorectal Cancer Screening 11/22/2023    Influenza Vaccine 10/08/2024    Mammogram 04/08/2025    Hemoglobin A1c (Prediabetes) 06/18/2025    Lipid Panel 06/18/2025    DEXA Scan 07/02/2025     Health Maintenance Due   Topic Date Due    RSV Vaccine (Age 60+ and Pregnant patients) (1 - Risk 60-74 years 1-dose series) Never done    COVID-19 Vaccine (5 - 2024-25 season) 09/01/2024   Importance of vaccinations discussed. Patient verbalizes understanding,will consider vaccines from pharmacy.   BP elevated. Pt states she has not taken BP meds this AM. F/U appointment scheduled for 6 months.    Provided Lynda with a 5-10 year written screening schedule and personal prevention plan. Recommendations were developed using the USPSTF age  appropriate recommendations. Education, counseling, and referrals were provided as needed.  After Visit Summary printed and given to patient which includes a list of additional screenings\tests needed.    Follow up for 1 year for annual wellness visit.      Vicky Peres, BERNARD      I offered to discuss advanced care planning, including how to pick a person who would make decisions for you if you were unable to make them for yourself, called a health care power of , and what kind of decisions you might make such as use of life sustaining treatments such as ventilators and tube feeding when faced with a life limiting illness recorded on a living will that they will need to know. (How you want to be cared for as you near the end of your natural life)     X Patient is interested in learning more about how to make advanced directives.  I provided them paperwork and offered to discuss this with them.

## 2025-08-01 ENCOUNTER — PATIENT MESSAGE (OUTPATIENT)
Facility: HOSPITAL | Age: 75
End: 2025-08-01
Payer: MEDICARE

## 2025-08-05 ENCOUNTER — PATIENT OUTREACH (OUTPATIENT)
Facility: HOSPITAL | Age: 75
End: 2025-08-05
Payer: MEDICARE

## 2025-08-05 VITALS — SYSTOLIC BLOOD PRESSURE: 124 MMHG | DIASTOLIC BLOOD PRESSURE: 59 MMHG

## 2025-08-05 NOTE — PROGRESS NOTES
Population Health Chart Review & Patient Outreach Details      Further Action Needed If Patient Returns Outreach:        Health Maintenance Due   Topic Date Due    RSV Vaccine (Age 60+ and Pregnant patients) (1 - Risk 60-74 years 1-dose series) Never done    COVID-19 Vaccine ( season) 2024          Updates Requested / Reviewed:     []  Care Everywhere    []     []  External Sources ([facility], [facility], DIS, etc.)    [] [facility]   [] [facility]   [] Other:    []  Care Team Updated   []  Removed  or Duplicate Orders   []  Immunization Reconciliation Completed / Queried    [] [place]   [] [place]   [] [place]   [] [place]      Health Maintenance Topics Addressed and Outreach Outcomes / Actions Taken:             Breast Cancer Screening []  Mammogram Order Placed    []  Mammogram Screening Scheduled    []  External Records Requested & Care Team Updated if Applicable    []  External Records Uploaded & Care Team Updated if Applicable    []  Pt Declined Scheduling Mammogram    []  Pt Will Schedule with External Provider / Order Routed & Care Team Updated if Applicable              Cervical Cancer Screening []  Pap Smear Scheduled in Primary Care or OBGYN    []  External Records Requested & Care Team Updated if Applicable       []  External Records Uploaded, Care Team Updated, & History Updated if Applicable    []  Patient Declined Scheduling Pap Smear    []  Patient Will Schedule with External Provider & Care Team Updated if Applicable                  Colorectal Cancer Screening []  Colonoscopy Case Request / Referral / Home Test Order Placed    []  External Records Requested & Care Team Updated if Applicable    []  External Records Uploaded, Care Team Updated, & History Updated if Applicable    []  Patient Declined Completing Colon Cancer Screening    []  Patient Will Schedule with External Provider & Care Team Updated if Applicable    []  Fit Kit Mailed (add the SmartPhrase under  additional notes)    []  Reminded Patient to Complete Home Test                Diabetic Eye Exam []  Eye Exam Screening Order Placed    []  Eye Camera Scheduled or Optometry/Ophthalmology Referral Placed    []  External Records Requested & Care Team Updated if Applicable    []  External Records Uploaded, Care Team Updated, & History Updated if Applicable    []  Patient Declined Scheduling Eye Exam    []  Patient Will Schedule with External Provider & Care Team Updated if Applicable             Blood Pressure Control []  Primary Care Follow Up Visit Scheduled     [x]  Remote Blood Pressure Reading Captured/**08/05/2025 Pt responded to my chart remote blood pressure request. /59 left arm and 121/57 right arm. Updated blood pressure reading with this result. I sent response with reading to PCP nursing staff.     []  Patient Declined Remote Reading or Scheduling Appt - Escalated to PCP    []  Patient Will Call Back or Send Portal Message with Reading                 HbA1c & Other Labs []  Overdue Lab(s) Ordered    []  Overdue Lab(s) Scheduled    []  External Records Uploaded & Care Team Updated if Applicable    []  Primary Care Follow Up Visit Scheduled     []  Reminded Patient to Complete A1c Home Test    []  Patient Declined Scheduling Labs or Will Call Back to Schedule    []  Patient Will Schedule with External Provider / Order Routed, & Care Team Updated if Applicable           Primary Care Appointment []  Primary Care Appt Scheduled    []  Patient Declined Scheduling or Will Call Back to Schedule    []  Pt Established with External Provider, Updated Care Team, & Informed Pt to Notify Payor if Applicable           Medication Adherence /    Statin Use []  Primary Care Appointment Scheduled    []  Patient Reminded to  Prescription    []  Patient Declined, Provider Notified if Needed    []  Sent Provider Message to Review to Evaluate Pt for Statin, Add Exclusion Dx Codes, Document   Exclusion in Problem  List, Change Statin Intensity Level to Moderate or High Intensity if Applicable                Osteoporosis Screening []  Dexa Order Placed    []  Dexa Appointment Scheduled    []  External Records Requested & Care Team Updated    []  External Records Uploaded, Care Team Updated, & History Updated if Applicable    []  Patient Declined Scheduling Dexa or Will Call Back to Schedule    []  Patient Will Schedule with External Provider / Order Routed & Care Team Updated if Applicable       Additional Notes:

## 2025-08-06 ENCOUNTER — PATIENT MESSAGE (OUTPATIENT)
Dept: FAMILY MEDICINE | Facility: CLINIC | Age: 75
End: 2025-08-06
Payer: MEDICARE

## 2025-08-06 VITALS — SYSTOLIC BLOOD PRESSURE: 126 MMHG | DIASTOLIC BLOOD PRESSURE: 69 MMHG

## 2025-08-19 ENCOUNTER — OFFICE VISIT (OUTPATIENT)
Dept: FAMILY MEDICINE | Facility: CLINIC | Age: 75
End: 2025-08-19
Payer: MEDICARE

## 2025-08-19 VITALS
TEMPERATURE: 98 F | DIASTOLIC BLOOD PRESSURE: 54 MMHG | SYSTOLIC BLOOD PRESSURE: 93 MMHG | HEART RATE: 78 BPM | WEIGHT: 146 LBS | OXYGEN SATURATION: 98 % | HEIGHT: 61 IN | BODY MASS INDEX: 27.56 KG/M2

## 2025-08-19 DIAGNOSIS — U07.1 COVID-19: Primary | ICD-10-CM

## 2025-08-19 DIAGNOSIS — Z20.828 VIRAL DISEASE EXPOSURE: ICD-10-CM

## 2025-08-19 DIAGNOSIS — R73.03 PREDIABETES: Chronic | ICD-10-CM

## 2025-08-19 DIAGNOSIS — I10 BENIGN ESSENTIAL HTN: Chronic | ICD-10-CM

## 2025-08-19 DIAGNOSIS — R11.0 NAUSEA: ICD-10-CM

## 2025-08-19 DIAGNOSIS — U07.1 COVID-19 VIRUS DETECTED: ICD-10-CM

## 2025-08-19 LAB
CTP QC/QA: YES
CTP QC/QA: YES
POC MOLECULAR INFLUENZA A AGN: NEGATIVE
POC MOLECULAR INFLUENZA B AGN: NEGATIVE
SARS-COV-2 RDRP RESP QL NAA+PROBE: POSITIVE

## 2025-08-19 PROCEDURE — 99214 OFFICE O/P EST MOD 30 MIN: CPT | Mod: ,,, | Performed by: NURSE PRACTITIONER

## 2025-08-19 PROCEDURE — 87502 INFLUENZA DNA AMP PROBE: CPT | Mod: RHCUB | Performed by: NURSE PRACTITIONER

## 2025-08-19 PROCEDURE — 87635 SARS-COV-2 COVID-19 AMP PRB: CPT | Mod: RHCUB | Performed by: NURSE PRACTITIONER

## 2025-08-19 RX ORDER — AZITHROMYCIN 250 MG/1
TABLET, FILM COATED ORAL
Qty: 6 TABLET | Refills: 0 | Status: SHIPPED | OUTPATIENT
Start: 2025-08-19 | End: 2025-08-24

## 2025-08-19 RX ORDER — ONDANSETRON 8 MG/1
8 TABLET, ORALLY DISINTEGRATING ORAL EVERY 12 HOURS PRN
Qty: 20 TABLET | Refills: 0 | Status: SHIPPED | OUTPATIENT
Start: 2025-08-19

## 2025-08-21 ENCOUNTER — PATIENT MESSAGE (OUTPATIENT)
Dept: FAMILY MEDICINE | Facility: CLINIC | Age: 75
End: 2025-08-21
Payer: MEDICARE

## 2025-08-21 RX ORDER — METHYLPREDNISOLONE 4 MG/1
TABLET ORAL
Qty: 21 EACH | Refills: 0 | Status: SHIPPED | OUTPATIENT
Start: 2025-08-21 | End: 2025-09-11

## (undated) DEVICE — TRAY EPIDURAL SINGLE SHOT PMA

## (undated) DEVICE — APPLICATOR CHLORAPREP ORN 26ML

## (undated) DEVICE — CATH IV JELCO 22GX1 IN

## (undated) DEVICE — SYR EPILOR LUER-LOK LOR 7ML

## (undated) DEVICE — APPLICATOR CHLORAPREP HI-LITE TINTED ORANGE 26ML

## (undated) DEVICE — NDL TUOHY EPIDURAL 20GX3.5IN

## (undated) DEVICE — CATH IV 22G X 1 AUTOGUARD

## (undated) DEVICE — GLOVE SURGICAL PROTEXIS PI SIZE 6.5

## (undated) DEVICE — KIT IV START RUSH

## (undated) DEVICE — SLIPPER FALL PREV YEL BARIAT

## (undated) DEVICE — GLOVE SENSICARE PI SURG 6.5

## (undated) DEVICE — KIT IV START 849

## (undated) DEVICE — GLOVE PROTEXIS PI SYN SURG 6.5

## (undated) DEVICE — SET IV SOL CONTIN-FLOW 10 DROP/ML (PRIMARY)

## (undated) DEVICE — TRAY EPIDURAL SINGLE SHOT(PMA)

## (undated) DEVICE — TRAY NERVE BLOCK UNIV 10/CA